# Patient Record
Sex: FEMALE | Race: WHITE | NOT HISPANIC OR LATINO | URBAN - METROPOLITAN AREA
[De-identification: names, ages, dates, MRNs, and addresses within clinical notes are randomized per-mention and may not be internally consistent; named-entity substitution may affect disease eponyms.]

---

## 2017-02-15 ENCOUNTER — TELEPHONE (OUTPATIENT)
Dept: PULMONOLOGY | Facility: HOSPICE | Age: 52
End: 2017-02-15

## 2017-02-15 DIAGNOSIS — G47.33 OSA (OBSTRUCTIVE SLEEP APNEA): ICD-10-CM

## 2017-03-03 ENCOUNTER — OFFICE VISIT (OUTPATIENT)
Dept: CARDIOLOGY | Facility: MEDICAL CENTER | Age: 52
End: 2017-03-03
Payer: COMMERCIAL

## 2017-03-03 VITALS
SYSTOLIC BLOOD PRESSURE: 136 MMHG | HEART RATE: 82 BPM | BODY MASS INDEX: 41.95 KG/M2 | DIASTOLIC BLOOD PRESSURE: 90 MMHG | HEIGHT: 70 IN | WEIGHT: 293 LBS | OXYGEN SATURATION: 96 %

## 2017-03-03 DIAGNOSIS — F33.41 RECURRENT MAJOR DEPRESSIVE DISORDER, IN PARTIAL REMISSION (HCC): ICD-10-CM

## 2017-03-03 DIAGNOSIS — R94.31 NONSPECIFIC ABNORMAL ELECTROCARDIOGRAM (ECG) (EKG): ICD-10-CM

## 2017-03-03 PROCEDURE — 99204 OFFICE O/P NEW MOD 45 MIN: CPT | Performed by: INTERNAL MEDICINE

## 2017-03-03 RX ORDER — ESCITALOPRAM OXALATE 20 MG/1
20 TABLET ORAL DAILY
Qty: 30 TAB | Refills: 11 | Status: SHIPPED | OUTPATIENT
Start: 2017-03-03 | End: 2018-03-29 | Stop reason: SDUPTHER

## 2017-03-03 ASSESSMENT — ENCOUNTER SYMPTOMS
STRIDOR: 0
DIZZINESS: 0
WHEEZING: 0
SPUTUM PRODUCTION: 0
GASTROINTESTINAL NEGATIVE: 1
MUSCULOSKELETAL NEGATIVE: 1
SHORTNESS OF BREATH: 0
BRUISES/BLEEDS EASILY: 0
WEAKNESS: 0
CONSTITUTIONAL NEGATIVE: 1
CHILLS: 0
LOSS OF CONSCIOUSNESS: 0
PALPITATIONS: 0
HEMOPTYSIS: 0
CLAUDICATION: 0
PND: 0
RESPIRATORY NEGATIVE: 1
FEVER: 0
COUGH: 0
ORTHOPNEA: 0
CARDIOVASCULAR NEGATIVE: 1
SORE THROAT: 0
EYES NEGATIVE: 1
NEUROLOGICAL NEGATIVE: 1

## 2017-03-03 NOTE — Clinical Note
[facility]   [address]  [place] [address]  Phone: [phone]  Fax: [phone]              Latia Almanza  1965    Encounter Date: 3/3/2017    Brandon Palencia M.D.          PROGRESS NOTE:  Subjective:   Latia Almanza is a 51 y.o. female who presents today as a new consultation for an abnormal ECG. Recently she underwent a back surgery and had an ECG afterwards that showed an incomplete right bundle branch block which was read as possible right ventricular hypertrophy. She was concerned about this and schedule appointment. She's had no changes to her functional activity last couple of years. Her EKG from one year ago shows the exact same pattern. She does have sleep apnea which is treated. She also admits that she is overweight with a BMI of 43. An echocardiogram one year ago was normal.    Past Medical History   Diagnosis Date   • Anxiety    • Sciatica    • Allergic rhinitis    • Obesity    • Restless leg syndrome    • Sarcoidosis (CMS-HCC)    • Snoring    • Pneumonia 5/2016   • Sleep apnea      CPAP   • Dental disorder      upper implants and bridge   • Bronchitis 5/2016   • Back pain      back/ right hip     Past Surgical History   Procedure Laterality Date   • Abdominal hysterectomy total     • Athroplasty     • Hip arthroplasty total     • Hip replacement, total     • Hysterectomy laparoscopy     • Laparotomy     • Sinuscope     • Other       dental implants   • Other orthopedic surgery       R hip replacement March 2008   • Lumbar fusion anterior  10/27/2016     Procedure: LUMBAR FUSION ANTERIOR L5-S1 ALIF;  Surgeon: Ronal Washington III, M.D.;  Location: SURGERY [facility];  Service:      Family History   Problem Relation Age of Onset   • Cancer Mother    • Lung Disease Mother    • Diabetes Mother    • Diabetes Father    • Heart Disease Father      History   Smoking status   • Former Smoker -- 0.25 packs/day for 5 years   •  "Types: Cigarettes   • Quit date: 01/20/2001   Smokeless tobacco   • Never Used     No Known Allergies  Outpatient Encounter Prescriptions as of 3/3/2017   Medication Sig Dispense Refill   • escitalopram (LEXAPRO) 20 MG tablet Take 1 Tab by mouth every day. 30 Tab 11   • estradiol (VIVELLE DOT) 0.05 MG/24HR PATCH BIWEEKLY APPLY ONE PATCH TO CLEAN, DRY SKIN AS DIRECTED TWO TIMES PER WEEK. REMOVE OLD PATCH BEFORE APPLYING NEW. 8 Patch 0   • methocarbamol (ROBAXIN) 750 MG Tab Take 1 Tab by mouth every 8 hours as needed. 120 Tab 3   • albuterol (VENTOLIN OR PROVENTIL) 108 (90 BASE) MCG/ACT Aero Soln inhalation aerosol Inhale 2 Puffs by mouth every 6 hours as needed for Shortness of Breath. 8.5 g 0   • Oxycodone-Acetaminophen (PERCOCET-10)  MG Tab Take 1-2 Tabs by mouth every four hours as needed for Moderate Pain or Severe Pain. 100 Tab 0   • promethazine (PHENERGAN) 12.5 MG tablet Take 1-2 Tabs by mouth every 6 hours as needed for Nausea/Vomiting (if no relief from ondansetron or if ondansetron is not ordered). 30 Tab 0     No facility-administered encounter medications on file as of 3/3/2017.     Review of Systems   Constitutional: Negative.  Negative for fever, chills and malaise/fatigue.   HENT: Negative.  Negative for sore throat.    Eyes: Negative.    Respiratory: Negative.  Negative for cough, hemoptysis, sputum production, shortness of breath, wheezing and stridor.    Cardiovascular: Negative.  Negative for chest pain, palpitations, orthopnea, claudication, leg swelling and PND.   Gastrointestinal: Negative.    Genitourinary: Negative.    Musculoskeletal: Negative.    Skin: Negative.    Neurological: Negative.  Negative for dizziness, loss of consciousness and weakness.   Endo/Heme/Allergies: Negative.  Does not bruise/bleed easily.   All other systems reviewed and are negative.       Objective:   /90 mmHg  Pulse 82  Ht 1.778 m (5' 10\")  Wt 136.533 kg (301 lb)  BMI 43.19 kg/m2  SpO2 " 96%    Physical Exam   Constitutional: She is oriented to person, place, and time. She appears well-developed and well-nourished. No distress.   HENT:   Head: Normocephalic.   Mouth/Throat: Oropharynx is clear and moist.   Eyes: EOM are normal. Pupils are equal, round, and reactive to light. Right eye exhibits no discharge. Left eye exhibits no discharge. No scleral icterus.   Neck: Normal range of motion. Neck supple. No JVD present. No tracheal deviation present.   Cardiovascular: Normal rate, regular rhythm, S1 normal, S2 normal, normal heart sounds, intact distal pulses and normal pulses.  Exam reveals no gallop, no S3, no S4 and no friction rub.    No murmur heard.   No systolic murmur is present    No diastolic murmur is present   Pulses:       Carotid pulses are 2+ on the right side, and 2+ on the left side.       Radial pulses are 2+ on the right side, and 2+ on the left side.        Dorsalis pedis pulses are 2+ on the right side, and 2+ on the left side.        Posterior tibial pulses are 2+ on the right side, and 2+ on the left side.   Pulmonary/Chest: Effort normal and breath sounds normal. No respiratory distress. She has no wheezes. She has no rales.   Abdominal: Soft. Bowel sounds are normal. She exhibits no distension and no mass. There is no tenderness. There is no rebound and no guarding.   Musculoskeletal: She exhibits no edema.   Neurological: She is alert and oriented to person, place, and time. No cranial nerve deficit.   Skin: Skin is warm and dry. She is not diaphoretic. No pallor.   Psychiatric: She has a normal mood and affect. Her behavior is normal. Judgment and thought content normal.   Nursing note and vitals reviewed.      Assessment:     1. Nonspecific abnormal electrocardiogram (ECG) (EKG)  EKG   2. Recurrent major depressive disorder, in partial remission (CMS-HCC)  escitalopram (LEXAPRO) 20 MG tablet       Medical Decision Making:  Today's Assessment / Status / Plan:     51-year-old  female with obesity and a rSR pattern in V1 consistent with mild delay of the right ventricle likely related to both sleep apnea and her weight. She is asymptomatic. I would not recommend any further cardiovascular testing. He can return to clinic as needed.      Armani Chambers M.D.  [address]  [address]  [place] [address]  VIA In Basket

## 2017-03-03 NOTE — MR AVS SNAPSHOT
"Latia Almanza   3/3/2017 1:45 PM   Office Visit   MRN: [id number]    Department:  [facility]   Dept Phone:  [phone]    Description:  Female : 1965   Provider:  Brandon Palencia M.D.           Reason for Visit     New Patient           Allergies as of 3/3/2017     No Known Allergies      You were diagnosed with     Nonspecific abnormal electrocardiogram (ECG) (EKG)   [794.31.ICD-9-CM]       Recurrent major depressive disorder, in partial remission (CMS-HCC)   [2310790]         Vital Signs     Blood Pressure Pulse Height Weight Body Mass Index Oxygen Saturation    136/90 mmHg 82 1.778 m (5' 10\") 136.533 kg (301 lb) 43.19 kg/m2 96%    Smoking Status                   Former Smoker           Basic Information     Date Of Birth Sex Race Ethnicity Preferred Language    1965 Female White Non- English      Your appointments     Mar 22, 2017  1:00 PM   Pulmonary Function Test with PFT-RM2   [facility] (--)    [address]  [place] [id number] [phone]            Mar 22, 2017  2:00 PM   Established Patient Pul with Lance Latif M.D.   [facility] (--)    [address]  [address]  [place] [id number] [phone]              Problem List              ICD-10-CM Priority Class Noted - Resolved    Sleep apnea G47.30   2015 - Present    H/O hysterectomy with oophorectomy Z90.710, Z90.721   2015 - Present    S/P hip replacement Z96.649   2015 - Present    Wheezing R06.2   2016 - Present    Postmenopausal HRT (hormone replacement therapy) Z79.890   2016 - Present    Degeneration of lumbar intervertebral disc M51.36   10/27/2016 - Present    Recurrent major depressive disorder, in partial remission (CMS-HCC) F33.41   3/3/2017 - Present      Health Maintenance        Date Due Completion Dates    IMM DTaP/Tdap/Td Vaccine (1 - Tdap) 1983 ---    MAMMOGRAM 5/15/2016 5/15/2015, 5/15/2015, " 5/15/2015, 5/15/2015, 5/15/2015, 5/15/2015, 5/15/2015, 5/6/2015    IMM INFLUENZA (1) 9/1/2016 ---    COLONOSCOPY 5/4/2025 5/4/2015            Results       Current Immunizations     No immunizations on file.      Below and/or attached are the medications your provider expects you to take. Review all of your home medications and newly ordered medications with your provider and/or pharmacist. Follow medication instructions as directed by your provider and/or pharmacist. Please keep your medication list with you and share with your provider. Update the information when medications are discontinued, doses are changed, or new medications (including over-the-counter products) are added; and carry medication information at all times in the event of emergency situations     Allergies:  No Known Allergies          Medications  Valid as of: March 03, 2017 -  4:09 PM    Generic Name Brand Name Tablet Size Instructions for use    Albuterol Sulfate (Aero Soln) albuterol 108 (90 BASE) MCG/ACT Inhale 2 Puffs by mouth every 6 hours as needed for Shortness of Breath.        Escitalopram Oxalate (Tab) LEXAPRO 20 MG Take 1 Tab by mouth every day.        Estradiol (PATCH BIWEEKLY) VIVELLE DOT 0.05 MG/24HR APPLY ONE PATCH TO CLEAN, DRY SKIN AS DIRECTED TWO TIMES PER WEEK. REMOVE OLD PATCH BEFORE APPLYING NEW.        Methocarbamol (Tab) ROBAXIN 750 MG Take 1 Tab by mouth every 8 hours as needed.        Oxycodone-Acetaminophen (Tab) PERCOCET-10  MG Take 1-2 Tabs by mouth every four hours as needed for Moderate Pain or Severe Pain.        Promethazine HCl (Tab) PHENERGAN 12.5 MG Take 1-2 Tabs by mouth every 6 hours as needed for Nausea/Vomiting (if no relief from ondansetron or if ondansetron is not ordered).        .                 Medicines prescribed today were sent to:     [id number] - [place] - [address]    [address] [place] [address]    Phone: [phone] Fax: [phone]    Open   Hours?: No      Medication refill instructions:       If your prescription bottle indicates you have medication refills left, it is not necessary to call your provider’s office. Please contact your pharmacy and they will refill your medication.    If your prescription bottle indicates you do not have any refills left, you may request refills at any time through one of the following ways: The online "[employer] system (except Urgent Care), by calling your provider’s office, or by asking your pharmacy to contact your provider’s office with a refill request. Medication refills are processed only during regular business hours and may not be available until the next business day. Your provider may request additional information or to have a follow-up visit with you prior to refilling your medication.   *Please Note: Medication refills are assigned a new Rx number when refilled electronically. Your pharmacy may indicate that no refills were authorized even though a new prescription for the same medication is available at the pharmacy. Please request the medicine by name with the pharmacy before contacting your provider for a refill.           "[employer] Access Code: Activation code not generated  Current [employer] Status: Active

## 2017-03-03 NOTE — PROGRESS NOTES
Subjective:   Latia Almanza is a 51 y.o. female who presents today as a new consultation for an abnormal ECG. Recently she underwent a back surgery and had an ECG afterwards that showed an incomplete right bundle branch block which was read as possible right ventricular hypertrophy. She was concerned about this and schedule appointment. She's had no changes to her functional activity last couple of years. Her EKG from one year ago shows the exact same pattern. She does have sleep apnea which is treated. She also admits that she is overweight with a BMI of 43. An echocardiogram one year ago was normal.    Past Medical History   Diagnosis Date   • Anxiety    • Sciatica    • Allergic rhinitis    • Obesity    • Restless leg syndrome    • Sarcoidosis (CMS-HCC)    • Snoring    • Pneumonia 5/2016   • Sleep apnea      CPAP   • Dental disorder      upper implants and bridge   • Bronchitis 5/2016   • Back pain      back/ right hip     Past Surgical History   Procedure Laterality Date   • Abdominal hysterectomy total     • Athroplasty     • Hip arthroplasty total     • Hip replacement, total     • Hysterectomy laparoscopy     • Laparotomy     • Sinuscope     • Other       dental implants   • Other orthopedic surgery       R hip replacement March 2008   • Lumbar fusion anterior  10/27/2016     Procedure: LUMBAR FUSION ANTERIOR L5-S1 ALIF;  Surgeon: Ronal Washington III, M.D.;  Location: SURGERY [facility];  Service:      Family History   Problem Relation Age of Onset   • Cancer Mother    • Lung Disease Mother    • Diabetes Mother    • Diabetes Father    • Heart Disease Father      History   Smoking status   • Former Smoker -- 0.25 packs/day for 5 years   • Types: Cigarettes   • Quit date: 01/20/2001   Smokeless tobacco   • Never Used     No Known Allergies  Outpatient Encounter Prescriptions as of 3/3/2017   Medication Sig Dispense Refill   • escitalopram (LEXAPRO) 20 MG tablet Take 1 Tab by mouth every day. 30 Tab 11   •  "estradiol (VIVELLE DOT) 0.05 MG/24HR PATCH BIWEEKLY APPLY ONE PATCH TO CLEAN, DRY SKIN AS DIRECTED TWO TIMES PER WEEK. REMOVE OLD PATCH BEFORE APPLYING NEW. 8 Patch 0   • methocarbamol (ROBAXIN) 750 MG Tab Take 1 Tab by mouth every 8 hours as needed. 120 Tab 3   • albuterol (VENTOLIN OR PROVENTIL) 108 (90 BASE) MCG/ACT Aero Soln inhalation aerosol Inhale 2 Puffs by mouth every 6 hours as needed for Shortness of Breath. 8.5 g 0   • Oxycodone-Acetaminophen (PERCOCET-10)  MG Tab Take 1-2 Tabs by mouth every four hours as needed for Moderate Pain or Severe Pain. 100 Tab 0   • promethazine (PHENERGAN) 12.5 MG tablet Take 1-2 Tabs by mouth every 6 hours as needed for Nausea/Vomiting (if no relief from ondansetron or if ondansetron is not ordered). 30 Tab 0     No facility-administered encounter medications on file as of 3/3/2017.     Review of Systems   Constitutional: Negative.  Negative for fever, chills and malaise/fatigue.   HENT: Negative.  Negative for sore throat.    Eyes: Negative.    Respiratory: Negative.  Negative for cough, hemoptysis, sputum production, shortness of breath, wheezing and stridor.    Cardiovascular: Negative.  Negative for chest pain, palpitations, orthopnea, claudication, leg swelling and PND.   Gastrointestinal: Negative.    Genitourinary: Negative.    Musculoskeletal: Negative.    Skin: Negative.    Neurological: Negative.  Negative for dizziness, loss of consciousness and weakness.   Endo/Heme/Allergies: Negative.  Does not bruise/bleed easily.   All other systems reviewed and are negative.       Objective:   /90 mmHg  Pulse 82  Ht 1.778 m (5' 10\")  Wt 136.533 kg (301 lb)  BMI 43.19 kg/m2  SpO2 96%    Physical Exam   Constitutional: She is oriented to person, place, and time. She appears well-developed and well-nourished. No distress.   HENT:   Head: Normocephalic.   Mouth/Throat: Oropharynx is clear and moist.   Eyes: EOM are normal. Pupils are equal, round, and reactive " to light. Right eye exhibits no discharge. Left eye exhibits no discharge. No scleral icterus.   Neck: Normal range of motion. Neck supple. No JVD present. No tracheal deviation present.   Cardiovascular: Normal rate, regular rhythm, S1 normal, S2 normal, normal heart sounds, intact distal pulses and normal pulses.  Exam reveals no gallop, no S3, no S4 and no friction rub.    No murmur heard.   No systolic murmur is present    No diastolic murmur is present   Pulses:       Carotid pulses are 2+ on the right side, and 2+ on the left side.       Radial pulses are 2+ on the right side, and 2+ on the left side.        Dorsalis pedis pulses are 2+ on the right side, and 2+ on the left side.        Posterior tibial pulses are 2+ on the right side, and 2+ on the left side.   Pulmonary/Chest: Effort normal and breath sounds normal. No respiratory distress. She has no wheezes. She has no rales.   Abdominal: Soft. Bowel sounds are normal. She exhibits no distension and no mass. There is no tenderness. There is no rebound and no guarding.   Musculoskeletal: She exhibits no edema.   Neurological: She is alert and oriented to person, place, and time. No cranial nerve deficit.   Skin: Skin is warm and dry. She is not diaphoretic. No pallor.   Psychiatric: She has a normal mood and affect. Her behavior is normal. Judgment and thought content normal.   Nursing note and vitals reviewed.      Assessment:     1. Nonspecific abnormal electrocardiogram (ECG) (EKG)  EKG   2. Recurrent major depressive disorder, in partial remission (CMS-HCC)  escitalopram (LEXAPRO) 20 MG tablet       Medical Decision Making:  Today's Assessment / Status / Plan:     51-year-old female with obesity and a rSR pattern in V1 consistent with mild delay of the right ventricle likely related to both sleep apnea and her weight. She is asymptomatic. I would not recommend any further cardiovascular testing. He can return to clinic as needed.

## 2017-03-06 LAB — EKG IMPRESSION: NORMAL

## 2017-03-14 ENCOUNTER — TELEPHONE (OUTPATIENT)
Dept: PULMONOLOGY | Facility: HOSPICE | Age: 52
End: 2017-03-14

## 2017-03-14 DIAGNOSIS — R06.02 SOB (SHORTNESS OF BREATH): ICD-10-CM

## 2017-05-26 ENCOUNTER — TELEPHONE (OUTPATIENT)
Dept: PULMONOLOGY | Facility: HOSPICE | Age: 52
End: 2017-05-26

## 2017-05-26 DIAGNOSIS — G47.33 OBSTRUCTIVE SLEEP APNEA: ICD-10-CM

## 2017-05-26 NOTE — TELEPHONE ENCOUNTER
Pt is wanting to switch DME companies   Please sign updated order for replacement CPAP machine    Pt will be making her 1 yr appt

## 2017-09-22 ENCOUNTER — HOSPITAL ENCOUNTER (OUTPATIENT)
Dept: RADIOLOGY | Facility: MEDICAL CENTER | Age: 52
End: 2017-09-22
Attending: SURGERY
Payer: COMMERCIAL

## 2017-09-22 DIAGNOSIS — R19.00 ABDOMINAL OR PELVIC SWELLING, MASS OR LUMP, UNSPECIFIED SITE: ICD-10-CM

## 2017-09-22 PROCEDURE — 74177 CT ABD & PELVIS W/CONTRAST: CPT

## 2017-09-22 PROCEDURE — 700117 HCHG RX CONTRAST REV CODE 255: Performed by: SURGERY

## 2017-09-22 RX ADMIN — IOHEXOL 100 ML: 350 INJECTION, SOLUTION INTRAVENOUS at 09:10

## 2017-09-22 RX ADMIN — IOHEXOL 50 ML: 240 INJECTION, SOLUTION INTRATHECAL; INTRAVASCULAR; INTRAVENOUS; ORAL at 09:09

## 2017-10-17 ENCOUNTER — TELEPHONE (OUTPATIENT)
Dept: PULMONOLOGY | Facility: HOSPICE | Age: 52
End: 2017-10-17

## 2017-10-18 NOTE — TELEPHONE ENCOUNTER
Patient calling wants order for chest x-ray for cough. Last seen with Dr Dye 5/27/16. She has only been seen once and will need to be seen with physician. There are now apponitments available this week or next. Patient advised to seek urgent care for chest x-ray. Patient states she will check with her other  Physicians to see what else may be available to her. I did consult with Lala GARCIA and she states urgent care for evaluation. Patient understands and will call back for appt if wished to pursue appt with our office.

## 2017-11-17 ENCOUNTER — OFFICE VISIT (OUTPATIENT)
Dept: CARDIOLOGY | Facility: MEDICAL CENTER | Age: 52
End: 2017-11-17
Payer: COMMERCIAL

## 2017-11-17 VITALS
DIASTOLIC BLOOD PRESSURE: 70 MMHG | SYSTOLIC BLOOD PRESSURE: 130 MMHG | HEART RATE: 80 BPM | WEIGHT: 293 LBS | BODY MASS INDEX: 41.95 KG/M2 | HEIGHT: 70 IN

## 2017-11-17 DIAGNOSIS — Z01.810 PRE-OPERATIVE CARDIOVASCULAR EXAMINATION: ICD-10-CM

## 2017-11-17 PROCEDURE — 99214 OFFICE O/P EST MOD 30 MIN: CPT | Performed by: INTERNAL MEDICINE

## 2017-11-17 ASSESSMENT — ENCOUNTER SYMPTOMS
PND: 0
LOSS OF CONSCIOUSNESS: 0
STRIDOR: 0
EYES NEGATIVE: 1
MUSCULOSKELETAL NEGATIVE: 1
CARDIOVASCULAR NEGATIVE: 1
RESPIRATORY NEGATIVE: 1
ORTHOPNEA: 0
NEUROLOGICAL NEGATIVE: 1
PALPITATIONS: 0
SORE THROAT: 0
CHILLS: 0
BRUISES/BLEEDS EASILY: 0
HEMOPTYSIS: 0
GASTROINTESTINAL NEGATIVE: 1
CONSTITUTIONAL NEGATIVE: 1
FEVER: 0
DIZZINESS: 0
SHORTNESS OF BREATH: 0
WHEEZING: 0
COUGH: 0
CLAUDICATION: 0
SPUTUM PRODUCTION: 0
WEAKNESS: 0

## 2017-11-18 NOTE — PROGRESS NOTES
Subjective:   Latia Almanza is a 52 y.o. female who presents today has a preoperative stratification prior to be a potential bariatric surgery. She has a past medical history of palpitations as well as a right bundle branch block. She gets short of breath with exertion but no chest pain. She is also concerned that when she walks she gets  Red in the face and winded but no chest pain. She thinks she has a premature family history but in reviewing this nobody prior to the age of 55 for males had heart disease and a phenol spread ages 65 had heart disease. Her other risk factors are controlled.    Past Medical History:   Diagnosis Date   • Allergic rhinitis    • Anxiety    • Back pain     back/ right hip   • Bronchitis 5/2016   • Dental disorder     upper implants and bridge   • Obesity    • Pneumonia 5/2016   • Restless leg syndrome    • Sarcoidosis (CMS-HCC)    • Sciatica    • Sleep apnea     CPAP   • Snoring      Past Surgical History:   Procedure Laterality Date   • LUMBAR FUSION ANTERIOR  10/27/2016    Procedure: LUMBAR FUSION ANTERIOR L5-S1 ALIF;  Surgeon: Ronal Washington III, M.D.;  Location: SURGERY [facility];  Service:    • ABDOMINAL HYSTERECTOMY TOTAL     • ATHROPLASTY     • HIP ARTHROPLASTY TOTAL     • HIP REPLACEMENT, TOTAL     • HYSTERECTOMY LAPAROSCOPY     • LAPAROTOMY     • OTHER      dental implants   • OTHER ORTHOPEDIC SURGERY      R hip replacement March 2008   • SINUSCOPE       Family History   Problem Relation Age of Onset   • Cancer Mother    • Lung Disease Mother    • Diabetes Mother    • Diabetes Father    • Heart Disease Father      History   Smoking Status   • Former Smoker   • Packs/day: 0.25   • Years: 5.00   • Types: Cigarettes   • Quit date: 1/20/2001   Smokeless Tobacco   • Never Used     No Known Allergies  Outpatient Encounter Prescriptions as of 11/17/2017   Medication Sig Dispense Refill   • escitalopram (LEXAPRO) 20 MG tablet Take 1 Tab by mouth every day. 30 Tab 11   •  "estradiol (VIVELLE DOT) 0.05 MG/24HR PATCH BIWEEKLY APPLY ONE PATCH TO CLEAN, DRY SKIN AS DIRECTED TWO TIMES PER WEEK. REMOVE OLD PATCH BEFORE APPLYING NEW. 8 Patch 0   • albuterol (VENTOLIN OR PROVENTIL) 108 (90 BASE) MCG/ACT Aero Soln inhalation aerosol Inhale 2 Puffs by mouth every 6 hours as needed for Shortness of Breath. 8.5 g 0   • [DISCONTINUED] Oxycodone-Acetaminophen (PERCOCET-10)  MG Tab Take 1-2 Tabs by mouth every four hours as needed for Moderate Pain or Severe Pain. 100 Tab 0   • [DISCONTINUED] promethazine (PHENERGAN) 12.5 MG tablet Take 1-2 Tabs by mouth every 6 hours as needed for Nausea/Vomiting (if no relief from ondansetron or if ondansetron is not ordered). 30 Tab 0   • [DISCONTINUED] methocarbamol (ROBAXIN) 750 MG Tab Take 1 Tab by mouth every 8 hours as needed. 120 Tab 3     No facility-administered encounter medications on file as of 11/17/2017.      Review of Systems   Constitutional: Negative.  Negative for chills, fever and malaise/fatigue.   HENT: Negative.  Negative for sore throat.    Eyes: Negative.    Respiratory: Negative.  Negative for cough, hemoptysis, sputum production, shortness of breath, wheezing and stridor.    Cardiovascular: Negative.  Negative for chest pain, palpitations, orthopnea, claudication, leg swelling and PND.   Gastrointestinal: Negative.    Genitourinary: Negative.    Musculoskeletal: Negative.    Skin: Negative.    Neurological: Negative.  Negative for dizziness, loss of consciousness and weakness.   Endo/Heme/Allergies: Negative.  Does not bruise/bleed easily.   All other systems reviewed and are negative.       Objective:   /70   Pulse 80   Ht 1.778 m (5' 10\")   Wt (!) 147.9 kg (326 lb)   BMI 46.78 kg/m²     Physical Exam   Constitutional: She is oriented to person, place, and time. She appears well-developed and well-nourished. No distress.   HENT:   Head: Normocephalic.   Mouth/Throat: Oropharynx is clear and moist.   Eyes: EOM are normal. " Pupils are equal, round, and reactive to light. Right eye exhibits no discharge. Left eye exhibits no discharge. No scleral icterus.   Neck: Normal range of motion. Neck supple. No JVD present. No tracheal deviation present.   Cardiovascular: Normal rate, regular rhythm, S1 normal, S2 normal, normal heart sounds, intact distal pulses and normal pulses.  Exam reveals no gallop, no S3, no S4 and no friction rub.    No murmur heard.   No systolic murmur is present    No diastolic murmur is present   Pulses:       Carotid pulses are 2+ on the right side, and 2+ on the left side.       Radial pulses are 2+ on the right side, and 2+ on the left side.        Dorsalis pedis pulses are 2+ on the right side, and 2+ on the left side.        Posterior tibial pulses are 2+ on the right side, and 2+ on the left side.   Pulmonary/Chest: Effort normal and breath sounds normal. No respiratory distress. She has no wheezes. She has no rales.   Abdominal: Soft. Bowel sounds are normal. She exhibits no distension and no mass. There is no tenderness. There is no rebound and no guarding.   Musculoskeletal: She exhibits no edema.   Neurological: She is alert and oriented to person, place, and time. No cranial nerve deficit.   Skin: Skin is warm and dry. She is not diaphoretic. No pallor.   Psychiatric: She has a normal mood and affect. Her behavior is normal. Judgment and thought content normal.   Nursing note and vitals reviewed.      Assessment:     1. Pre-operative cardiovascular examination  ECHOCARDIOGRAM COMP W/O CONT    NM-CARDIAC STRESS TEST       Medical Decision Making:  Today's Assessment / Status / Plan:     52-year-old female with morbid obesity a right bundle branch block here for preoperative stratification. We will go ahead and get a stress test as well as an echocardiogram to look at both whether or not she has any ischemia on her nuclear study as well as to look at the morphology of her right ventricle given the right  bundle branch block her likely sleep apnea and morbid obesity. We will call her with results of this.  Otherwise she does not require any follow-up and cardiogenic clinic.    Thank for you allowing me to take part in your patient's care, please call should you have any questions or would like to discuss this patient.

## 2017-11-21 ENCOUNTER — APPOINTMENT (OUTPATIENT)
Dept: HEALTH INFORMATION MANAGEMENT | Facility: MEDICAL CENTER | Age: 52
End: 2017-11-21
Payer: COMMERCIAL

## 2017-11-22 ENCOUNTER — OFFICE VISIT (OUTPATIENT)
Dept: URGENT CARE | Facility: CLINIC | Age: 52
End: 2017-11-22
Payer: COMMERCIAL

## 2017-11-22 ENCOUNTER — APPOINTMENT (OUTPATIENT)
Dept: RADIOLOGY | Facility: IMAGING CENTER | Age: 52
End: 2017-11-22
Attending: PHYSICIAN ASSISTANT
Payer: COMMERCIAL

## 2017-11-22 VITALS
WEIGHT: 293 LBS | DIASTOLIC BLOOD PRESSURE: 82 MMHG | HEIGHT: 70 IN | TEMPERATURE: 96.9 F | BODY MASS INDEX: 41.95 KG/M2 | SYSTOLIC BLOOD PRESSURE: 148 MMHG | HEART RATE: 80 BPM | OXYGEN SATURATION: 94 % | RESPIRATION RATE: 24 BRPM

## 2017-11-22 DIAGNOSIS — R06.2 WHEEZING: ICD-10-CM

## 2017-11-22 DIAGNOSIS — R05.9 COUGH: ICD-10-CM

## 2017-11-22 DIAGNOSIS — J06.9 VIRAL URI: Primary | ICD-10-CM

## 2017-11-22 PROCEDURE — 99214 OFFICE O/P EST MOD 30 MIN: CPT | Mod: 25 | Performed by: PHYSICIAN ASSISTANT

## 2017-11-22 PROCEDURE — 94640 AIRWAY INHALATION TREATMENT: CPT | Performed by: PHYSICIAN ASSISTANT

## 2017-11-22 PROCEDURE — 71020 DX-CHEST-2 VIEWS: CPT | Mod: TC | Performed by: PHYSICIAN ASSISTANT

## 2017-11-22 RX ORDER — IPRATROPIUM BROMIDE AND ALBUTEROL SULFATE 2.5; .5 MG/3ML; MG/3ML
3 SOLUTION RESPIRATORY (INHALATION) ONCE
Status: COMPLETED | OUTPATIENT
Start: 2017-11-22 | End: 2017-11-22

## 2017-11-22 RX ORDER — PREDNISONE 20 MG/1
TABLET ORAL
Qty: 10 TAB | Refills: 0 | Status: ON HOLD | OUTPATIENT
Start: 2017-11-22 | End: 2017-11-25

## 2017-11-22 RX ORDER — CODEINE PHOSPHATE AND GUAIFENESIN 10; 100 MG/5ML; MG/5ML
5 SOLUTION ORAL EVERY 6 HOURS PRN
Qty: 120 ML | Refills: 0 | Status: SHIPPED | OUTPATIENT
Start: 2017-11-22 | End: 2018-03-26

## 2017-11-22 RX ORDER — ALBUTEROL SULFATE 90 UG/1
2 AEROSOL, METERED RESPIRATORY (INHALATION) EVERY 6 HOURS PRN
Qty: 8.5 G | Refills: 0 | Status: SHIPPED | OUTPATIENT
Start: 2017-11-22 | End: 2018-12-10

## 2017-11-22 RX ADMIN — IPRATROPIUM BROMIDE AND ALBUTEROL SULFATE 3 ML: 2.5; .5 SOLUTION RESPIRATORY (INHALATION) at 13:59

## 2017-11-22 ASSESSMENT — ENCOUNTER SYMPTOMS
NAUSEA: 0
TINGLING: 0
SENSORY CHANGE: 0
FOCAL WEAKNESS: 0
MYALGIAS: 0
WHEEZING: 1
CHILLS: 0
SWEATS: 0
ABDOMINAL PAIN: 0
DIARRHEA: 0
SORE THROAT: 0
SPUTUM PRODUCTION: 0
HEADACHES: 0
FEVER: 1
SHORTNESS OF BREATH: 1
HEMOPTYSIS: 0
VOMITING: 0
DIZZINESS: 0
RHINORRHEA: 0
COUGH: 1
PALPITATIONS: 0

## 2017-11-22 ASSESSMENT — COPD QUESTIONNAIRES: COPD: 0

## 2017-11-22 NOTE — PROGRESS NOTES
Subjective:      Latia Almanza is a 52 y.o. female who presents with Cough (cough, short of breath, dizzy, uses inhaler, body aches started yesterday)            Cough   This is a new problem. Episode onset: 3-4 days. The problem has been gradually worsening. The cough is non-productive. Associated symptoms include a fever (subjective), shortness of breath and wheezing. Pertinent negatives include no chest pain, chills, ear congestion, ear pain, headaches, hemoptysis, myalgias, nasal congestion, postnasal drip, rash, rhinorrhea, sore throat or sweats. Exacerbated by: activity  She has tried a beta-agonist inhaler for the symptoms. The treatment provided mild relief. Her past medical history is significant for bronchitis and pneumonia. There is no history of asthma, COPD or environmental allergies.       Past Medical History:   Diagnosis Date   • Allergic rhinitis    • Anxiety    • Back pain     back/ right hip   • Bronchitis 5/2016   • Dental disorder     upper implants and bridge   • Obesity    • Pneumonia 5/2016   • Restless leg syndrome    • Sarcoidosis (CMS-HCC)    • Sciatica    • Sleep apnea     CPAP   • Snoring      Past Surgical History:   Procedure Laterality Date   • LUMBAR FUSION ANTERIOR  10/27/2016    Procedure: LUMBAR FUSION ANTERIOR L5-S1 ALIF;  Surgeon: Ronal Washington III, M.D.;  Location: SURGERY [facility];  Service:    • ABDOMINAL HYSTERECTOMY TOTAL     • ATHROPLASTY     • HIP ARTHROPLASTY TOTAL     • HIP REPLACEMENT, TOTAL     • HYSTERECTOMY LAPAROSCOPY     • LAPAROTOMY     • OTHER      dental implants   • OTHER ORTHOPEDIC SURGERY      R hip replacement March 2008   • SINUSCOPE         Family History   Problem Relation Age of Onset   • Cancer Mother    • Lung Disease Mother    • Diabetes Mother    • Diabetes Father    • Heart Disease Father        No Known Allergies    Medications, Allergies, and current problem list reviewed today in Epic      Review of Systems   Constitutional: Positive  "for fever (subjective). Negative for chills and malaise/fatigue.   HENT: Positive for congestion. Negative for ear discharge, ear pain, postnasal drip, rhinorrhea and sore throat.    Respiratory: Positive for cough, shortness of breath and wheezing. Negative for hemoptysis and sputum production.    Cardiovascular: Negative for chest pain, palpitations and leg swelling.   Gastrointestinal: Negative for abdominal pain, diarrhea, nausea and vomiting.   Musculoskeletal: Negative for myalgias.   Skin: Negative for rash.   Neurological: Negative for dizziness, tingling, sensory change, focal weakness and headaches.   Endo/Heme/Allergies: Negative for environmental allergies.     All other systems reviewed and are negative.        Objective:     /82   Pulse 80   Temp 36.1 °C (96.9 °F)   Resp (!) 24   Ht 1.778 m (5' 10\")   Wt (!) 144.2 kg (318 lb)   SpO2 94%   BMI 45.63 kg/m²      Physical Exam   Constitutional: She is oriented to person, place, and time. She appears well-developed and well-nourished. No distress.   HENT:   Head: Normocephalic and atraumatic.   Right Ear: Tympanic membrane, external ear and ear canal normal.   Left Ear: Tympanic membrane, external ear and ear canal normal.   Nose: Nose normal.   Mouth/Throat: Uvula is midline, oropharynx is clear and moist and mucous membranes are normal.   Eyes: Conjunctivae are normal.   Neck: Neck supple.   Cardiovascular: Normal rate, regular rhythm and normal heart sounds.  Exam reveals no gallop and no friction rub.    No murmur heard.  Pulmonary/Chest: Effort normal. No respiratory distress. She has no decreased breath sounds. She has wheezes (diffuse wheezes throughout all lung fields ). She has no rhonchi. She has no rales. She exhibits no tenderness.   Lymphadenopathy:     She has no cervical adenopathy.   Neurological: She is alert and oriented to person, place, and time. No cranial nerve deficit.   Psychiatric: She has a normal mood and affect. Her " behavior is normal. Judgment and thought content normal.       11/22/2017 1:57 PM    HISTORY/REASON FOR EXAM:  Cough.  Shortness of breath.    TECHNIQUE/EXAM DESCRIPTION AND NUMBER OF VIEWS:  Two views of the chest.    COMPARISON:  10/25/2016    FINDINGS:  The heart is normal in size.  No pulmonary infiltrates or consolidations are noted.  No pleural effusions are appreciated.     Impression       No pulmonary consolidation.               Assessment/Plan:     1. Viral URI    2. Wheezing   chest x -ray negative. No signs of pneumonia  - DX-CHEST-2 VIEWS; Future  - ipratropium-albuterol (DUONEB) nebulizer solution 3 mL; 3 mL by Nebulization route Once.  - predniSONE (DELTASONE) 20 MG Tab; 2 tabs by mouth daily x 5 days  Dispense: 10 Tab; Refill: 0  - guaifenesin-codeine (CHERATUSSIN AC) Solution oral solution; Take 5 mL by mouth every 6 hours as needed.  Dispense: 120 mL; Refill: 0  Sedation side effects discussed. No Driving or alcohol with medication given.    - albuterol 108 (90 Base) MCG/ACT Aero Soln inhalation aerosol; Inhale 2 Puffs by mouth every 6 hours as needed for Shortness of Breath.  Dispense: 8.5 g; Refill: 0    - encouraged fluids, rest, humidification.     Differential diagnoses, Supportive care, and indications for immediate follow-up discussed with patient.   Instructed to return to clinic or nearest emergency department for any change in condition, further concerns, or worsening of symptoms.    The patient demonstrated a good understanding and agreed with the treatment plan.    Lori Patel P.A.-C.

## 2017-11-23 ENCOUNTER — HOSPITAL ENCOUNTER (INPATIENT)
Facility: MEDICAL CENTER | Age: 52
LOS: 1 days | DRG: 189 | End: 2017-11-25
Attending: EMERGENCY MEDICINE | Admitting: FAMILY MEDICINE
Payer: COMMERCIAL

## 2017-11-23 ENCOUNTER — APPOINTMENT (OUTPATIENT)
Dept: RADIOLOGY | Facility: MEDICAL CENTER | Age: 52
DRG: 189 | End: 2017-11-23
Attending: EMERGENCY MEDICINE
Payer: COMMERCIAL

## 2017-11-23 DIAGNOSIS — R09.02 HYPOXIA: ICD-10-CM

## 2017-11-23 DIAGNOSIS — J18.0 BRONCHOPNEUMONIA: ICD-10-CM

## 2017-11-23 LAB
ALBUMIN SERPL BCP-MCNC: 4.4 G/DL (ref 3.2–4.9)
ALBUMIN/GLOB SERPL: 1.2 G/DL
ALP SERPL-CCNC: 90 U/L (ref 30–99)
ALT SERPL-CCNC: 34 U/L (ref 2–50)
ANION GAP SERPL CALC-SCNC: 17 MMOL/L (ref 0–11.9)
AST SERPL-CCNC: 31 U/L (ref 12–45)
BASOPHILS # BLD AUTO: 0.1 % (ref 0–1.8)
BASOPHILS # BLD: 0.01 K/UL (ref 0–0.12)
BILIRUB SERPL-MCNC: 0.4 MG/DL (ref 0.1–1.5)
BUN SERPL-MCNC: 17 MG/DL (ref 8–22)
CALCIUM SERPL-MCNC: 9 MG/DL (ref 8.4–10.2)
CHLORIDE SERPL-SCNC: 103 MMOL/L (ref 96–112)
CO2 SERPL-SCNC: 18 MMOL/L (ref 20–33)
CREAT SERPL-MCNC: 1.2 MG/DL (ref 0.5–1.4)
EKG IMPRESSION: NORMAL
EOSINOPHIL # BLD AUTO: 0 K/UL (ref 0–0.51)
EOSINOPHIL NFR BLD: 0 % (ref 0–6.9)
ERYTHROCYTE [DISTWIDTH] IN BLOOD BY AUTOMATED COUNT: 44.8 FL (ref 35.9–50)
FLUAV+FLUBV AG SPEC QL IA: NORMAL
GFR SERPL CREATININE-BSD FRML MDRD: 47 ML/MIN/1.73 M 2
GLOBULIN SER CALC-MCNC: 3.7 G/DL (ref 1.9–3.5)
GLUCOSE SERPL-MCNC: 180 MG/DL (ref 65–99)
HCT VFR BLD AUTO: 43.7 % (ref 37–47)
HGB BLD-MCNC: 14.4 G/DL (ref 12–16)
IMM GRANULOCYTES # BLD AUTO: 0.01 K/UL (ref 0–0.11)
IMM GRANULOCYTES NFR BLD AUTO: 0.1 % (ref 0–0.9)
LYMPHOCYTES # BLD AUTO: 0.8 K/UL (ref 1–4.8)
LYMPHOCYTES NFR BLD: 11.2 % (ref 22–41)
MCH RBC QN AUTO: 29.5 PG (ref 27–33)
MCHC RBC AUTO-ENTMCNC: 33 G/DL (ref 33.6–35)
MCV RBC AUTO: 89.5 FL (ref 81.4–97.8)
MONOCYTES # BLD AUTO: 0.28 K/UL (ref 0–0.85)
MONOCYTES NFR BLD AUTO: 3.9 % (ref 0–13.4)
NEUTROPHILS # BLD AUTO: 6.06 K/UL (ref 2–7.15)
NEUTROPHILS NFR BLD: 84.7 % (ref 44–72)
NRBC # BLD AUTO: 0 K/UL
NRBC BLD AUTO-RTO: 0 /100 WBC
PLATELET # BLD AUTO: 374 K/UL (ref 164–446)
PMV BLD AUTO: 9.5 FL (ref 9–12.9)
POTASSIUM SERPL-SCNC: 4.2 MMOL/L (ref 3.6–5.5)
PROT SERPL-MCNC: 8.1 G/DL (ref 6–8.2)
RBC # BLD AUTO: 4.88 M/UL (ref 4.2–5.4)
SIGNIFICANT IND 70042: NORMAL
SITE SITE: NORMAL
SODIUM SERPL-SCNC: 138 MMOL/L (ref 135–145)
SOURCE SOURCE: NORMAL
TROPONIN I SERPL-MCNC: <0.02 NG/ML (ref 0–0.04)
WBC # BLD AUTO: 7.2 K/UL (ref 4.8–10.8)

## 2017-11-23 PROCEDURE — 87400 INFLUENZA A/B EACH AG IA: CPT

## 2017-11-23 PROCEDURE — 36415 COLL VENOUS BLD VENIPUNCTURE: CPT

## 2017-11-23 PROCEDURE — 700101 HCHG RX REV CODE 250: Performed by: EMERGENCY MEDICINE

## 2017-11-23 PROCEDURE — 93005 ELECTROCARDIOGRAM TRACING: CPT | Performed by: EMERGENCY MEDICINE

## 2017-11-23 PROCEDURE — 700105 HCHG RX REV CODE 258: Performed by: EMERGENCY MEDICINE

## 2017-11-23 PROCEDURE — 71275 CT ANGIOGRAPHY CHEST: CPT

## 2017-11-23 PROCEDURE — 80053 COMPREHEN METABOLIC PANEL: CPT

## 2017-11-23 PROCEDURE — 94640 AIRWAY INHALATION TREATMENT: CPT

## 2017-11-23 PROCEDURE — 87502 INFLUENZA DNA AMP PROBE: CPT

## 2017-11-23 PROCEDURE — 71020 DX-CHEST-2 VIEWS: CPT

## 2017-11-23 PROCEDURE — 85025 COMPLETE CBC W/AUTO DIFF WBC: CPT

## 2017-11-23 PROCEDURE — 84484 ASSAY OF TROPONIN QUANT: CPT

## 2017-11-23 PROCEDURE — 700117 HCHG RX CONTRAST REV CODE 255: Performed by: EMERGENCY MEDICINE

## 2017-11-23 PROCEDURE — 99285 EMERGENCY DEPT VISIT HI MDM: CPT

## 2017-11-23 RX ORDER — CEFTRIAXONE 2 G/1
2 INJECTION, POWDER, FOR SOLUTION INTRAMUSCULAR; INTRAVENOUS ONCE
Status: COMPLETED | OUTPATIENT
Start: 2017-11-24 | End: 2017-11-24

## 2017-11-23 RX ORDER — SODIUM CHLORIDE 9 MG/ML
1000 INJECTION, SOLUTION INTRAVENOUS ONCE
Status: COMPLETED | OUTPATIENT
Start: 2017-11-23 | End: 2017-11-24

## 2017-11-23 RX ADMIN — IPRATROPIUM BROMIDE 0.5 MG: 0.5 SOLUTION RESPIRATORY (INHALATION) at 21:09

## 2017-11-23 RX ADMIN — ALBUTEROL SULFATE 2.5 MG: 2.5 SOLUTION RESPIRATORY (INHALATION) at 21:10

## 2017-11-23 RX ADMIN — IOHEXOL 100 ML: 350 INJECTION, SOLUTION INTRAVENOUS at 23:23

## 2017-11-23 RX ADMIN — SODIUM CHLORIDE 1000 ML: 9 INJECTION, SOLUTION INTRAVENOUS at 23:38

## 2017-11-23 ASSESSMENT — PAIN SCALES - GENERAL: PAINLEVEL_OUTOF10: 0

## 2017-11-24 ENCOUNTER — RESOLUTE PROFESSIONAL BILLING HOSPITAL PROF FEE (OUTPATIENT)
Dept: HOSPITALIST | Facility: MEDICAL CENTER | Age: 52
End: 2017-11-24
Payer: COMMERCIAL

## 2017-11-24 PROBLEM — J18.9 PNEUMONIA: Status: ACTIVE | Noted: 2017-11-24

## 2017-11-24 PROBLEM — J45.909 ASTHMA: Status: ACTIVE | Noted: 2017-11-24

## 2017-11-24 PROBLEM — J96.01 ACUTE RESPIRATORY FAILURE WITH HYPOXIA (HCC): Status: ACTIVE | Noted: 2017-11-24

## 2017-11-24 PROBLEM — J18.0 BRONCHOPNEUMONIA, UNSPECIFIED ORGANISM: Status: ACTIVE | Noted: 2017-11-24

## 2017-11-24 PROBLEM — E11.9 DM (DIABETES MELLITUS) (HCC): Status: ACTIVE | Noted: 2017-11-24

## 2017-11-24 LAB
EST. AVERAGE GLUCOSE BLD GHB EST-MCNC: 134 MG/DL
FLUAV RNA SPEC QL NAA+PROBE: NEGATIVE
FLUBV RNA SPEC QL NAA+PROBE: NEGATIVE
GLUCOSE BLD-MCNC: 136 MG/DL (ref 65–99)
GLUCOSE BLD-MCNC: 170 MG/DL (ref 65–99)
GLUCOSE BLD-MCNC: 211 MG/DL (ref 65–99)
HBA1C MFR BLD: 6.3 % (ref 0–5.6)

## 2017-11-24 PROCEDURE — 700105 HCHG RX REV CODE 258: Performed by: FAMILY MEDICINE

## 2017-11-24 PROCEDURE — 700101 HCHG RX REV CODE 250: Performed by: INTERNAL MEDICINE

## 2017-11-24 PROCEDURE — 96374 THER/PROPH/DIAG INJ IV PUSH: CPT

## 2017-11-24 PROCEDURE — 700111 HCHG RX REV CODE 636 W/ 250 OVERRIDE (IP): Performed by: FAMILY MEDICINE

## 2017-11-24 PROCEDURE — 36415 COLL VENOUS BLD VENIPUNCTURE: CPT

## 2017-11-24 PROCEDURE — 94760 N-INVAS EAR/PLS OXIMETRY 1: CPT

## 2017-11-24 PROCEDURE — 94660 CPAP INITIATION&MGMT: CPT

## 2017-11-24 PROCEDURE — 96365 THER/PROPH/DIAG IV INF INIT: CPT

## 2017-11-24 PROCEDURE — A9270 NON-COVERED ITEM OR SERVICE: HCPCS | Performed by: FAMILY MEDICINE

## 2017-11-24 PROCEDURE — 87070 CULTURE OTHR SPECIMN AEROBIC: CPT

## 2017-11-24 PROCEDURE — 94640 AIRWAY INHALATION TREATMENT: CPT

## 2017-11-24 PROCEDURE — 96368 THER/DIAG CONCURRENT INF: CPT

## 2017-11-24 PROCEDURE — 700102 HCHG RX REV CODE 250 W/ 637 OVERRIDE(OP): Performed by: INTERNAL MEDICINE

## 2017-11-24 PROCEDURE — 700111 HCHG RX REV CODE 636 W/ 250 OVERRIDE (IP): Performed by: EMERGENCY MEDICINE

## 2017-11-24 PROCEDURE — 700111 HCHG RX REV CODE 636 W/ 250 OVERRIDE (IP): Performed by: INTERNAL MEDICINE

## 2017-11-24 PROCEDURE — 700105 HCHG RX REV CODE 258: Performed by: EMERGENCY MEDICINE

## 2017-11-24 PROCEDURE — 83036 HEMOGLOBIN GLYCOSYLATED A1C: CPT

## 2017-11-24 PROCEDURE — A9270 NON-COVERED ITEM OR SERVICE: HCPCS | Performed by: INTERNAL MEDICINE

## 2017-11-24 PROCEDURE — 700102 HCHG RX REV CODE 250 W/ 637 OVERRIDE(OP): Performed by: FAMILY MEDICINE

## 2017-11-24 PROCEDURE — 99222 1ST HOSP IP/OBS MODERATE 55: CPT | Performed by: FAMILY MEDICINE

## 2017-11-24 PROCEDURE — 87205 SMEAR GRAM STAIN: CPT

## 2017-11-24 PROCEDURE — 770006 HCHG ROOM/CARE - MED/SURG/GYN SEMI*

## 2017-11-24 PROCEDURE — 82962 GLUCOSE BLOOD TEST: CPT

## 2017-11-24 RX ORDER — METHYLPREDNISOLONE SODIUM SUCCINATE 40 MG/ML
40 INJECTION, POWDER, LYOPHILIZED, FOR SOLUTION INTRAMUSCULAR; INTRAVENOUS EVERY 8 HOURS
Status: DISCONTINUED | OUTPATIENT
Start: 2017-11-24 | End: 2017-11-24

## 2017-11-24 RX ORDER — ACETAMINOPHEN 325 MG/1
650 TABLET ORAL EVERY 6 HOURS PRN
Status: DISCONTINUED | OUTPATIENT
Start: 2017-11-24 | End: 2017-11-25 | Stop reason: HOSPADM

## 2017-11-24 RX ORDER — IPRATROPIUM BROMIDE AND ALBUTEROL SULFATE 2.5; .5 MG/3ML; MG/3ML
3 SOLUTION RESPIRATORY (INHALATION)
Status: DISCONTINUED | OUTPATIENT
Start: 2017-11-24 | End: 2017-11-25 | Stop reason: HOSPADM

## 2017-11-24 RX ORDER — BISACODYL 10 MG
10 SUPPOSITORY, RECTAL RECTAL
Status: DISCONTINUED | OUTPATIENT
Start: 2017-11-24 | End: 2017-11-25 | Stop reason: HOSPADM

## 2017-11-24 RX ORDER — ESCITALOPRAM OXALATE 10 MG/1
20 TABLET ORAL DAILY
Status: DISCONTINUED | OUTPATIENT
Start: 2017-11-24 | End: 2017-11-25 | Stop reason: HOSPADM

## 2017-11-24 RX ORDER — METHYLPREDNISOLONE SODIUM SUCCINATE 40 MG/ML
80 INJECTION, POWDER, LYOPHILIZED, FOR SOLUTION INTRAMUSCULAR; INTRAVENOUS EVERY 8 HOURS
Status: DISCONTINUED | OUTPATIENT
Start: 2017-11-24 | End: 2017-11-25 | Stop reason: HOSPADM

## 2017-11-24 RX ORDER — POLYETHYLENE GLYCOL 3350 17 G/17G
1 POWDER, FOR SOLUTION ORAL
Status: DISCONTINUED | OUTPATIENT
Start: 2017-11-24 | End: 2017-11-25 | Stop reason: HOSPADM

## 2017-11-24 RX ORDER — GUAIFENESIN 600 MG/1
1200 TABLET, EXTENDED RELEASE ORAL EVERY 12 HOURS
Status: DISCONTINUED | OUTPATIENT
Start: 2017-11-24 | End: 2017-11-25 | Stop reason: HOSPADM

## 2017-11-24 RX ORDER — DEXTROSE MONOHYDRATE 25 G/50ML
25 INJECTION, SOLUTION INTRAVENOUS
Status: DISCONTINUED | OUTPATIENT
Start: 2017-11-24 | End: 2017-11-25 | Stop reason: HOSPADM

## 2017-11-24 RX ORDER — AMOXICILLIN 250 MG
2 CAPSULE ORAL 2 TIMES DAILY
Status: DISCONTINUED | OUTPATIENT
Start: 2017-11-24 | End: 2017-11-25 | Stop reason: HOSPADM

## 2017-11-24 RX ADMIN — ESCITALOPRAM OXALATE 20 MG: 10 TABLET ORAL at 08:10

## 2017-11-24 RX ADMIN — GUAIFENESIN 1200 MG: 600 TABLET, EXTENDED RELEASE ORAL at 08:12

## 2017-11-24 RX ADMIN — IPRATROPIUM BROMIDE AND ALBUTEROL SULFATE 3 ML: .5; 3 SOLUTION RESPIRATORY (INHALATION) at 04:48

## 2017-11-24 RX ADMIN — METHYLPREDNISOLONE SODIUM SUCCINATE 80 MG: 40 INJECTION, POWDER, FOR SOLUTION INTRAMUSCULAR; INTRAVENOUS at 22:08

## 2017-11-24 RX ADMIN — INSULIN LISPRO 2 UNITS: 100 INJECTION, SOLUTION INTRAVENOUS; SUBCUTANEOUS at 11:37

## 2017-11-24 RX ADMIN — METHYLPREDNISOLONE SODIUM SUCCINATE 40 MG: 40 INJECTION, POWDER, FOR SOLUTION INTRAMUSCULAR; INTRAVENOUS at 01:10

## 2017-11-24 RX ADMIN — IPRATROPIUM BROMIDE AND ALBUTEROL SULFATE 3 ML: .5; 3 SOLUTION RESPIRATORY (INHALATION) at 14:22

## 2017-11-24 RX ADMIN — ENOXAPARIN SODIUM 40 MG: 100 INJECTION SUBCUTANEOUS at 08:09

## 2017-11-24 RX ADMIN — AZITHROMYCIN MONOHYDRATE 500 MG: 500 INJECTION, POWDER, LYOPHILIZED, FOR SOLUTION INTRAVENOUS at 00:24

## 2017-11-24 RX ADMIN — GUAIFENESIN 1200 MG: 600 TABLET, EXTENDED RELEASE ORAL at 20:38

## 2017-11-24 RX ADMIN — CEFTRIAXONE SODIUM 2 G: 2 INJECTION, POWDER, FOR SOLUTION INTRAMUSCULAR; INTRAVENOUS at 00:23

## 2017-11-24 RX ADMIN — CEFTRIAXONE 2 G: 2 INJECTION, POWDER, FOR SOLUTION INTRAMUSCULAR; INTRAVENOUS at 22:09

## 2017-11-24 RX ADMIN — IPRATROPIUM BROMIDE AND ALBUTEROL SULFATE 3 ML: .5; 3 SOLUTION RESPIRATORY (INHALATION) at 19:08

## 2017-11-24 RX ADMIN — INSULIN LISPRO 3 UNITS: 100 INJECTION, SOLUTION INTRAVENOUS; SUBCUTANEOUS at 20:40

## 2017-11-24 RX ADMIN — IPRATROPIUM BROMIDE AND ALBUTEROL SULFATE 3 ML: .5; 3 SOLUTION RESPIRATORY (INHALATION) at 07:38

## 2017-11-24 RX ADMIN — IPRATROPIUM BROMIDE AND ALBUTEROL SULFATE 3 ML: .5; 3 SOLUTION RESPIRATORY (INHALATION) at 10:25

## 2017-11-24 RX ADMIN — IPRATROPIUM BROMIDE AND ALBUTEROL SULFATE 3 ML: .5; 3 SOLUTION RESPIRATORY (INHALATION) at 22:39

## 2017-11-24 RX ADMIN — METHYLPREDNISOLONE SODIUM SUCCINATE 40 MG: 40 INJECTION, POWDER, FOR SOLUTION INTRAMUSCULAR; INTRAVENOUS at 08:10

## 2017-11-24 RX ADMIN — DEXTROSE MONOHYDRATE 500 MG: 50 INJECTION, SOLUTION INTRAVENOUS at 20:39

## 2017-11-24 RX ADMIN — METHYLPREDNISOLONE SODIUM SUCCINATE 80 MG: 40 INJECTION, POWDER, FOR SOLUTION INTRAMUSCULAR; INTRAVENOUS at 14:39

## 2017-11-24 ASSESSMENT — PATIENT HEALTH QUESTIONNAIRE - PHQ9
1. LITTLE INTEREST OR PLEASURE IN DOING THINGS: NOT AT ALL
SUM OF ALL RESPONSES TO PHQ9 QUESTIONS 1 AND 2: 0
SUM OF ALL RESPONSES TO PHQ QUESTIONS 1-9: 0
2. FEELING DOWN, DEPRESSED, IRRITABLE, OR HOPELESS: NOT AT ALL
2. FEELING DOWN, DEPRESSED, IRRITABLE, OR HOPELESS: NOT AT ALL
1. LITTLE INTEREST OR PLEASURE IN DOING THINGS: NOT AT ALL
SUM OF ALL RESPONSES TO PHQ QUESTIONS 1-9: 0
SUM OF ALL RESPONSES TO PHQ9 QUESTIONS 1 AND 2: 0

## 2017-11-24 ASSESSMENT — PAIN SCALES - GENERAL
PAINLEVEL_OUTOF10: 0

## 2017-11-24 ASSESSMENT — COPD QUESTIONNAIRES
DURING THE PAST 4 WEEKS HOW MUCH DID YOU FEEL SHORT OF BREATH: SOME OF THE TIME
COPD SCREENING SCORE: 5
HAVE YOU SMOKED AT LEAST 100 CIGARETTES IN YOUR ENTIRE LIFE: YES
DO YOU EVER COUGH UP ANY MUCUS OR PHLEGM?: NO/ONLY WITH OCCASIONAL COLDS OR INFECTIONS

## 2017-11-24 ASSESSMENT — LIFESTYLE VARIABLES
EVER_SMOKED: YES
EVER_SMOKED: YES
ALCOHOL_USE: NO

## 2017-11-24 NOTE — FLOWSHEET NOTE
11/24/17 1025   [employer]N [employer]   #SVN Performed Yes   Given By: Mouthpiece   Chest Exam   Respiration 18   Pulse 72   Breath Sounds   Pre/Post Intervention (No change in breath sounds after treatment)   RUL Breath Sounds Expiratory Wheezes   RML Breath Sounds Expiratory Wheezes   RLL Breath Sounds Expiratory Wheezes   AYDEN Breath Sounds Expiratory Wheezes   LLL Breath Sounds Expiratory Wheezes   Oxygen   Home O2 Use Prior To Admission? No   Pulse Oximetry 96 %   O2 (LPM) 2   O2 Daily Delivery Respiratory  Silicone Nasal Cannula

## 2017-11-24 NOTE — ED PROVIDER NOTES
ED Provider Note    CHIEF COMPLAINT  Chief Complaint   Patient presents with   • Shortness of Breath     seen yesterday   • Cough     6 days       HPI  Latia Almanza is a 52 y.o. female who presents For evaluation of ongoing cough or shortness of breath, onset was about 6 days ago. She says that she has never been formally diagnosed with asthma but has had symptoms consistent with asthma. Over the past 6 days she's been experiencing congestion in her chest with a basically nonproductive cough and progressive shortness of breath. She has had some nasal congestion and rhinorrhea as well. No documented fever but has had some hot flashes. The patient went to urgent care yesterday where she apparently had a negative chest x-ray was prescribed prednisone, and inhaler as well as cough syrup. She states she is not improving. She does not have chest pain, no back pain, she offers no other specific complaints at this time. She denies unilateral leg pain and swelling.    REVIEW OF SYSTEMS  Negative for fever, rash, chest pain, abdominal pain, vomiting, diarrhea, headache, focal weakness, focal numbness, focal tingling, back pain. All other systems are negative.     PAST MEDICAL HISTORY  Past Medical History:   Diagnosis Date   • Allergic rhinitis    • Anxiety    • Back pain     back/ right hip   • Bronchitis 5/2016   • Dental disorder     upper implants and bridge   • Obesity    • Pneumonia 5/2016   • Restless leg syndrome    • Sarcoidosis (CMS-HCC)    • Sciatica    • Sleep apnea     CPAP   • Snoring        FAMILY HISTORY  Family History   Problem Relation Age of Onset   • Cancer Mother    • Lung Disease Mother    • Diabetes Mother    • Diabetes Father    • Heart Disease Father        SOCIAL HISTORY  Social History   Substance Use Topics   • Smoking status: Former Smoker     Packs/day: 0.25     Years: 5.00     Types: Cigarettes     Quit date: 1/20/2001   • Smokeless tobacco: Never Used   • Alcohol use 1.2 oz/week     2 Cans  "of beer per week      Comment: 4 per week       SURGICAL HISTORY  Past Surgical History:   Procedure Laterality Date   • LUMBAR FUSION ANTERIOR  10/27/2016    Procedure: LUMBAR FUSION ANTERIOR L5-S1 ALIF;  Surgeon: Ronal Washington III, M.D.;  Location: SURGERY [facility];  Service:    • ABDOMINAL HYSTERECTOMY TOTAL     • ATHROPLASTY     • HIP ARTHROPLASTY TOTAL     • HIP REPLACEMENT, TOTAL     • HYSTERECTOMY LAPAROSCOPY     • LAPAROTOMY     • OTHER      dental implants   • OTHER ORTHOPEDIC SURGERY      R hip replacement March 2008   • SINUSCOPE         CURRENT MEDICATIONS  I personally reviewed the medication list in the charting documentation.     ALLERGIES  No Known Allergies    MEDICAL RECORD  I have reviewed patient's medical record and pertinent results are listed above.      PHYSICAL EXAM  VITAL SIGNS: BP (!) 183/91   Pulse 88   Resp 20   Ht 1.778 m (5' 10\")   Wt (!) 144.2 kg (318 lb)   SpO2 91%   BMI 45.63 kg/m²    Constitutional: Well appearing patient in no acute distress.  Not toxic, nor ill in appearance.  HENT: Mucus membranes moist.    Eyes: No scleral icterus. Normal conjunctiva   Neck: Supple, comfortable, nonpainful range of motion.   Cardiovascular: Regular heart rate and rhythm.   Thorax & Lungs: Good air movement, scattered expiratory wheezes but no inspiratory wheezes, no focal rales. No respiratory distress.  Abdomen: Soft, with no tenderness, rebound nor guarding.  No mass or pulsatile mass appreciated.  Skin: Warm, dry. No rash appreciated  Extremities/Musculoskeletal: No sign of trauma. No asymmetric calf tenderness, erythema or edema. Normal range of motion   Neurologic: Alert & oriented. No focal deficits observed.   Psychiatric: Normal affect appropriate for the clinical situation.    DIAGNOSTIC STUDIES / PROCEDURES    EKG  12 Lead EKG interpreted by me to show:    Rate 90  Rhythm: Normal sinus rhythm  Axis: Normal  DC and QRS Intervals: Normal  T waves: No acute changes  ST " segments: No acute changes  Ectopy: None.    My impression of this EKG: Does not indicate ischemia or arrythmia at this time.      LABS  Results for orders placed or performed during the hospital encounter of 11/23/17   CBC w/ Differential   Result Value Ref Range    WBC 7.2 4.8 - 10.8 K/uL    RBC 4.88 4.20 - 5.40 M/uL    Hemoglobin 14.4 12.0 - 16.0 g/dL    Hematocrit 43.7 37.0 - 47.0 %    MCV 89.5 81.4 - 97.8 fL    MCH 29.5 27.0 - 33.0 pg    MCHC 33.0 (L) 33.6 - 35.0 g/dL    RDW 44.8 35.9 - 50.0 fL    Platelet Count 374 164 - 446 K/uL    MPV 9.5 9.0 - 12.9 fL    Neutrophils-Polys 84.70 (H) 44.00 - 72.00 %    Lymphocytes 11.20 (L) 22.00 - 41.00 %    Monocytes 3.90 0.00 - 13.40 %    Eosinophils 0.00 0.00 - 6.90 %    Basophils 0.10 0.00 - 1.80 %    Immature Granulocytes 0.10 0.00 - 0.90 %    Nucleated RBC 0.00 /100 WBC    Neutrophils (Absolute) 6.06 2.00 - 7.15 K/uL    Lymphs (Absolute) 0.80 (L) 1.00 - 4.80 K/uL    Monos (Absolute) 0.28 0.00 - 0.85 K/uL    Eos (Absolute) 0.00 0.00 - 0.51 K/uL    Baso (Absolute) 0.01 0.00 - 0.12 K/uL    Immature Granulocytes (abs) 0.01 0.00 - 0.11 K/uL    NRBC (Absolute) 0.00 K/uL   Complete Metabolic Panel (CMP)   Result Value Ref Range    Sodium 138 135 - 145 mmol/L    Potassium 4.2 3.6 - 5.5 mmol/L    Chloride 103 96 - 112 mmol/L    Co2 18 (L) 20 - 33 mmol/L    Anion Gap 17.0 (H) 0.0 - 11.9    Glucose 180 (H) 65 - 99 mg/dL    Bun 17 8 - 22 mg/dL    Creatinine 1.20 0.50 - 1.40 mg/dL    Calcium 9.0 8.4 - 10.2 mg/dL    AST(SGOT) 31 12 - 45 U/L    ALT(SGPT) 34 2 - 50 U/L    Alkaline Phosphatase 90 30 - 99 U/L    Total Bilirubin 0.4 0.1 - 1.5 mg/dL    Albumin 4.4 3.2 - 4.9 g/dL    Total Protein 8.1 6.0 - 8.2 g/dL    Globulin 3.7 (H) 1.9 - 3.5 g/dL    A-G Ratio 1.2 g/dL   Troponin STAT   Result Value Ref Range    Troponin I <0.02 0.00 - 0.04 ng/mL   ESTIMATED GFR   Result Value Ref Range    GFR If  57 (A) >60 mL/min/1.73 m 2    GFR If Non  47 (A) >60  mL/min/1.73 m 2   INFLUENZA RAPID   Result Value Ref Range    Significant Indicator NEG     Source RESP     Site RESPIRATORY     Rapid Influenza A-B       Negative for Influenza A and Influenza B antigens.  Infection due to influenza A or B cannot be ruled out  since the antigen present in the specimen may be below the  detection limit of the test. Culture confirmation of  negative samples is recommended.     EKG (NOW)   Result Value Ref Range    Report       [facility] Emergency Dept.    Test Date:  2017  Pt Name:    AARON GARCIA              Department: [facility]  MRN:        [id number]                      [facility]  Gender:     F                            Technician: BETH  :        [id number]                   Requested By:ARASH ARRINGTON  Order #:    [id number]                    Reading MD:    Measurements  Intervals                                Axis  Rate:       90                           P:          27  WV:         148                          QRS:        19  QRSD:       88                           T:          46  QT:         356  QTc:        436    Interpretive Statements  SINUS RHYTHM  RSR' IN V1 OR V2, PROBABLY NORMAL VARIANT  BASELINE WANDER IN LEAD(S) II,III,aVF,V2,V4,V5  Compared to ECG 2017 13:55:31  Right ventricular hypertrophy no longer present           RADIOLOGY  CT-CTA CHEST PULMONARY ARTERY W/ RECONS   Final Result      1.  Innumerable ill-defined bronchocentric pulmonary nodule      2.  Differential diagnosis includes bronchopneumonia or inflammatory process      3.  Negative for pulmonary embolism      4.  Fatty infiltration of the liver and hepatomegaly            DX-CHEST-2 VIEWS   Final Result      No acute cardiopulmonary abnormality identified.            COURSE & MEDICAL DECISION MAKING  I have reviewed any medical record information, laboratory studies and radiographic results as noted above.  Differential diagnoses includes:  Pneumonia, pneumothorax, bronchitis, anemia    Encounter Summary: This is a 52 y.o. female with shortness of breath and wheezing, 6 days, went to urgent care yesterday where she apparently had a negative x-ray, she was prescribed a cough syrup which she has finished as well as prednisone. On exam she has some wheezing but otherwise appears well. The patient is hypoxic requiring supplemental oxygen, she's not tachycardic. I have no evidence of DVT on exam. Will administer breathing treatment, repeat an x-ray, check blood work and ultimately reevaluate ------ blood work is largely unremarkable and the chest x-ray is within normal limits. I reevaluated the patient and she is hypoxic with oxygen saturations in the mid 80s even on 3 L of nasal cannula oxygen. She is not feeling better after the breathing treatment. A CT scan will be obtained to evaluate for pulmonary embolism as well as other pathology ------ the CT scan reveals innumerable ill-defined bronchocentric pulmonary nodules with a differential provided by the radiologist including bronchopneumonia versus other inflammatory process. At this point I think the best course of action will be treatment broad-spectrum antibiotics including ceftriaxone and azithromycin admission to the hospital given her persistent hypoxia      DISPOSITION: Admitted in guarded condition      FINAL IMPRESSION  1. Bronchopneumonia    2. Hypoxia           This dictation was created using voice recognition software. The accuracy of the dictation is limited to the abilities of the software. I expect there may be some errors of grammar and possibly content. The nursing notes were reviewed and certain aspects of this information were incorporated into this note.    Electronically signed by: Clint Brower, 11/23/2017 8:52 PM

## 2017-11-24 NOTE — H&P
ADMISSION DATE:  11/23/2017.    HISTORY OF PRESENT ILLNESS:  This is a 52-year-old female who had come to the   emergency room with a complaint of shortness of breath, cough, wheezing.  The   patient states that it has been going on for the past few days.  The patient   also states that she went to the urgent care yesterday and was given   prednisone and some breathing treatment.  The patient states, however, she was   sent home.  The patient says that the cough has been getting worse, the   shortness of breath also has been getting worse, and has had fever and chills.    Fever is subjective.  The patient states that she decided to come to the   emergency room.  In the ER, the patient was diagnosed with pneumonia as per   CAT scan of the chest and is being admitted to the hospital for further   management.  The patient also says that she has been wheezing.  She believes   that she has underlying asthma.    PAST MEDICAL HISTORY:  Depression and probable asthma.    SOCIAL HISTORY:  Denies any tobacco use, admits to occasional alcohol, and   denies any drug use.    FAMILY HISTORY:  Reviewed, nonsignificant.    MEDICATIONS:  Prednisone 40 mg p.o. daily, taper was given to the patient for   5 days.  Also, guaifenesin p.r.n., albuterol p.r.n., estradiol 2 times a week.    REVIEW OF SYSTEMS:  All 14 systems reviewed, all were negative except what I   mentioned in the history of present illness.    PHYSICAL EXAMINATION:  VITAL SIGNS:  O2 saturation 98% on oxygen, pulse of 64, respiratory rate 19,   blood pressure of 133/79.  GENERAL:  The patient is obese, awake, alert, oriented x3.  NEUROLOGIC:  Cranial nerves II-XII intact.  HEAD AND NECK:  Supple.  Lips are dry.  No jugular venous distention noted.  CARDIOVASCULAR:  S1, S2, regular.  LUNGS:  Rhonchi noted diffusely.  Expiratory wheezing noted diffusely.  ABDOMEN:  Obese, soft, and nontender.  EXTREMITIES:  Trace edema noted in mid tibia bilaterally.    LABORATORY DATA:   WBC of 7.2, H and H 14.4 and 43.7, platelets of 374,000.    Sodium is 138, potassium 4.2, chloride 103, bicarbonate of 18, BUN of 17,   creatinine of 1.2, troponin less than 0.02.    IMAGING:  A CAT scan of the chest was done that showed innumerable ill-defined   bronchocentric pulmonary nodules.  Differential diagnosis includes   bronchopneumonia or infiltrative process.  Negative for pulmonary embolism,   fatty infiltration of the liver and hepatosplenomegaly.    ASSESSMENT AND PLAN:  This is a 52-year-old female with pneumonia,  1.  Admit.  2.  Rocephin.  3.  Zithromax.  4.  O2.    For cough, we will start the patient on Hycodan p.r.n.    For asthma,  1.  Acute exacerbation.  2.  Respiratory protocol.  3.  Solu-Medrol was started on the patient.    For deep venous thrombosis prophylaxis, the patient is on Lovenox 40 mg   subcutaneously daily.       ____________________________________     Bessy Torrez MD    [facility]    DD:  11/24/2017 00:26:09  DT:  11/24/2017 00:52:33    D#:  [id number]  Job#:  [id number]

## 2017-11-24 NOTE — PROGRESS NOTES
Admitted after Midnight:    51 YO morbidly obese non smoker w/ h/o Sleep apnea and depression presented w/ wheezing and subjective fever. She was found to have bronchopneumonia on CT. She is mildly hypoxic on RA (91%). She is noted to have Glucose of 180 on Chemistry panel but carries no h/o DM. She did NOT meet sepsis criteria on presentation.    CT images, Labs, Meds and VS reviewed  Problem list updated  POC modified to include CC diet, SSI, Hgb A1c and Mucinex    Plan of care discussed with the patient, she was apparently feeling better but still has bilateral wheezes.    Steroids were increased. Sputum culture ordered

## 2017-11-24 NOTE — PROGRESS NOTES
Received Bedside report. Assumed care at 0715. This pt is AOx4, ambulatory without assistance, no N/V this morning, voiding, declines pain. Patient and RN discussed plan of care including IV abx/steroids: questions answered. Labs noted, assessment complete, patient tolerating consistent carb diet. Call light in place, fall precautions in place, patient educated on importance of calling for assistance. No additional needs at this time. VSS

## 2017-11-24 NOTE — ED NOTES
Report to Kaitlyn OWENS on medical floor. Patient transported on [facility] with oxygen and all her belongings to room 201 bed 1.

## 2017-11-24 NOTE — ED NOTES
Patient in bed, complains of cough, nausea and congestion for past six day. Seen yesterday and provided codeine-guaifenesin liquid, prednisone, and inhaler tried all to, but still felt short of breath. ERP in to see her.

## 2017-11-24 NOTE — CARE PLAN
Problem: Safety  Goal: Will remain free from injury  Outcome: PROGRESSING AS EXPECTED  Mobility assessed. Pt out of bed without assistance. Educated on the importance of calling for assistance, verbalizes understanding. Upper bed rails up x2, bed in low/locked position, hourly rounding in place, treaded socks on, call light/belongings within reach.     Problem: Respiratory:  Goal: Respiratory status will improve  Outcome: PROGRESSING AS EXPECTED  O2 in place as needed. RT protocol. Encouraging cough/turn/deep breathe.

## 2017-11-24 NOTE — PROGRESS NOTES
Pt arrived on unit. Pt ambulated to bed from ER [facility]. 2 L oxygen via NC in place. IV antibiotics infusing. Questions answered. Safety measures in place. Will continue to monitor.

## 2017-11-24 NOTE — ED NOTES
Patient has no changes in condition, and no complaints at this time. IV antibiotics given per MAR.

## 2017-11-24 NOTE — FLOWSHEET NOTE
11/23/17 2110   Events/Summary/Plan   Events/Summary/Plan SVN ER   Interdisciplinary Plan of Care-Goals (Indications)   Obstructive Ventilatory Defect or Pulmonary Disease without Obvious Obstruction Strong Subjective / Objective Improvement   Interdisciplinary Plan of Care-Outcomes    Bronchodilator Outcome Patient at Stable Baseline   Education   Education Yes - Pt. / Family has been Instructed in use of Respiratory Equipment;Yes - Pt. / Family has been Instructed in use of Respiratory Medications and Adverse Reactions   RT Assessment of Delivered Medications   Evaluation of Medication Delivery Daily Yes-- Pt /Family has been Instructed in use of Respiratory Medications and Adverse Reactions   SVN Group   #SVN Performed Yes   Given By: Mouthpiece   Date SVN Last Changed 11/23/17   Date SVN Next Change Due (Q 7 Days) 11/30/17   Respiratory WDL   Respiratory (WDL) X   Chest Exam   Respiration 18   Pulse 72   Heart Rate (Monitored) 74   Breath Sounds   Pre/Post Intervention Post Intervention Assessment   RUL Breath Sounds Diminished   RML Breath Sounds Diminished   RLL Breath Sounds Diminished   AYDEN Breath Sounds Diminished   LLL Breath Sounds Diminished   Oximetry   Continuous Oximetry Yes   Oxygen   Pulse Oximetry 90 %   O2 (LPM) 2   O2 Daily Delivery Respiratory  Silicone Nasal Cannula

## 2017-11-24 NOTE — FLOWSHEET NOTE
11/24/17 0739   Interdisciplinary Plan of Care-Goals (Indications)   Obstructive Ventilatory Defect or Pulmonary Disease without Obvious Obstruction Physical Exam / Hyperinflation / Wheezing (bronchospasm)   RT Assessment of Delivered Medications   Evaluation of Medication Delivery Daily Yes-- Pt /Family has been Instructed in use of Respiratory Medications and Adverse Reactions   SVN Group   #SVN Performed Yes   Given By: Mask   Chest Exam   Work Of Breathing / Effort Mild  (increased SOB when walking to bathroom)   Respiration 18   Pulse 70   Breath Sounds   Pre/Post Intervention (wheeze persists after resp treatment)   RUL Breath Sounds Expiratory Wheezes   RML Breath Sounds Expiratory Wheezes   RLL Breath Sounds Expiratory Wheezes;Diminished   AYDEN Breath Sounds Expiratory Wheezes   LLL Breath Sounds Expiratory Wheezes;Diminished   Secretions   Cough Congested;Moist;Non Productive  (frequent cough)   Oximetry   #Pulse Oximetry (Single Determination) Yes   Oxygen   Home O2 Use Prior To Admission? No   Pulse Oximetry 98 %   O2 (LPM) 3   O2 Daily Delivery Respiratory  Silicone Nasal Cannula

## 2017-11-24 NOTE — CARE PLAN
Problem: Knowledge Deficit  Goal: Knowledge of disease process/condition, treatment plan, diagnostic tests, and medications will improve  Outcome: PROGRESSING AS EXPECTED  POC reviewed with pt. Pt verbalized understanding.    Problem: Respiratory:  Goal: Respiratory status will improve  Outcome: PROGRESSING AS EXPECTED  Respiratory status assessed. Pt encouraged to call if feeling SOB. Pt verbalized understanding.

## 2017-11-24 NOTE — DIETARY
NUTRITION SERVICES: BMI - Pt with BMI >40 (45.63). Weight loss counseling not appropriate in acute care setting. RECOMMEND - Referral to outpatient nutrition services for weight management after D/C.

## 2017-11-25 ENCOUNTER — PATIENT OUTREACH (OUTPATIENT)
Dept: HEALTH INFORMATION MANAGEMENT | Facility: OTHER | Age: 52
End: 2017-11-25

## 2017-11-25 VITALS
WEIGHT: 293 LBS | OXYGEN SATURATION: 92 % | DIASTOLIC BLOOD PRESSURE: 83 MMHG | HEIGHT: 70 IN | TEMPERATURE: 98 F | SYSTOLIC BLOOD PRESSURE: 153 MMHG | RESPIRATION RATE: 16 BRPM | HEART RATE: 73 BPM | BODY MASS INDEX: 41.95 KG/M2

## 2017-11-25 LAB
ANION GAP SERPL CALC-SCNC: 9 MMOL/L (ref 0–11.9)
BASOPHILS # BLD AUTO: 0.1 % (ref 0–1.8)
BASOPHILS # BLD: 0.01 K/UL (ref 0–0.12)
BUN SERPL-MCNC: 16 MG/DL (ref 8–22)
CALCIUM SERPL-MCNC: 8.6 MG/DL (ref 8.4–10.2)
CHLORIDE SERPL-SCNC: 103 MMOL/L (ref 96–112)
CO2 SERPL-SCNC: 24 MMOL/L (ref 20–33)
CREAT SERPL-MCNC: 0.97 MG/DL (ref 0.5–1.4)
EOSINOPHIL # BLD AUTO: 0 K/UL (ref 0–0.51)
EOSINOPHIL NFR BLD: 0 % (ref 0–6.9)
ERYTHROCYTE [DISTWIDTH] IN BLOOD BY AUTOMATED COUNT: 45.3 FL (ref 35.9–50)
GFR SERPL CREATININE-BSD FRML MDRD: 60 ML/MIN/1.73 M 2
GLUCOSE BLD-MCNC: 169 MG/DL (ref 65–99)
GLUCOSE SERPL-MCNC: 173 MG/DL (ref 65–99)
GRAM STN SPEC: NORMAL
HCT VFR BLD AUTO: 37.5 % (ref 37–47)
HGB BLD-MCNC: 12.4 G/DL (ref 12–16)
IMM GRANULOCYTES # BLD AUTO: 0.03 K/UL (ref 0–0.11)
IMM GRANULOCYTES NFR BLD AUTO: 0.3 % (ref 0–0.9)
LYMPHOCYTES # BLD AUTO: 0.79 K/UL (ref 1–4.8)
LYMPHOCYTES NFR BLD: 9.2 % (ref 22–41)
MCH RBC QN AUTO: 29.5 PG (ref 27–33)
MCHC RBC AUTO-ENTMCNC: 33.1 G/DL (ref 33.6–35)
MCV RBC AUTO: 89.3 FL (ref 81.4–97.8)
MONOCYTES # BLD AUTO: 0.28 K/UL (ref 0–0.85)
MONOCYTES NFR BLD AUTO: 3.3 % (ref 0–13.4)
NEUTROPHILS # BLD AUTO: 7.48 K/UL (ref 2–7.15)
NEUTROPHILS NFR BLD: 87.1 % (ref 44–72)
NRBC # BLD AUTO: 0 K/UL
NRBC BLD AUTO-RTO: 0 /100 WBC
PLATELET # BLD AUTO: 311 K/UL (ref 164–446)
PMV BLD AUTO: 9.8 FL (ref 9–12.9)
POTASSIUM SERPL-SCNC: 4.3 MMOL/L (ref 3.6–5.5)
RBC # BLD AUTO: 4.2 M/UL (ref 4.2–5.4)
SIGNIFICANT IND 70042: NORMAL
SITE SITE: NORMAL
SODIUM SERPL-SCNC: 136 MMOL/L (ref 135–145)
SOURCE SOURCE: NORMAL
WBC # BLD AUTO: 8.6 K/UL (ref 4.8–10.8)

## 2017-11-25 PROCEDURE — 94640 AIRWAY INHALATION TREATMENT: CPT

## 2017-11-25 PROCEDURE — 700102 HCHG RX REV CODE 250 W/ 637 OVERRIDE(OP): Performed by: FAMILY MEDICINE

## 2017-11-25 PROCEDURE — 700111 HCHG RX REV CODE 636 W/ 250 OVERRIDE (IP): Performed by: INTERNAL MEDICINE

## 2017-11-25 PROCEDURE — 36415 COLL VENOUS BLD VENIPUNCTURE: CPT

## 2017-11-25 PROCEDURE — 82962 GLUCOSE BLOOD TEST: CPT

## 2017-11-25 PROCEDURE — 94760 N-INVAS EAR/PLS OXIMETRY 1: CPT

## 2017-11-25 PROCEDURE — 85025 COMPLETE CBC W/AUTO DIFF WBC: CPT

## 2017-11-25 PROCEDURE — A9270 NON-COVERED ITEM OR SERVICE: HCPCS | Performed by: FAMILY MEDICINE

## 2017-11-25 PROCEDURE — 99239 HOSP IP/OBS DSCHRG MGMT >30: CPT | Performed by: INTERNAL MEDICINE

## 2017-11-25 PROCEDURE — 700102 HCHG RX REV CODE 250 W/ 637 OVERRIDE(OP): Performed by: INTERNAL MEDICINE

## 2017-11-25 PROCEDURE — 700111 HCHG RX REV CODE 636 W/ 250 OVERRIDE (IP): Performed by: FAMILY MEDICINE

## 2017-11-25 PROCEDURE — 700101 HCHG RX REV CODE 250: Performed by: INTERNAL MEDICINE

## 2017-11-25 PROCEDURE — A9270 NON-COVERED ITEM OR SERVICE: HCPCS | Performed by: INTERNAL MEDICINE

## 2017-11-25 PROCEDURE — 80048 BASIC METABOLIC PNL TOTAL CA: CPT

## 2017-11-25 RX ORDER — GUAIFENESIN 1200 MG/1
1200 TABLET, EXTENDED RELEASE ORAL EVERY 12 HOURS
Qty: 28 TAB | COMMUNITY
Start: 2017-11-25 | End: 2018-01-28

## 2017-11-25 RX ORDER — DOXYCYCLINE 100 MG/1
100 CAPSULE ORAL 2 TIMES DAILY
Qty: 20 CAP | Refills: 0 | Status: SHIPPED | OUTPATIENT
Start: 2017-11-25 | End: 2018-01-28

## 2017-11-25 RX ORDER — METHYLPREDNISOLONE 4 MG/1
TABLET ORAL
Qty: 30 TAB | Refills: 0 | Status: SHIPPED | OUTPATIENT
Start: 2017-11-25 | End: 2018-01-28

## 2017-11-25 RX ADMIN — IPRATROPIUM BROMIDE AND ALBUTEROL SULFATE 3 ML: .5; 3 SOLUTION RESPIRATORY (INHALATION) at 11:19

## 2017-11-25 RX ADMIN — INSULIN LISPRO 2 UNITS: 100 INJECTION, SOLUTION INTRAVENOUS; SUBCUTANEOUS at 11:48

## 2017-11-25 RX ADMIN — ENOXAPARIN SODIUM 40 MG: 100 INJECTION SUBCUTANEOUS at 08:23

## 2017-11-25 RX ADMIN — INSULIN LISPRO 2 UNITS: 100 INJECTION, SOLUTION INTRAVENOUS; SUBCUTANEOUS at 06:12

## 2017-11-25 RX ADMIN — IPRATROPIUM BROMIDE AND ALBUTEROL SULFATE 3 ML: .5; 3 SOLUTION RESPIRATORY (INHALATION) at 07:23

## 2017-11-25 RX ADMIN — ESCITALOPRAM OXALATE 20 MG: 10 TABLET ORAL at 08:23

## 2017-11-25 RX ADMIN — METHYLPREDNISOLONE SODIUM SUCCINATE 80 MG: 40 INJECTION, POWDER, FOR SOLUTION INTRAMUSCULAR; INTRAVENOUS at 06:12

## 2017-11-25 RX ADMIN — GUAIFENESIN 1200 MG: 600 TABLET, EXTENDED RELEASE ORAL at 08:23

## 2017-11-25 ASSESSMENT — LIFESTYLE VARIABLES: EVER_SMOKED: NEVER

## 2017-11-25 ASSESSMENT — PAIN SCALES - GENERAL: PAINLEVEL_OUTOF10: 0

## 2017-11-25 NOTE — FLOWSHEET NOTE
Peak flow improved at 300 today.  Wheeze persists but improved from yesterday.      11/25/17 1119   SVN Group   #SVN Performed Yes   Given By: Mouthpiece   Chest Exam   Respiration 16   Pulse 73   Breath Sounds   RUL Breath Sounds Expiratory Wheezes   RML Breath Sounds Expiratory Wheezes;Diminished   RLL Breath Sounds Expiratory Wheezes;Diminished   AYDEN Breath Sounds Expiratory Wheezes   LLL Breath Sounds Expiratory Wheezes;Diminished   Oxygen   Pulse Oximetry 92 %   O2 (LPM) 0   O2 Daily Delivery Respiratory  Room Air with O2 Available

## 2017-11-25 NOTE — FLOWSHEET NOTE
11/24/17 5060   Events/Summary/Plan   Events/Summary/Plan SVn given    Interdisciplinary Plan of Care-Goals (Indications)   Obstructive Ventilatory Defect or Pulmonary Disease without Obvious Obstruction Physical Exam / Hyperinflation / Wheezing (bronchospasm)   Interdisciplinary Plan of Care-Outcomes    Bronchodilator Outcome Improvement in Airflow (peak flow, PFT)   Education   Education Yes - Pt. / Family has been Instructed in use of Respiratory Equipment;Yes - Pt. / Family has been Instructed in use of Respiratory Medications and Adverse Reactions   RT Assessment of Delivered Medications   Evaluation of Medication Delivery Daily Yes-- Pt /Family has been Instructed in use of Respiratory Medications and Adverse Reactions   SVN Group   #SVN Performed Yes   Given By: Mouthpiece   Respiratory WDL   Respiratory (WDL) X   Chest Exam   Work Of Breathing / Effort Mild   Respiration 18   Pulse 72   Breath Sounds   Pre/Post Intervention Pre Intervention Assessment   RUL Breath Sounds Expiratory Wheezes   RML Breath Sounds Expiratory Wheezes   RLL Breath Sounds Expiratory Wheezes   AYDEN Breath Sounds Expiratory Wheezes   LLL Breath Sounds Expiratory Wheezes   Secretions   Cough Non Productive;Strong   How Sputum Obtained Spontaneous   Sputum Amount Unable to Evaluate   Sputum Color Unable to Evaluate   Sputum Consistency Unable to Evaluate   Oximetry   #Pulse Oximetry (Single Determination) Yes   Oxygen   Pulse Oximetry 94 %   O2 (LPM) 0   O2 Daily Delivery Respiratory  Room Air with O2 Available  (2 lpm stand by at bedside for distress )

## 2017-11-25 NOTE — FLOWSHEET NOTE
11/25/17 0215   Events/Summary/Plan   Events/Summary/Plan Pt asleep on hospital provided cpap device, Pt refused SVN at this time    CPAP/BiPAP [facility]   Nocturnal CPAP or BiPAP CPAP   Home Unit Used? No   Equipment Inspected for Cleanliness, Operation, and Safety? Yes   Settings (If Known) 6-15   FiO2 or LPM 2   Home Mask Used? Yes   Respiratory WDL   Respiratory (WDL) X   Chest Exam   Work Of Breathing / Effort Mild   Breath Sounds   Pre/Post Intervention Pre Intervention Assessment   RUL Breath Sounds Expiratory Wheezes   RML Breath Sounds Expiratory Wheezes   RLL Breath Sounds Expiratory Wheezes   AYDEN Breath Sounds Expiratory Wheezes   LLL Breath Sounds Expiratory Wheezes

## 2017-11-25 NOTE — FLOWSHEET NOTE
11/25/17 0215   Events/Summary/Plan   Events/Summary/Plan Pt asleep on hospital provided cpap device, Pt refused SVN at this time    Therapy Not Performed   Type of Therapy Not Performed SVN   Reason Therapy Not Performed Refused   Chest Exam   Work Of Breathing / Effort Mild   Respiration 16   Pulse 76   Breath Sounds   Pre/Post Intervention Pre Intervention Assessment   RUL Breath Sounds Expiratory Wheezes   RML Breath Sounds Expiratory Wheezes   RLL Breath Sounds Expiratory Wheezes   AYDEN Breath Sounds Expiratory Wheezes   LLL Breath Sounds Expiratory Wheezes   Oximetry   #Pulse Oximetry (Single Determination) Yes   Oxygen   Pulse Oximetry 96 %   O2 (LPM) 2   O2 Daily Delivery Respiratory  (on cpap )

## 2017-11-25 NOTE — RESPIRATORY CARE
Bipap Note:      Patient was placed on hospital provided Cpap unit. Settings currently are Cpap [id number] and Fio2 2lpm. Patient can demonstrate removal of mask at this time. RN agrees. If patient becomes unable to remove Cpap, pt will be placed on supplimental O2 and MD will be notified of Pt change in status.t

## 2017-11-25 NOTE — PROGRESS NOTES
0745Report received, plan of care reviewed and discussed, assessment complete, oriented to room, bed alarm on, nonskid socks applied, advised to call for assistance.   0945 Discussed plan of care, encouraged and answered all questions, will continue to monitor.  1145 Discussed plans for discharge with MD at bedside.  1300 Discharged to home, all needs met.

## 2017-11-25 NOTE — DISCHARGE PLANNING
Medical Social Work    Referral: CASANDRA reviewed the chart this AM.      Intervention: Per flowsheet, pt lives alone and expects to d.c home.  No SS or DC needs at this time.      Plan: CASANDAR Kelsey available to assist with any d.c planning.    Care Transition Team Assessment    Information Source  Information Given By: Patient  Informant's Name: Latia    Readmission Evaluation  Is this a readmission?: No    Elopement Risk  Legal Hold: No  Ambulatory or Self Mobile in Wheelchair: Yes  Disoriented: No  Psychiatric Symptoms: None  History of Wandering: No  Elopement this Admit: No  Vocalizing Wanting to Leave: No  Displays Behaviors, Body Language Wanting to Leave: No-Not at Risk for Elopement  Elopement Risk: Not at Risk for Elopement    Interdisciplinary Discharge Planning  Does Admitting Nurse Feel This Could be a Complex Discharge?: No  Primary Care Physician: none  Lives with - Patient's Self Care Capacity: Alone and Able to Care For Self  Patient or legal guardian wants to designate a caregiver (see row info): No  Support Systems: Family Member(s), Friends / Neighbors  Housing / Facility: 1 Story Apartment / Condo  Do You Take your Prescribed Medications Regularly: Yes  Able to Return to Previous ADL's: Yes  Mobility Issues: No  Prior Services: None  Patient Expects to be Discharged to:: home  Assistance Needed: Unknown at this Time  Durable Medical Equipment: Not Applicable    Discharge Preparedness  What is your plan after discharge?: Uncertain - pending medical team collaboration  What are your discharge supports?: Parent, Sibling         Finances  Prescription Coverage: Yes    Vision / Hearing Impairment  Vision Impairment : Yes  Right Eye Vision: Impaired, Wears Glasses  Left Eye Vision: Impaired, Wears Glasses  Hearing Impairment : No    Values / Beliefs / Concerns  Values / Beliefs Concerns : No    Advance Directive  Advance Directive?: None    Domestic Abuse  Have you ever been the victim of abuse or  violence?: No  Physical Abuse or Sexual Abuse: No  Verbal Abuse or Emotional Abuse: No  Possible Abuse Reported to:: Not Applicable    Psychological Assessment  History of Substance Abuse: None

## 2017-11-25 NOTE — CARE PLAN
Problem: Oxygenation:  Goal: Maintain adequate oxygenation dependent on patient condition  Outcome: PROGRESSING AS EXPECTED    Titrate and maintain saturation by pulse oximetry >90%. Currently Pt is on room air with sats 94%. Supplimentla remains at bedside for acute respiratory distress and SOB.      Pt uses home Cpap, Pt will be placed on hospital provided cpap with home settings of [id number] with  O2 to keep sats >90%maintain home settings for fio2 **LPm, titrate for adequate saturation for Spo2 > 90%  Intervention: Manage oxygen therapy by monitoring pulse oximetry and/or ABG values    Pt will be placed on pulse Oximeter while cpap is in use   Intervention: Levels of oxygenation will improve to baseline  Pt baseline O2 is room air , Pt currently at baseline       Problem: Bronchoconstriction:  Goal: Improve in air movement and diminished wheezing  Outcome: PROGRESSING SLOWER THAN EXPECTED  Post treatment volume of air movement increased with continued moderate wheezing    Improved vital signs and measures of gas exchange    Improved patient appearance with subjective improvement of symptom alleviation after treatment.      Intervention: Implement inhaled treatments    Duoneb Q4 ATC   Intervention: Evaluate and manage medication effects  Pt reports that treatment helps. Objective response is increase air movement

## 2017-11-25 NOTE — CARE PLAN
Problem: Oxygenation:  Goal: Maintain adequate oxygenation dependent on patient condition    Intervention: Manage oxygen therapy by monitoring pulse oximetry and/or ABG values  Oximetry 96% on 2 L nasal cannula, patient does not use home oxygen.  Patient does use home CPAP auto-mode 6 to 15 cm.  Hospital CPAP machine placed in patient room as she did not bring her home CPAP.      Problem: Bronchoconstriction:  Goal: Improve in air movement and diminished wheezing  Patient is receiving Duoneb nebulizer Q 4 for asthma admission.  Patient is wheezing throughout the shift with no change in breath sounds or wheeze after nebulizer treatments.  She states the asthma diagnosis is new.  Pre and post peak flow done with peak flow 285 before treatment and peak flow 260 15 minutes after treatment.  Will continue to monitor patient.

## 2017-11-25 NOTE — DISCHARGE INSTRUCTIONS
Discharge Instructions    Discharged to home by car with relative. Discharged via walking, hospital escort: Refused.  Special equipment needed: Not Applicable    Be sure to schedule a follow-up appointment with your primary care doctor or any specialists as instructed.     Discharge Plan:   Diet Plan: Discussed  Activity Level: Discussed  Confirmed Follow up Appointment: Patient to Call and Schedule Appointment  Confirmed Symptoms Management: Discussed  Influenza Vaccine Indication: Patient Refuses    I understand that a diet low in cholesterol, fat, and sodium is recommended for good health. Unless I have been given specific instructions below for another diet, I accept this instruction as my diet prescription.     Special Instructions:   Pneumonia, Adult  Pneumonia is an infection of the lungs.   CAUSES  Pneumonia may be caused by bacteria or a virus. Usually, the infection is caused by breathing in droplets from an infected person's cough or sneeze.   SYMPTOMS   Symptoms of pneumonia include:  · Cough.  · Fever.  · Chest pain.  · Rapid breathing.  · Shortness of breath.  · Shaking chills.  · Mucus production.  DIAGNOSIS   If you have the common symptoms of pneumonia, often your health care provider will confirm the diagnosis with a chest X-ray. The X-ray will show an abnormality in the lung if you have pneumonia. Other tests may be done on your blood, urine, or mucus (sputum) to find the specific cause of your pneumonia. A blood gas test or pulse oximetry test may be needed to check how well your lungs are working.  TREATMENT   Your treatment will depend on whether your pneumonia is caused by bacteria or a virus.   · Bacterial pneumonia is treated with antibiotic medicine.  · Pneumonia that is caused by the influenza virus may be treated with an antiviral medicine.  · Pneumonia that is caused by a virus other than influenza will not respond to antibiotic medicine. This type of pneumonia will have to run its  course.   HOME CARE INSTRUCTIONS   · Cough suppressants may be used if you are losing too much rest from coughing at night. However, you should try to avoid taking cough suppresants. This is because coughing helps to remove mucus from your lungs.  · Sleep in a semi-upright position at night. Try sleeping in a reclining chair, or place a few pillows under your head.  · Try using a cold steam vaporizer or humidifier in your home or bedroom. This may help loosen your mucus.  · If you were prescribed an antibiotic medicine, finish all of it even if you start to feel better.  · If you were prescribed an expectorant, take it as directed by your health care provider. This medicine loosens the mucus so you can cough it up.  · Take medicines only as directed by your health care provider.  · Do not smoke. If you are a smoker and continue to smoke, your cough may last several weeks after your pneumonia has cleared.  · Get rest when you feel tired, or as needed.  PREVENTION  A pneumococcal shot (vaccine) is available to prevent a common bacterial cause of pneumonia. This is usually suggested for:  · People over 65 years old.  · People on chemotherapy.  · People with chronic lung problems, such as bronchitis or emphysema.  · People with immune system problems.  If you are over 65 years old or have a high risk condition, you may receive the pneumococcal vaccine if you have not received it before. In some countries, a routine influenza vaccine is also recommended. This vaccine can help prevent some cases of pneumonia. You may be offered the influenza vaccine as part of your care.  If you are a smoker, it is time to quit in order to prevent pneumonia in the future. You may receive instructions on how to stop smoking. Your health care provider can provide medicines and counseling to help you quit.  SEEK MEDICAL CARE IF:  · You have a fever.  · You cannot control your cough with suppressants at night, and you keep losing sleep.  SEEK  IMMEDIATE MEDICAL CARE IF:   · You have worsening shortness of breath.  · You have increased chest pain.  · Your sickness becomes worse, especially if you are an older adult or have a weakened immune system.  · You cough up blood.  · You have pain that is getting worse or is not controlled with medicines.  · Your symptoms are getting worse rather than better.     This information is not intended to replace advice given to you by your health care provider. Make sure you discuss any questions you have with your health care provider.     Document Released: 12/18/2006 Document Revised: 01/08/2016 Document Reviewed: 04/13/2016  [employer] Interactive Patient Education ©2016 [employer]    Methylprednisolone tablets  What is this medicine?  METHYLPREDNISOLONE (meth ill pred NISS oh lone) is a corticosteroid. It is commonly used to treat inflammation of the skin, joints, lungs, and other organs. Common conditions treated include asthma, allergies, and arthritis. It is also used for other conditions, such as blood disorders and diseases of the adrenal glands.  This medicine may be used for other purposes; ask your health care provider or pharmacist if you have questions.  COMMON BRAND NAME(S): Medrol Dosepak, Medrol  What should I tell my health care provider before I take this medicine?  They need to know if you have any of these conditions:  -Cushing's syndrome  -diabetes  -glaucoma  -heart problems or disease  -high blood pressure  -infection such as herpes, measles, tuberculosis, or chickenpox  -kidney disease  -liver disease  -mental problems  -myasthenia gravis  -osteoporosis  -seizures  -stomach ulcer or intestine disease including colitis and diverticulitis  -thyroid problem  -an unusual or allergic reaction to lactose, methylprednisolone, other medicines, foods, dyes, or preservatives  -pregnant or trying to get pregnant  -breast-feeding  How should I use this medicine?  Take this medicine by mouth with a drink of  water. Follow the directions on the prescription label. Take it with food or milk to avoid stomach upset. If you are taking this medicine once a day, take it in the morning. Do not take more medicine than you are told to take. Do not suddenly stop taking your medicine because you may develop a severe reaction. Your doctor will tell you how much medicine to take. If your doctor wants you to stop the medicine, the dose may be slowly lowered over time to avoid any side effects.  Talk to your pediatrician regarding the use of this medicine in children. Special care may be needed.  Overdosage: If you think you have taken too much of this medicine contact a poison control center or emergency room at once.  NOTE: This medicine is only for you. Do not share this medicine with others.  What if I miss a dose?  If you miss a dose, take it as soon as you can. If it is almost time for your next dose, talk to your doctor or health care professional. You may need to miss a dose or take an extra dose. Do not take double or extra doses without advice.  What may interact with this medicine?  Do not take this medicine with any of the following medications:  -mifepristone  This medicine may also interact with the following medications:  -tacrolimus  -vaccines  -warfarin  This list may not describe all possible interactions. Give your health care provider a list of all the medicines, herbs, non-prescription drugs, or dietary supplements you use. Also tell them if you smoke, drink alcohol, or use illegal drugs. Some items may interact with your medicine.  What should I watch for while using this medicine?  Visit your doctor or health care professional for regular checks on your progress. If you are taking this medicine for a long time, carry an identification card with your name and address, the type and dose of your medicine, and your doctor's name and address.  The medicine may increase your risk of getting an infection. Stay away from  people who are sick. Tell your doctor or health care professional if you are around anyone with measles or chickenpox.  If you are going to have surgery, tell your doctor or health care professional that you have taken this medicine within the last twelve months.  Ask your doctor or health care professional about your diet. You may need to lower the amount of salt you eat.  The medicine can increase your blood sugar. If you are a diabetic check with your doctor if you need help adjusting the dose of your diabetic medicine.  What side effects may I notice from receiving this medicine?  Side effects that you should report to your doctor or health care professional as soon as possible:  -allergic reactions like skin rash, itching or hives, swelling of the face, lips, or tongue  -eye pain, decreased or blurred vision, or bulging eyes  -fever, sore throat, sneezing, cough, or other signs of infection, wounds that will not heal  -increased thirst  -mental depression, mood swings, mistaken feelings of self importance or of being mistreated  -pain in hips, back, ribs, arms, shoulders, or legs  -swelling of the ankles, feet, hands  -trouble passing urine or change in the amount of urine  Side effects that usually do not require medical attention (report to your doctor or health care professional if they continue or are bothersome):  -confusion, excitement, restlessness  -headache  -nausea, vomiting  -skin problems, acne, thin and shiny skin  -weight gain  This list may not describe all possible side effects. Call your doctor for medical advice about side effects. You may report side effects to FDA at 7-004-FDA-0468.  Where should I keep my medicine?  Keep out of the reach of children.  Store at room temperature between 20 and 25 degrees C (68 and 77 degrees F). Throw away any unused medicine after the expiration date.  NOTE: This sheet is a summary. It may not cover all possible information. If you have questions about this  medicine, talk to your doctor, pharmacist, or health care provider.  © 2014, [employer]. (9/17/2013 11:38:34 AM)  Fluticasone; Salmeterol inhalation powder  What is this medicine?  FLUTICASONE; SALMETEROL (floo TIK a sone; sal ME te role) inhalation is a combination of two medicines that decrease inflammation and help to open up the airways of your lungs. It is used to treat COPD. This medicine is also used to treat asthma. Do NOT use for an acute asthma attack. Do NOT use for a COPD attack.  This medicine may be used for other purposes; ask your health care provider or pharmacist if you have questions.  COMMON BRAND NAME(S): Advair  What should I tell my health care provider before I take this medicine?  They need to know if you have any of these conditions:  -diabetes  -heart disease or irregular heartbeat  -high blood pressure  -infection  -osteoporosis or other bone disease  -pheochromocytoma  -seizures  -thyroid disease  -worsening asthma  -an unusual or allergic reaction to fluticasone, salmeterol, other corticosteroids, other medicines, foods, dyes, or preservatives  -pregnant or trying to get pregnant  -breast-feeding  How should I use this medicine?  This medicine is inhaled through the mouth. Rinse your mouth with water after use. Make sure not to swallow the water. Follow the directions on the prescription label. Do not use a spacer device with this inhaler. Take your medicine at regular intervals. Do not take your medicine more often than directed. Do not stop taking except on your doctor's advice. Make sure that you are using your inhaler correctly. Ask you doctor or health care provider if you have any questions.  A special MedGuide will be given to you by the pharmacist with each prescription and refill. Be sure to read this information carefully each time.  Talk to your pediatrician regarding the use of this medicine in children. Special care may be needed.  Overdosage: If you think you  have taken too much of this medicine contact a poison control center or emergency room at once.  NOTE: This medicine is only for you. Do not share this medicine with others.  What if I miss a dose?  If you miss a dose, use it as soon as you remember. If it is almost time for your next dose, use only that dose and continue with your regular schedule, spacing doses evenly. Do not use double or extra doses.  What may interact with this medicine?  Do not take this medicine with any of the following medications:  -MAOIs like Carbex, Eldepryl, Marplan, Nardil, and Parnate  This medicine may also interact with the following medications:  -aminophylline or theophylline  -antiviral medicines for HIV or AIDS  -diuretics  -medicines for colds  -medicines for depression or emotional conditions  -medicines for fungal infections like ketoconazole and itraconazole  -medicines for the heart like metoprolol, propanolol  -medicines for weight loss including some herbal products  -other medicine for breathing problems  -pimozide  -some antibiotics like clarithromycin, erythromycin, levofloxacin, linezolid, and telithromycin  -vaccines  This list may not describe all possible interactions. Give your health care provider a list of all the medicines, herbs, non-prescription drugs, or dietary supplements you use. Also tell them if you smoke, drink alcohol, or use illegal drugs. Some items may interact with your medicine.  What should I watch for while using this medicine?  Visit your doctor for regular check ups. Tell your doctor or health care professional if your symptoms do not get better. If your symptoms get worse or if you need your short-acting inhalers more often, call your doctor right away. Do not use this medicine more than every 12 hours.  If you have asthma, be aware that using this medicine may increase your risk of dying from asthma- related problems. Talk to your doctor about the risks and benefits of taking this medicine.  NEVER use this medicine for an acute asthma attack.  If you are going to have surgery tell your doctor or health care professional that you are using this medicine. Try not to come in contact with people with the chicken pox or measles. If you do, call your doctor.  What side effects may I notice from receiving this medicine?  Side effects that you should report to your doctor or health care professional as soon as possible:  -allergic reactions like skin rash or hives, swelling of the face, lips, or tongue  -chest pain  -dizziness or lightheaded  -fever or chills  -irregular heartbeat  -vision problems  Side effects that usually do not require medical attention (report to your doctor or health care professional if they continue or are bothersome):  -coughing, hoarseness, throat irritation  -headache  -nervousness  -stomach problems  -stuffy nose  -tremor  This list may not describe all possible side effects. Call your doctor for medical advice about side effects. You may report side effects to FDA at 6-171-FDA-1431.  Where should I keep my medicine?  Keep out of the reach of children.  Store at room temperature between 68 and 77 degrees F (20 and 25 degrees C). Do not leave your medicine in the heat or sun. Throw away 1 month after you open the package or whenever the dose indicator reads 0, whichever comes first. Throw away unopened packages after the expiration date.  NOTE: This sheet is a summary. It may not cover all possible information. If you have questions about this medicine, talk to your doctor, pharmacist, or health care provider.  © 2014, [employer]. (1/17/2014 11:34:03 AM)  Doxycycline tablets or capsules  What is this medicine?  DOXYCYCLINE (dox faiza clark) is a tetracycline antibiotic. It kills certain bacteria or stops their growth. It is used to treat many kinds of infections, like dental, skin, respiratory, and urinary tract infections. It also treats acne, Lyme disease, malaria, and  certain sexually transmitted infections.  This medicine may be used for other purposes; ask your health care provider or pharmacist if you have questions.  COMMON BRAND NAME(S): Adoxa CK, Adoxa Christopher, Adoxa TT, Adoxa, Alodox, Avidoxy, Doxal, Monodox, Morgidox 1x Kit, Morgidox 1x, Morgidox 2x , Morgidox 2x Kit, Ocudox , Vibra-Tabs, Vibramycin  What should I tell my health care provider before I take this medicine?  They need to know if you have any of these conditions:  -liver disease  -long exposure to sunlight like working outdoors  -stomach problems like colitis  -an unusual or allergic reaction to doxycycline, tetracycline antibiotics, other medicines, foods, dyes, or preservatives  -pregnant or trying to get pregnant  -breast-feeding  How should I use this medicine?  Take this medicine by mouth with a full glass of water. Follow the directions on the prescription label. It is best to take this medicine without food, but if it upsets your stomach take it with food. Take your medicine at regular intervals. Do not take your medicine more often than directed. Take all of your medicine as directed even if you think you are better. Do not skip doses or stop your medicine early.  Talk to your pediatrician regarding the use of this medicine in children. Special care may be needed. While this drug may be prescribed for children as young as 8 years old for selected conditions, precautions do apply.  Overdosage: If you think you have taken too much of this medicine contact a poison control center or emergency room at once.  NOTE: This medicine is only for you. Do not share this medicine with others.  What if I miss a dose?  If you miss a dose, take it as soon as you can. If it is almost time for your next dose, take only that dose. Do not take double or extra doses.  What may interact with this medicine?  -antacids  -barbiturates  -birth control pills  -bismuth subsalicylate  -carbamazepine  -methoxyflurane  -other  antibiotics  -phenytoin  -vitamins that contain iron  -warfarin  This list may not describe all possible interactions. Give your health care provider a list of all the medicines, herbs, non-prescription drugs, or dietary supplements you use. Also tell them if you smoke, drink alcohol, or use illegal drugs. Some items may interact with your medicine.  What should I watch for while using this medicine?  Tell your doctor or health care professional if your symptoms do not improve.  Do not treat diarrhea with over the counter products. Contact your doctor if you have diarrhea that lasts more than 2 days or if it is severe and watery.  Do not take this medicine just before going to bed. It may not dissolve properly when you lay down and can cause pain in your throat. Drink plenty of fluids while taking this medicine to also help reduce irritation in your throat.  This medicine can make you more sensitive to the sun. Keep out of the sun. If you cannot avoid being in the sun, wear protective clothing and use sunscreen. Do not use sun lamps or tanning beds/booths.  Birth control pills may not work properly while you are taking this medicine. Talk to your doctor about using an extra method of birth control.  If you are being treated for a sexually transmitted infection, avoid sexual contact until you have finished your treatment. Your sexual partner may also need treatment.  Avoid antacids, aluminum, calcium, magnesium, and iron products for 4 hours before and 2 hours after taking a dose of this medicine.  If you are using this medicine to prevent malaria, you should still protect yourself from contact with mosquitos. Stay in screened-in areas, use mosquito nets, keep your body covered, and use an insect repellent.  What side effects may I notice from receiving this medicine?  Side effects that you should report to your doctor or health care professional as soon as possible:  -allergic reactions like skin rash, itching or  hives, swelling of the face, lips, or tongue  -difficulty breathing  -fever  -itching in the rectal or genital area  -pain on swallowing  -redness, blistering, peeling or loosening of the skin, including inside the mouth  -severe stomach pain or cramps  -unusual bleeding or bruising  -unusually weak or tired  -yellowing of the eyes or skin  Side effects that usually do not require medical attention (report to your doctor or health care professional if they continue or are bothersome):  -diarrhea  -loss of appetite  -nausea, vomiting  This list may not describe all possible side effects. Call your doctor for medical advice about side effects. You may report side effects to FDA at 8-500-YWU-4260.  Where should I keep my medicine?  Keep out of the reach of children.  Store at room temperature, below 30 degrees C (86 degrees F). Protect from light. Keep container tightly closed. Throw away any unused medicine after the expiration date. Taking this medicine after the expiration date can make you seriously ill.  NOTE: This sheet is a summary. It may not cover all possible information. If you have questions about this medicine, talk to your doctor, pharmacist, or health care provider.  © 2014, [employer]. (4/7/2009 4:53:02 PM)  Basic Carbohydrate Counting for Diabetes Mellitus  Carbohydrate counting is a method for keeping track of the amount of carbohydrates you eat. Eating carbohydrates naturally increases the level of sugar (glucose) in your blood, so it is important for you to know the amount that is okay for you to have in every meal. Carbohydrate counting helps keep the level of glucose in your blood within normal limits. The amount of carbohydrates allowed is different for every person. A dietitian can help you calculate the amount that is right for you. Once you know the amount of carbohydrates you can have, you can count the carbohydrates in the foods you want to eat.  Carbohydrates are found in the  "following foods:  · Grains, such as breads and cereals.  · Dried beans and soy products.  · Starchy vegetables, such as potatoes, peas, and corn.  · Fruit and fruit juices.  · Milk and yogurt.  · Sweets and snack foods, such as cake, cookies, candy, chips, soft drinks, and fruit drinks.  CARBOHYDRATE COUNTING  There are two ways to count the carbohydrates in your food. You can use either of the methods or a combination of both.  Reading the \"Nutrition Facts\" on Packaged Food  The \"Nutrition Facts\" is an area that is included on the labels of almost all packaged food and beverages in the [place]. It includes the serving size of that food or beverage and information about the nutrients in each serving of the food, including the grams (g) of carbohydrate per serving.   Decide the number of servings of this food or beverage that you will be able to eat or drink. Multiply that number of servings by the number of grams of carbohydrate that is listed on the label for that serving. The total will be the amount of carbohydrates you will be having when you eat or drink this food or beverage.  Learning Standard Serving Sizes of Food  When you eat food that is not packaged or does not include \"Nutrition Facts\" on the label, you need to measure the servings in order to count the amount of carbohydrates. A serving of most carbohydrate-rich foods contains about 15 g of carbohydrates. The following list includes serving sizes of carbohydrate-rich foods that provide 15 g of carbohydrate per serving:    · 1 slice of bread (1 oz) or 1 six-inch tortilla.    · ½ of a hamburger bun or English muffin.  · 4-6 crackers.  · ¾ cup unsweetened dry cereal.    · ½ cup hot cereal.  ·  cup rice or pasta.    · ½ cup mashed potatoes or ¼ of a large baked potato.  · 1 cup fresh fruit or one small piece of fruit.    · ½ cup canned or frozen fruit or fruit juice.  · 1 cup milk.  ·  cup plain fat-free yogurt or yogurt sweetened with artificial " sweeteners.  · ½ cup cooked dried beans or starchy vegetable, such as peas, corn, or potatoes.    Decide the number of standard-size servings that you will eat. Multiply that number of servings by 15 (the grams of carbohydrates in that serving). For example, if you eat 2 cups of strawberries, you will have eaten 2 servings and 30 g of carbohydrates (2 servings x 15 g = 30 g). For foods such as soups and casseroles, in which more than one food is mixed in, you will need to count the carbohydrates in each food that is included.  EXAMPLE OF CARBOHYDRATE COUNTING  Sample Dinner   · 3 oz chicken breast.  ·  cup of brown rice.  · ½ cup of corn.  · 1 cup milk.    · 1 cup strawberries with sugar-free whipped topping.    Carbohydrate Calculation   Step 1: Identify the foods that contain carbohydrates:   · Rice.    · Corn.    · Milk.    · Strawberries.  Step 2: Calculate the number of servings eaten of each:   · 2 servings of rice.    · 1 serving of corn.    · 1 serving of milk.    · 1 serving of strawberries.  Step 3:  Multiply each of those number of servings by 15 g:   · 2 servings of rice x 15 g = 30 g.    · 1 serving of corn x 15 g = 15 g.    · 1 serving of milk x 15 g = 15 g.    · 1 serving of strawberries x 15 g = 15 g.  Step 4: Add together all of the amounts to find the total grams of carbohydrates eaten: 30 g + 15 g + 15 g + 15 g = 75 g.     This information is not intended to replace advice given to you by your health care provider. Make sure you discuss any questions you have with your health care provider.     Document Released: 12/18/2006 Document Revised: 01/08/2016 Document Reviewed: 11/14/2014  Elsevier Interactive Patient Education ©2016 Elsevier Inc.    · Is patient discharged on Warfarin / Coumadin?   No     · Is patient Post Blood Transfusion?  No    Depression / Suicide Risk    As you are discharged from this [facility] facility, it is important to learn how to keep safe from harming  yourself.    Recognize the warning signs:  · Abrupt changes in personality, positive or negative- including increase in energy   · Giving away possessions  · Change in eating patterns- significant weight changes-  positive or negative  · Change in sleeping patterns- unable to sleep or sleeping all the time   · Unwillingness or inability to communicate  · Depression  · Unusual sadness, discouragement and loneliness  · Talk of wanting to die  · Neglect of personal appearance   · Rebelliousness- reckless behavior  · Withdrawal from people/activities they love  · Confusion- inability to concentrate     If you or a loved one observes any of these behaviors or has concerns about self-harm, here's what you can do:  · Talk about it- your feelings and reasons for harming yourself  · Remove any means that you might use to hurt yourself (examples: pills, rope, extension cords, firearm)  · Get professional help from the community (Mental Health, Substance Abuse, psychological counseling)  · Do not be alone:Call your Safe Contact- someone whom you trust who will be there for you.  · Call your local CRISIS HOTLINE 208-3453 or 089-752-9032  · Call your local Children's Mobile Crisis Response Team [place] (948) 653-8309 or www.InflaRx  · Call the toll free National Suicide Prevention Hotlines   · National Suicide Prevention Lifeline 314-111-TAFZ (5248)  · National Hope Line Network 800-SUICIDE (986-3943)

## 2017-11-25 NOTE — FLOWSHEET NOTE
Patient used our hospital CPAP last night, took it off early this morning.  Patient continues to wheeze but it is improved compared to yesterday.     11/25/17 0724   Interdisciplinary Plan of Care-Goals (Indications)   Obstructive Ventilatory Defect or Pulmonary Disease without Obvious Obstruction Physical Exam / Hyperinflation / Wheezing (bronchospasm)   RT Assessment of Delivered Medications   Evaluation of Medication Delivery Daily Yes-- Pt /Family has been Instructed in use of Respiratory Medications and Adverse Reactions   SVN Group   #SVN Performed Yes   Given By: Mouthpiece   Chest Exam   Respiration 16   Pulse 68   Breath Sounds   RUL Breath Sounds Expiratory Wheezes   RML Breath Sounds Expiratory Wheezes   RLL Breath Sounds Diminished;Expiratory Wheezes   AYDEN Breath Sounds Expiratory Wheezes   LLL Breath Sounds Diminished;Expiratory Wheezes   Secretions   Cough Strong;Congested;Non Productive   How Sputum Obtained Spontaneous   Oxygen   Home O2 Use Prior To Admission? No   Pulse Oximetry 94 %   O2 (LPM) 0   O2 Daily Delivery Respiratory  Room Air with O2 Available

## 2017-11-25 NOTE — FLOWSHEET NOTE
11/24/17 0308   Events/Summary/Plan   Events/Summary/Plan Pt was placed on home cpap unit at this time, Pt tolerated well with no respiratory distress noted at this time.   CPAP/BiPAP [facility]   Nocturnal CPAP or BiPAP CPAP   Home Unit Used? No   Equipment Inspected for Cleanliness, Operation, and Safety? Yes   Settings (If Known) 6-15   FiO2 or LPM 2   Home Mask Used? Yes   Respiratory WDL   Respiratory (WDL) X   Chest Exam   Work Of Breathing / Effort Mild   Breath Sounds   Pre/Post Intervention Post Intervention Assessment   RUL Breath Sounds Expiratory Wheezes   RML Breath Sounds Expiratory Wheezes   RLL Breath Sounds Expiratory Wheezes   AYDEN Breath Sounds Expiratory Wheezes   LLL Breath Sounds Expiratory Wheezes

## 2017-11-26 LAB
BACTERIA SPEC RESP CULT: NORMAL
GRAM STN SPEC: NORMAL
SIGNIFICANT IND 70042: NORMAL
SITE SITE: NORMAL
SOURCE SOURCE: NORMAL

## 2017-11-26 NOTE — DISCHARGE SUMMARY
CHIEF COMPLAINT ON ADMISSION  Chief Complaint   Patient presents with   • Shortness of Breath     seen yesterday   • Cough     6 days       CODE STATUS  Prior    HPI & HOSPITAL COURSE  53 YO morbidly obese non smoker w/ h/o Sleep apnea and depression presented w/ wheezing and subjective fever. She was found to have bronchopneumonia on CT. She is mildly hypoxic on RA (91%). She is noted to have Glucose of 180 on Chemistry panel but carries no h/o DM. Subsequent A1c was 6.3. This is borderline for diagnosis of diabetes and this was discussed with her. She was planning to go to a medical weight loss Center and change her diet anyways before she became sick, she relates that at times she has wheezing with exertion and sometimes at night even prior to this illness and will follow-up with her pulmonologist.     Overnight with IV steroids and nebulized bronchodilators and empiric antibiotic therapy she improved significantly. Her oxygenation also improved and she was satting in the low to mid 90s on room air.    Therefore, she is discharged in good and stable condition with close outpatient follow-up.    SPECIFIC OUTPATIENT FOLLOW-UP  Primary care  Pulmonary    DISCHARGE PROBLEM LIST  Active Problems:    Recurrent major depressive disorder, in partial remission (CMS-HCC) POA: Yes    Bronchopneumonia, unspecified organism POA: Yes    Asthma POA: Yes    BMI 45.0-49.9, adult (CMS-HCC)- 45.7 POA: Yes    DM (diabetes mellitus) (CMS-HCC) POA: Yes    Acute respiratory failure with hypoxia (CMS-HCC) POA: Yes  Resolved Problems:    * No resolved hospital problems. *      FOLLOW UP  Future Appointments  Date Time Provider Department Center   11/28/2017 11:00 AM [facility]   11/30/2017 7:15 AM [facility]   11/30/2017 8:45 AM [facility] [place] [facility]     Pcp Pt States None            MEDICATIONS ON DISCHARGE   Latia Almanza   Home Medication Instructions BOSSMAN:[id number]     Printed on:11/26/17 1790   Medication Information                      albuterol 108 (90 Base) MCG/ACT Aero Soln inhalation aerosol  Inhale 2 Puffs by mouth every 6 hours as needed for Shortness of Breath.             doxycycline (MONODOX) 100 MG capsule  Take 1 Cap by mouth 2 times a day.             escitalopram (LEXAPRO) 20 MG tablet  Take 1 Tab by mouth every day.             estradiol (VIVELLE DOT) 0.05 MG/24HR PATCH BIWEEKLY  APPLY ONE PATCH TO CLEAN, DRY SKIN AS DIRECTED TWO TIMES PER WEEK. REMOVE OLD PATCH BEFORE APPLYING NEW.             fluticasone-salmeterol (ADVAIR DISKUS) 250-50 MCG/DOSE AEROSOL POWDER, BREATH ACTIVATED  Inhale 1 Puff by mouth every 12 hours.             guaifenesin LA 1200 MG TABLET SR 12 HR  Take 1 Tab by mouth every 12 hours.             guaifenesin-codeine (CHERATUSSIN AC) Solution oral solution  Take 5 mL by mouth every 6 hours as needed.             methylPREDNISolone (MEDROL) 4 MG Tab  1 tab 4 times a day for 3 days; 1 tab 3 times a day for 3 days; 1 tab 2 times a day for 3 days; 1 tab daily for 3 days                 DIET  Low carbohydrate    ACTIVITY  As tolerated    CONSULTATIONS  None    PROCEDURES  None    LABORATORY  Lab Results   Component Value Date/Time    SODIUM 136 11/25/2017 04:18 AM    POTASSIUM 4.3 11/25/2017 04:18 AM    CHLORIDE 103 11/25/2017 04:18 AM    CO2 24 11/25/2017 04:18 AM    GLUCOSE 173 (H) 11/25/2017 04:18 AM    BUN 16 11/25/2017 04:18 AM    CREATININE 0.97 11/25/2017 04:18 AM        Lab Results   Component Value Date/Time    WBC 8.6 11/25/2017 04:18 AM    HEMOGLOBIN 12.4 11/25/2017 04:18 AM    HEMATOCRIT 37.5 11/25/2017 04:18 AM    PLATELETCT 311 11/25/2017 04:18 AM        Total time of the discharge process exceeds 38 minutes

## 2017-11-27 ENCOUNTER — OFFICE VISIT (OUTPATIENT)
Dept: PULMONOLOGY | Facility: HOSPICE | Age: 52
End: 2017-11-27
Payer: COMMERCIAL

## 2017-11-27 VITALS
WEIGHT: 293 LBS | OXYGEN SATURATION: 91 % | HEART RATE: 80 BPM | SYSTOLIC BLOOD PRESSURE: 130 MMHG | BODY MASS INDEX: 41.95 KG/M2 | TEMPERATURE: 97.6 F | HEIGHT: 70 IN | RESPIRATION RATE: 17 BRPM | DIASTOLIC BLOOD PRESSURE: 72 MMHG

## 2017-11-27 DIAGNOSIS — J45.901 EXACERBATION OF PERSISTENT ASTHMA, UNSPECIFIED ASTHMA SEVERITY: ICD-10-CM

## 2017-11-27 DIAGNOSIS — D86.0 SARCOIDOSIS OF LUNG (HCC): ICD-10-CM

## 2017-11-27 PROCEDURE — 99214 OFFICE O/P EST MOD 30 MIN: CPT | Performed by: INTERNAL MEDICINE

## 2017-11-27 RX ORDER — ALBUTEROL SULFATE 2.5 MG/3ML
2.5 SOLUTION RESPIRATORY (INHALATION) EVERY 4 HOURS PRN
Qty: 75 ML | Refills: 3 | Status: SHIPPED
Start: 2017-11-27 | End: 2018-12-10

## 2017-11-27 RX ORDER — OMEPRAZOLE 20 MG/1
20 CAPSULE, DELAYED RELEASE ORAL DAILY
Qty: 30 CAP | Refills: 2 | Status: ON HOLD | OUTPATIENT
Start: 2017-11-27 | End: 2018-05-09

## 2017-11-27 RX ORDER — PREDNISONE 20 MG/1
TABLET ORAL
Qty: 43 TAB | Refills: 0 | Status: ON HOLD | OUTPATIENT
Start: 2017-11-27 | End: 2018-02-01

## 2017-11-27 NOTE — LETTER
November 27, 2017         Patient: Latia Almanza   YOB: 1965   Date of Visit: 11/27/2017           To Whom it May Concern:    Latia Almanza was seen in my clinic on 11/27/2017. She may return to work on 12/11/17.    If you have any questions or concerns, please don't hesitate to call.        Sincerely,     Lance Latif M.D.  Electronically Signed

## 2017-11-27 NOTE — PROGRESS NOTES
Latia Almanza is a 52 y.o. female here for recent hospitalization.    History of Present Illness:    The patient is 52-year-old female who was recently admitted to the hospital for pneumonia. She comes in for follow-up. She had previously seen Dr. Dye for her sleep apnea and is on a CPAP device currently at an AutoPap pressure between 6 and 15 cm of water. She receives an average pressure of about 11-12 cm of water. She has a history of sarcoid and this was diagnosed 2005 by transbronchial biopsy. She was treated with prednisone for 6 months. She has not required any further treatment of a since that time. However the patient says that her separate symptom seems to have recurred over the last several months. She's had more coughing and some increased shortness of breath and this is similar to her symptoms in 2005. When she was in the hospital data CT scan of the chest was negative for pulmonary emboli. It did demonstrate some perihilar nodular infiltrates mostly in the right lower lobe. This may have been an infectious process. She was treated with antibiotics but she was also given IV steroids. Since she was discharged from the hospital she has continued with the low-dose Medrol taper and doxycycline. She is using Advair 250/50. She still coughing and having wheezing. She has been evaluated for bariatric surgery. She has diabetes. When she was in the hospital they were checking her blood sugars but there was no discharge plan to have her blood sugars checked or have her take a sliding scale insulin.    Constitutional:  Negative for fever, chills, sweats, and fatigue.  Eyes:  Negative for eye pain and visual changes.  HENT:  Negative for tinnitus and hoarse voice.  Cardiovascular:  Negative for chest pain, leg swelling, syncope and orthopnea.  Respiratory:  See HPI for pertinent negatives  Sleep:  Negative for somnolence, loud snoring, sleep disturbance due to breathing, insomnia.  Gastrointestinal:  Negative  for dysphagia, nausea and abdominal pain.  Heme/lymph:  Denies easy bruising, blood clots.  Musculoskeletal:  Negative for arthralgias, sore muscles and back pain.  Skin:  Negative for rash and color change.  Neurological:  Negative for headaches, lightheadedness and weakness.  Psychiatric:  Denies depression.    Current Outpatient Prescriptions   Medication Sig Dispense Refill   • predniSONE (DELTASONE) 20 MG Tab Take 3 tablets daily for 3 days then 2 tablets daily for 7 days and then 1 tablet daily 43 Tab 0   • omeprazole (PRILOSEC) 20 MG delayed-release capsule Take 1 Cap by mouth every day. 30 Cap 2   • guaifenesin LA 1200 MG TABLET SR 12 HR Take 1 Tab by mouth every 12 hours. 28 Tab    • methylPREDNISolone (MEDROL) 4 MG Tab 1 tab 4 times a day for 3 days; 1 tab 3 times a day for 3 days; 1 tab 2 times a day for 3 days; 1 tab daily for 3 days 30 Tab 0   • fluticasone-salmeterol (ADVAIR DISKUS) 250-50 MCG/DOSE AEROSOL POWDER, BREATH ACTIVATED Inhale 1 Puff by mouth every 12 hours. 1 Inhaler 12   • doxycycline (MONODOX) 100 MG capsule Take 1 Cap by mouth 2 times a day. 20 Cap 0   • guaifenesin-codeine (CHERATUSSIN AC) Solution oral solution Take 5 mL by mouth every 6 hours as needed. 120 mL 0   • albuterol 108 (90 Base) MCG/ACT Aero Soln inhalation aerosol Inhale 2 Puffs by mouth every 6 hours as needed for Shortness of Breath. 8.5 g 0   • escitalopram (LEXAPRO) 20 MG tablet Take 1 Tab by mouth every day. 30 Tab 11   • estradiol (VIVELLE DOT) 0.05 MG/24HR PATCH BIWEEKLY APPLY ONE PATCH TO CLEAN, DRY SKIN AS DIRECTED TWO TIMES PER WEEK. REMOVE OLD PATCH BEFORE APPLYING NEW. 8 Patch 0     No current facility-administered medications for this visit.        Social History   Substance Use Topics   • Smoking status: Former Smoker     Packs/day: 0.25     Years: 5.00     Types: Cigarettes     Quit date: 1/20/2001   • Smokeless tobacco: Never Used   • Alcohol use 1.2 oz/week     2 Cans of beer per week      Comment: 4 per  "week       Past Medical History:   Diagnosis Date   • Allergic rhinitis    • Anxiety    • Back pain     back/ right hip   • Bronchitis 5/2016   • Dental disorder     upper implants and bridge   • Obesity    • Pneumonia 5/2016   • Restless leg syndrome    • Sarcoidosis (CMS-HCC)    • Sciatica    • Sleep apnea     CPAP   • Snoring        Past Surgical History:   Procedure Laterality Date   • LUMBAR FUSION ANTERIOR  10/27/2016    Procedure: LUMBAR FUSION ANTERIOR L5-S1 ALIF;  Surgeon: Ronal Washington III, M.D.;  Location: SURGERY [facility];  Service:    • ABDOMINAL HYSTERECTOMY TOTAL     • ATHROPLASTY     • HIP ARTHROPLASTY TOTAL     • HIP REPLACEMENT, TOTAL     • HYSTERECTOMY LAPAROSCOPY     • LAPAROTOMY     • OTHER      dental implants   • OTHER ORTHOPEDIC SURGERY      R hip replacement March 2008   • SINUSCOPE         Allergies:  Patient has no known allergies.    Family History   Problem Relation Age of Onset   • Cancer Mother    • Lung Disease Mother    • Diabetes Mother    • Diabetes Father    • Heart Disease Father        Physical Examination    Vitals:    11/27/17 0849   Height: 1.778 m (5' 10\")   Weight: (!) 146.1 kg (322 lb)   Weight % change since last entry.: 0 %   BP: 130/72   Pulse: 80   BMI (Calculated): 46.2   Resp: 17   Temp: 36.4 °C (97.6 °F)   O2 sat % room air: 91 %       Physical Exam:  Constitutional:  Well developed and well nourished.  Head:  Normocephalic and atraumatic.  Nose:  Nose normal.  Mouth/Throat:  Oropharynx is clear and moist, no lesions.    Neck:  Normal range of motion.  Supple.  No JVD.  Cardiovascular:  Normal rate, regular rhythm, normal heart sounds. No edema  Pulmonary/Chest: No wheezing, rales or rhonchi.  Respiratory effort non labored  Musculoskeletal.  No muscular atrophy.  Lymphadenopathy:  No cervical or supraclavicular adenopathy  Neurological:  Alert and oriented.  Cranial nerves intact.  No focal deficits  Skin:  No rashes or ulcers.  Psyciatric:  Normal mood and " affect.    Assessment and Plan:  1. Sarcoidosis of lung (CMS-HCC)  I suspect the patient is having active sarcoidosis. The CT scan of the chest shows classic perihilar nodular type infiltrate. She has a known history of sarcoid. She has completed a course of antibiotics and therefore feel that she's been adequately treated for infection. Certainly atypical infection is a possibility but with her known history of sarcoid I believe this is most likely diagnosis. I decided to increase her prednisone to 60 mg daily for 3 days and then down to 40 mg for 7 days and then down to 20 mg daily and then reevaluate in 2 weeks. We'll see her in 2 weeks. We will give her a note for work. I believe she would benefit from an albuterol nebulizer device which we will arrange. We will also arrange for diabetic education as well as blood sugar monitoring at home and sliding scale insulin. I also prescribed omeprazole that she should take while she is on the prednisone.    2. Exacerbation of persistent asthma, unspecified asthma severity  See above      I recommend follow-up in 2 weeks    Followup in 2 weeks

## 2017-11-29 ENCOUNTER — TELEPHONE (OUTPATIENT)
Dept: PULMONOLOGY | Facility: HOSPICE | Age: 52
End: 2017-11-29

## 2017-11-29 NOTE — TELEPHONE ENCOUNTER
----- Message from Lance Latif M.D. sent at 11/27/2017  4:10 PM PST -----  Regarding: RE: Appt  thanks  ----- Message -----  From: Karmen Wasserman, Med Ass't  Sent: 11/27/2017   2:14 PM  To: Lance Latif M.D.  Subject: Appt                                             Just letting you know I got her an appointment tomorrow at 11:20am with Endo. Pt aware.

## 2017-11-30 ENCOUNTER — TELEPHONE (OUTPATIENT)
Dept: SLEEP MEDICINE | Facility: MEDICAL CENTER | Age: 52
End: 2017-11-30

## 2017-11-30 ENCOUNTER — APPOINTMENT (OUTPATIENT)
Dept: RADIOLOGY | Facility: MEDICAL CENTER | Age: 52
End: 2017-11-30
Attending: INTERNAL MEDICINE
Payer: COMMERCIAL

## 2017-11-30 NOTE — TELEPHONE ENCOUNTER
Received VM at sleep center from pt in regards to her nebulizer order, she states she still does not have it but she does have the medication for it. I LVM for her stating that the order goes through a DME and was sent to DME:  [facility] / ph [phone] / fax [phone] and provider her with the number to follow up with them, advised her to call pulmonary back if there are any problems.

## 2017-12-01 ENCOUNTER — TELEPHONE (OUTPATIENT)
Dept: PULMONOLOGY | Facility: HOSPICE | Age: 52
End: 2017-12-01

## 2017-12-01 NOTE — TELEPHONE ENCOUNTER
Please get further details on symptoms. Prednisone can cause joint pain when tapering off if it happens too quickly. If the patient is on a consistent dose it would actually improve her knee and hip pain as it is an anti-inflammatory. Please check on disability paperwork.

## 2017-12-04 ENCOUNTER — TELEPHONE (OUTPATIENT)
Dept: PULMONOLOGY | Facility: HOSPICE | Age: 52
End: 2017-12-04

## 2017-12-04 NOTE — TELEPHONE ENCOUNTER
The patient called and stated that she turned in disability paperwork to be filled out on 11/29.  She called to see if it is done yet.  I checked with Karmen since she is a Dr. Latif patient and she does not know anything about the paperwork.  I am trying to check with Verónica at the sleep center, but no one answers the phone there.  I will keep trying the sleep center and will call the pt back when I find out where the paperwork is.

## 2017-12-05 ENCOUNTER — TELEPHONE (OUTPATIENT)
Dept: PULMONOLOGY | Facility: HOSPICE | Age: 52
End: 2017-12-05

## 2017-12-05 NOTE — TELEPHONE ENCOUNTER
I asked Verónica if she had the paperwork for Latia and she stated that the name sounded familiar, but she does not remember anything about the paperwork.  I called the patient and she was not home.  I left a [facility] for her to please return my call.

## 2017-12-05 NOTE — TELEPHONE ENCOUNTER
"I called the patient to advise that her paperwork needs to be completed by an MD and that she will be seeing Dr. Mujica and not Harper Tamayo on [id number]. She wants her paperwork to be completed now since she recently saw Dr. Latif. I explained that her paperwork was not received until 5:28PM on the 30th and Dr. Latif is no longer available to sign this paperwork and she needs to be seen by a physician that can attest to her disability. She demanded a sooner appointment since \"this is an oversight on our part\" per the patient and not her fault that we are just getting the paperwork completed. I explained that we currently do not have an opening for her to be seen sooner and that Dr. Latif  noted that she was not due back until the 11th of December for a follow up. She said she was not getting paid to be off right now and wants this done now. I said I would put her on the cancellation list and call her as soon as there is an opening and she hung up on me.  "

## 2017-12-05 NOTE — TELEPHONE ENCOUNTER
The patient called me back and she emailed me the disability paperwork.  I left the pt a ashley mssg to find out if she dropped off the paperwork here or at the sleep center on the 29th.

## 2017-12-12 ENCOUNTER — APPOINTMENT (OUTPATIENT)
Dept: PULMONOLOGY | Facility: HOSPICE | Age: 52
End: 2017-12-12
Payer: COMMERCIAL

## 2017-12-12 NOTE — TELEPHONE ENCOUNTER
The patient emailed the paperwork to me.  She has an appt with Dr. Bella on 12/12 to get it filled out.

## 2017-12-13 ENCOUNTER — OFFICE VISIT (OUTPATIENT)
Dept: PULMONOLOGY | Facility: HOSPICE | Age: 52
End: 2017-12-13
Payer: COMMERCIAL

## 2017-12-13 VITALS
BODY MASS INDEX: 41.95 KG/M2 | DIASTOLIC BLOOD PRESSURE: 80 MMHG | HEART RATE: 76 BPM | OXYGEN SATURATION: 95 % | RESPIRATION RATE: 15 BRPM | SYSTOLIC BLOOD PRESSURE: 132 MMHG | WEIGHT: 293 LBS | TEMPERATURE: 98.1 F | HEIGHT: 70 IN

## 2017-12-13 DIAGNOSIS — J45.20 MILD INTERMITTENT ASTHMA WITHOUT COMPLICATION: ICD-10-CM

## 2017-12-13 DIAGNOSIS — D86.9 SARCOID: ICD-10-CM

## 2017-12-13 PROCEDURE — 99214 OFFICE O/P EST MOD 30 MIN: CPT | Performed by: INTERNAL MEDICINE

## 2017-12-13 RX ORDER — PREDNISONE 10 MG/1
TABLET ORAL
Qty: 30 TAB | Refills: 2 | Status: ON HOLD | OUTPATIENT
Start: 2017-12-13 | End: 2018-02-01

## 2017-12-13 NOTE — PATIENT INSTRUCTIONS
This lady had hospitalization, sarcoidosis flare, is on tapering prednisone. She is down to 20 mg a day, still has dyspnea on exertion. Room air saturations are good. She would like to stay off work for another 2 weeks and I wrote a note to endorse this for her ongoing respiratory problems. She also has some reactive airways, utilizes inhalers, is faithful about their use. We will have her taper the prednisone down to 10 mg a day, extended for another 30 days, prescription submitted, and then see her back once that is completed to make certain she is back to her baseline state. In the meantime we are making arrangements for her to see a sarcoid specialist at [facility], referral to Dr. Shila PETERSEN. I've also recommended that she see her [facility], for follow-up as she has known sarcoid, and needs to be checked periodically. She is making arrangements for that in the near future.    I did fill out paperwork for short-term disability, I met her today for the first time but we can retrieve prior records from her hospitalization and prior pulmonary visits if she needs additional documentation for those intervals. She will need to return in 2 weeks for an office visit so that we can be certain she is safe to go back to work, given the fact that we have placed her on exercise restrictions and I would not be comfortable releasing her without reassessment. She may benefit from an exercise oximetry when she comes in at the next visit if we need further documentation of functional status, or if her disability is to be further prolonged period

## 2017-12-13 NOTE — PROGRESS NOTES
Latia Almanza is a 52 y.o. female here for sarcoidosis on prednisone with reactive airway disease. Patient was referred by her primary care doctor.    History of Present Illness:      This lady had hospitalization, sarcoidosis flare, is on tapering prednisone. She is down to 20 mg a day, still has dyspnea on exertion. Room air saturations are good. She would like to stay off work for another 2 weeks and I wrote a note to endorse this for her ongoing respiratory problems. She also has some reactive airways, utilizes inhalers, is faithful about their use. We will have her taper the prednisone down to 10 mg a day, extended for another 30 days, prescription submitted, and then see her back once that is completed to make certain she is back to her baseline state. In the meantime we are making arrangements for her to see a sarcoid specialist at [facility], referral to Dr. Shila PETERSEN. I've also recommended that she see [facility], for follow-up as she has known sarcoid, and needs to be checked periodically. She is making arrangements for that in the near future.    I did fill out paperwork for short-term disability, I met her today for the first time but we can retrieve prior records from her hospitalization and prior pulmonary visits if she needs additional documentation for those intervals. She will need to return in 2 weeks for an office visit so that we can be certain she is safe to go back to work, given the fact that we have placed her on exercise restrictions and I would not be comfortable releasing her without reassessment. She may benefit from an exercise oximetry when she comes in at the next visit if we need further documentation of functional status, or if her disability is to be further prolonged period    Constitutional ROS: No unexpected change in weight, No unexplained fevers  Eyes: No change in vision or blurring or double vision  Mouth/Throat ROS: No sore throat, No recent change in voice or  hoarseness  Pulmonary ROS: See present history for pertinent positives  Cardiovascular ROS: No chest pain to suggest acute coronary syndrome  Gastrointestinal ROS: No abdominal pain to suggest peptic disease  Musculoskeletal/Extremities ROS: no acute artritis or unusual swelling  Hematologic/Lymphatic ROS: No easy bleeding or unusual lymph node swelling  Neurologic ROS: No new or unusual weakness  Psychiatric ROS: No hallucinations  Allergic/Immunologic: No  urticaria or allergic rash      Current Outpatient Prescriptions   Medication Sig Dispense Refill   • predniSONE (DELTASONE) 10 MG Tab Take 30mg x 5 days, then take 20mg x 5 days, then take 10mg x 5 days, with food, then discontinue. 30 Tab 2   • predniSONE (DELTASONE) 20 MG Tab Take 3 tablets daily for 3 days then 2 tablets daily for 7 days and then 1 tablet daily 43 Tab 0   • omeprazole (PRILOSEC) 20 MG delayed-release capsule Take 1 Cap by mouth every day. 30 Cap 2   • albuterol (PROVENTIL) 2.5mg/3ml Nebu Soln solution for nebulization 3 mL by Nebulization route every four hours as needed for Shortness of Breath. 75 mL 3   • guaifenesin LA 1200 MG TABLET SR 12 HR Take 1 Tab by mouth every 12 hours. 28 Tab    • methylPREDNISolone (MEDROL) 4 MG Tab 1 tab 4 times a day for 3 days; 1 tab 3 times a day for 3 days; 1 tab 2 times a day for 3 days; 1 tab daily for 3 days 30 Tab 0   • fluticasone-salmeterol (ADVAIR DISKUS) 250-50 MCG/DOSE AEROSOL POWDER, BREATH ACTIVATED Inhale 1 Puff by mouth every 12 hours. 1 Inhaler 12   • doxycycline (MONODOX) 100 MG capsule Take 1 Cap by mouth 2 times a day. 20 Cap 0   • guaifenesin-codeine (CHERATUSSIN AC) Solution oral solution Take 5 mL by mouth every 6 hours as needed. 120 mL 0   • albuterol 108 (90 Base) MCG/ACT Aero Soln inhalation aerosol Inhale 2 Puffs by mouth every 6 hours as needed for Shortness of Breath. 8.5 g 0   • escitalopram (LEXAPRO) 20 MG tablet Take 1 Tab by mouth every day. 30 Tab 11   • estradiol (VIVELLE  "DOT) 0.05 MG/24HR PATCH BIWEEKLY APPLY ONE PATCH TO CLEAN, DRY SKIN AS DIRECTED TWO TIMES PER WEEK. REMOVE OLD PATCH BEFORE APPLYING NEW. 8 Patch 0     No current facility-administered medications for this visit.        Social History   Substance Use Topics   • Smoking status: Former Smoker     Packs/day: 0.25     Years: 5.00     Types: Cigarettes     Quit date: 1/20/2001   • Smokeless tobacco: Never Used   • Alcohol use 1.2 oz/week     2 Cans of beer per week      Comment: 4 per week        Past Medical History:   Diagnosis Date   • Allergic rhinitis    • Anxiety    • Back pain     back/ right hip   • Bronchitis 5/2016   • Dental disorder     upper implants and bridge   • Obesity    • Pneumonia 5/2016   • Restless leg syndrome    • Sarcoidosis (CMS-HCC)    • Sciatica    • Sleep apnea     CPAP   • Snoring        Past Surgical History:   Procedure Laterality Date   • LUMBAR FUSION ANTERIOR  10/27/2016    Procedure: LUMBAR FUSION ANTERIOR L5-S1 ALIF;  Surgeon: Ronal Washington III, M.D.;  Location: SURGERY [facility];  Service:    • ABDOMINAL HYSTERECTOMY TOTAL     • ATHROPLASTY     • HIP ARTHROPLASTY TOTAL     • HIP REPLACEMENT, TOTAL     • HYSTERECTOMY LAPAROSCOPY     • LAPAROTOMY     • OTHER      dental implants   • OTHER ORTHOPEDIC SURGERY      R hip replacement March 2008   • SINUSCOPE         Allergies: Patient has no known allergies.    Family History   Problem Relation Age of Onset   • Cancer Mother    • Lung Disease Mother    • Diabetes Mother    • Diabetes Father    • Heart Disease Father        Physical Examination    Vitals:    12/13/17 1123   Height: 1.778 m (5' 10\")   Weight: (!) 146.1 kg (322 lb)   Weight % change since last entry.: 0 %   BP: 132/80   Pulse: 76   BMI (Calculated): 46.2   Resp: 15   Temp: 36.7 °C (98.1 °F)   O2 sat % room air: 95 %       General Appearance: alert, no distress  Skin: Skin color, texture, turgor normal. No rashes or lesions.  Eyes: negative  Oropharynx: Lips, mucosa, " and tongue normal. Teeth and gums normal. Oropharynx moist and without lesion  Lungs: positive findings: Expiratory wheezes with forced maneuver otherwise clear  Heart: negative. RRR without murmur, gallop, or rubs.  No ectopy.  Abdomen: Abdomen soft, non-tender. . No masses,  No organomegaly  Extremities:  No deformities, edema, or skin discoloration  Joints: No acute arthritis  Peripheral Pulses:perfused  Neurologic: intact grossly  Abdominal protuberance without tenderness    I (soft palate, uvula, fauces, tonsillar pillars visible)    Imaging: None presently    PFTS: None presently      Assessment and Plan  1. Mild intermittent asthma without complication    2. Sarcoid (CMS-HCC)  Previously on high-dose sarcoid prednisone dosing, now tapering slowly  - REFERRAL TO OTHER    This lady had hospitalization, sarcoidosis flare, is on tapering prednisone. She is down to 20 mg a day, still has dyspnea on exertion. Room air saturations are good. She would like to stay off work for another 2 weeks and I wrote a note to endorse this for her ongoing respiratory problems. She also has some reactive airways, utilizes inhalers, is faithful about their use. We will have her taper the prednisone down to 10 mg a day, extended for another 30 days, prescription submitted, and then see her back once that is completed to make certain she is back to her baseline state. In the meantime we are making arrangements for her to see a sarcoid specialist at [facility], referral to Dr. Shila PETERSEN. I've also recommended that she see her [facility], for follow-up as she has known sarcoid, and needs to be checked periodically. She is making arrangements for that in the near future.  Followup Return in about 6 weeks (around 1/24/2018) for follow up visit with pulmonary physician.

## 2017-12-14 ENCOUNTER — TELEPHONE (OUTPATIENT)
Dept: PULMONOLOGY | Facility: HOSPICE | Age: 52
End: 2017-12-14

## 2017-12-15 ENCOUNTER — TELEPHONE (OUTPATIENT)
Dept: SLEEP MEDICINE | Facility: MEDICAL CENTER | Age: 52
End: 2017-12-15

## 2017-12-15 DIAGNOSIS — G47.33 OSA (OBSTRUCTIVE SLEEP APNEA): ICD-10-CM

## 2017-12-15 NOTE — TELEPHONE ENCOUNTER
Pt called and would like an order sent to [facility] for a mask fitting so she can pick out a nasal mask. Please sign order if this is ok.  Will fax once complete. Thanks!

## 2017-12-15 NOTE — TELEPHONE ENCOUNTER
Latia had called about her Disability paperwork. I let her know they were on Dr. Mujica's desk, and I would fax them as soon as they were signed. Patient asked that they also be faxed to her at [phone].   I faxed all completed paper work to [employer]w and to the Patient.        Please fax all clinical information after each visit or patient contact to [phone]. Any and all charges for copies are the responsibility of patient.

## 2018-01-09 ENCOUNTER — APPOINTMENT (OUTPATIENT)
Dept: PULMONOLOGY | Facility: HOSPICE | Age: 53
End: 2018-01-09
Payer: COMMERCIAL

## 2018-01-09 DIAGNOSIS — R09.02 HYPOXIA: ICD-10-CM

## 2018-01-22 ENCOUNTER — TELEPHONE (OUTPATIENT)
Dept: PULMONOLOGY | Facility: HOSPICE | Age: 53
End: 2018-01-22

## 2018-01-24 ENCOUNTER — OFFICE VISIT (OUTPATIENT)
Dept: PULMONOLOGY | Facility: HOSPICE | Age: 53
End: 2018-01-24
Payer: COMMERCIAL

## 2018-01-24 ENCOUNTER — NON-PROVIDER VISIT (OUTPATIENT)
Dept: PULMONOLOGY | Facility: HOSPICE | Age: 53
End: 2018-01-24
Payer: COMMERCIAL

## 2018-01-24 VITALS
BODY MASS INDEX: 41.95 KG/M2 | SYSTOLIC BLOOD PRESSURE: 130 MMHG | RESPIRATION RATE: 16 BRPM | HEIGHT: 70 IN | DIASTOLIC BLOOD PRESSURE: 90 MMHG | HEART RATE: 84 BPM | OXYGEN SATURATION: 98 % | TEMPERATURE: 98.3 F | WEIGHT: 293 LBS

## 2018-01-24 DIAGNOSIS — G47.33 OBSTRUCTIVE SLEEP APNEA SYNDROME: ICD-10-CM

## 2018-01-24 DIAGNOSIS — D86.9 SARCOID: ICD-10-CM

## 2018-01-24 DIAGNOSIS — J45.20 MILD INTERMITTENT ASTHMA WITHOUT COMPLICATION: ICD-10-CM

## 2018-01-24 DIAGNOSIS — R06.02 SOB (SHORTNESS OF BREATH): ICD-10-CM

## 2018-01-24 PROCEDURE — 94726 PLETHYSMOGRAPHY LUNG VOLUMES: CPT | Performed by: INTERNAL MEDICINE

## 2018-01-24 PROCEDURE — 90471 IMMUNIZATION ADMIN: CPT | Performed by: INTERNAL MEDICINE

## 2018-01-24 PROCEDURE — 99214 OFFICE O/P EST MOD 30 MIN: CPT | Mod: 25 | Performed by: INTERNAL MEDICINE

## 2018-01-24 PROCEDURE — 90686 IIV4 VACC NO PRSV 0.5 ML IM: CPT | Performed by: INTERNAL MEDICINE

## 2018-01-24 PROCEDURE — 94060 EVALUATION OF WHEEZING: CPT | Performed by: INTERNAL MEDICINE

## 2018-01-24 PROCEDURE — 94729 DIFFUSING CAPACITY: CPT | Performed by: INTERNAL MEDICINE

## 2018-01-24 PROCEDURE — 90670 PCV13 VACCINE IM: CPT | Performed by: INTERNAL MEDICINE

## 2018-01-24 PROCEDURE — 90472 IMMUNIZATION ADMIN EACH ADD: CPT | Performed by: INTERNAL MEDICINE

## 2018-01-24 RX ORDER — BLOOD SUGAR DIAGNOSTIC
STRIP MISCELLANEOUS
Status: ON HOLD | COMMUNITY
Start: 2017-11-28 | End: 2018-05-09

## 2018-01-24 RX ORDER — DOXYCYCLINE HYCLATE 100 MG/1
CAPSULE ORAL
COMMUNITY
Start: 2017-11-25 | End: 2018-01-28

## 2018-01-24 RX ORDER — LIRAGLUTIDE 6 MG/ML
INJECTION SUBCUTANEOUS
COMMUNITY
Start: 2017-11-28 | End: 2018-10-12

## 2018-01-24 RX ORDER — PEN NEEDLE, DIABETIC 32GX 5/32"
NEEDLE, DISPOSABLE MISCELLANEOUS
Status: ON HOLD | COMMUNITY
Start: 2017-11-28 | End: 2018-05-09

## 2018-01-24 ASSESSMENT — PULMONARY FUNCTION TESTS
FEV1_PREDICTED: 3.32
FEV1_LLN: 2.77
FEV1_PERCENT_PREDICTED: 71
FEV1/FVC_PERCENT_LLN: 66
FVC_LLN: 3.53
FEV1/FVC: 84
FVC: 2.83
FVC_PERCENT_PREDICTED: 66
FEV1/FVC: 83
FEV1/FVC_PERCENT_PREDICTED: 108
FEV1: 2.36
FEV1/FVC_PREDICTED: 79
FVC_PREDICTED: 4.23
FEV1/FVC_PERCENT_PREDICTED: 105
FEV1/FVC_PERCENT_PREDICTED: 78

## 2018-01-24 NOTE — PROCEDURES
Good patient effort & cooperation.  The results of this test meet the ATS standards for acceptability and repeatability.  A bronchodilator of Ventolin HFA- 2puffs via spacer was administered.    Lung function testing completed January 24, 2018 shows normal mid flows at 94% predicted, 2.82 L/s. Mild reduction of FEV1 of 2.36 L, 71% predicted. FEV1 FVC ratio is 105% predicted. Total lung capacity is normal. Marked reduction of expiratory reserve volume with elevation of BMI at 46.2. Oxygen transfer normal. Flow volume loop confirms a borderline obstructive and restrictive process, with good effort noted

## 2018-01-24 NOTE — PROGRESS NOTES
Latia Almanza is a 52 y.o. female here for reactive airway disease, sarcoidosis with flare on prednisone recently, medically significant excessive weight and reduced exercise tolerance. Patient was referred by her primary care doctor.    History of Present Illness:      This lady has sarcoidosis, had a flare and was placed on high-dose prednisone. She appears to have responded, now down to 10 mg per day, and is been able to return to work at least part-time. She still has significant dyspnea on exertion and I suspect this is multi-factorial with mild reactive airway disease, medically significant excessive weight with a BMI at 46.2, as well as the sarcoidosis.    It is encouraging that lung function testing completed today shows normal oxygen transfer, total lung capacity at 96% of predicted, mid flows are normal. Mild reduction of forced vital capacity is noted. Marked reduction of expiratory reserve volume reflects her elevated body weight.    She will stay on prednisone 10 mg a day unless the [place] pulmonary referral office suggests she taper, in which case I would suggest alternate day and slow taper. In addition she will continue the Advair and albuterol rescue inhaler. High resolution CAT scan is ordered stat so that she can carry the results with her tomorrow. I previously ordered this one week ago, but we will try to facilitate this to be done more rapidly.    She is returned to work, still working part-time, short-term disability paperwork today and previously filled out. Await [place] recommendations, and recheck in 2 months sooner for problems    Constitutional ROS: No unexpected change in weight, No unexplained fevers  Eyes: No change in vision or blurring or double vision  Mouth/Throat ROS: No sore throat, No recent change in voice or hoarseness  Pulmonary ROS: See present history for pertinent positives  Cardiovascular ROS: No chest pain to suggest acute coronary  syndrome  Gastrointestinal ROS: No abdominal pain to suggest peptic disease  Musculoskeletal/Extremities ROS: no acute artritis or unusual swelling  Hematologic/Lymphatic ROS: No easy bleeding or unusual lymph node swelling  Neurologic ROS: No new or unusual weakness  Psychiatric ROS: No hallucinations  Allergic/Immunologic: No  urticaria or allergic rash      Current Outpatient Prescriptions   Medication Sig Dispense Refill   • VICTOZA 18 MG/3ML Solution Pen-injector injection      • predniSONE (DELTASONE) 10 MG Tab Take 30mg x 5 days, then take 20mg x 5 days, then take 10mg x 5 days, with food, then discontinue. 30 Tab 2   • albuterol (PROVENTIL) 2.5mg/3ml Nebu Soln solution for nebulization 3 mL by Nebulization route every four hours as needed for Shortness of Breath. 75 mL 3   • fluticasone-salmeterol (ADVAIR DISKUS) 250-50 MCG/DOSE AEROSOL POWDER, BREATH ACTIVATED Inhale 1 Puff by mouth every 12 hours. 1 Inhaler 12   • albuterol 108 (90 Base) MCG/ACT Aero Soln inhalation aerosol Inhale 2 Puffs by mouth every 6 hours as needed for Shortness of Breath. 8.5 g 0   • escitalopram (LEXAPRO) 20 MG tablet Take 1 Tab by mouth every day. 30 Tab 11   • estradiol (VIVELLE DOT) 0.05 MG/24HR PATCH BIWEEKLY APPLY ONE PATCH TO CLEAN, DRY SKIN AS DIRECTED TWO TIMES PER WEEK. REMOVE OLD PATCH BEFORE APPLYING NEW. 8 Patch 0   • doxycycline (VIBRAMYCIN) 100 MG Cap      • ONETOUCH VERIO strip      • BD PEN NEEDLE ALIZA U/F 32G X 4 MM Misc      • predniSONE (DELTASONE) 20 MG Tab Take 3 tablets daily for 3 days then 2 tablets daily for 7 days and then 1 tablet daily 43 Tab 0   • omeprazole (PRILOSEC) 20 MG delayed-release capsule Take 1 Cap by mouth every day. 30 Cap 2   • guaifenesin LA 1200 MG TABLET SR 12 HR Take 1 Tab by mouth every 12 hours. 28 Tab    • methylPREDNISolone (MEDROL) 4 MG Tab 1 tab 4 times a day for 3 days; 1 tab 3 times a day for 3 days; 1 tab 2 times a day for 3 days; 1 tab daily for 3 days 30 Tab 0   •  "doxycycline (MONODOX) 100 MG capsule Take 1 Cap by mouth 2 times a day. 20 Cap 0   • guaifenesin-codeine (CHERATUSSIN AC) Solution oral solution Take 5 mL by mouth every 6 hours as needed. 120 mL 0     No current facility-administered medications for this visit.        Social History   Substance Use Topics   • Smoking status: Former Smoker     Packs/day: 0.25     Years: 5.00     Types: Cigarettes     Quit date: 1/20/2001   • Smokeless tobacco: Never Used   • Alcohol use 1.2 oz/week     2 Cans of beer per week      Comment: 4 per week        Past Medical History:   Diagnosis Date   • Allergic rhinitis    • Anxiety    • Back pain     back/ right hip   • Bronchitis 5/2016   • Dental disorder     upper implants and bridge   • Obesity    • Pneumonia 5/2016   • Restless leg syndrome    • Sarcoidosis (CMS-HCC)    • Sciatica    • Sleep apnea     CPAP   • Snoring        Past Surgical History:   Procedure Laterality Date   • LUMBAR FUSION ANTERIOR  10/27/2016    Procedure: LUMBAR FUSION ANTERIOR L5-S1 ALIF;  Surgeon: Ronal Washington III, M.D.;  Location: SURGERY [facility];  Service:    • ABDOMINAL HYSTERECTOMY TOTAL     • ATHROPLASTY     • HIP ARTHROPLASTY TOTAL     • HIP REPLACEMENT, TOTAL     • HYSTERECTOMY LAPAROSCOPY     • LAPAROTOMY     • OTHER      dental implants   • OTHER ORTHOPEDIC SURGERY      R hip replacement March 2008   • SINUSCOPE         Allergies: Patient has no known allergies.    Family History   Problem Relation Age of Onset   • Cancer Mother    • Lung Disease Mother    • Diabetes Mother    • Diabetes Father    • Heart Disease Father        Physical Examination    Vitals:    01/24/18 1123   Height: 1.778 m (5' 10\")   Weight: (!) 146.1 kg (322 lb)   Weight % change since last entry.: 0 %   BP: 130/90   Pulse: 84   BMI (Calculated): 46.2   Resp: 16   Temp: 36.8 °C (98.3 °F)       General Appearance: alert, no distress  Skin: Skin color, texture, turgor normal. No rashes or lesions.  Eyes: " negative  Oropharynx: Lips, mucosa, and tongue normal. Teeth and gums normal. Oropharynx moist and without lesion  Lungs: positive findings: Remarkably clear without wheezing  Heart: negative. RRR without murmur, gallop, or rubs.  No ectopy.  Abdomen: Abdomen soft, non-tender. . No masses,  No organomegaly  Extremities:  No deformities, edema, or skin discoloration  Joints: No acute arthritis  Peripheral Pulses:perfused  Neurologic: intact grossly  No unusual adenopathy cervical or supraclavicular    II (soft palate, uvula, fauces visible)    Imaging: Described above    PFTS: Described above      Assessment and Plan  1. [place]  - [place]-CHEST, HIGH RESOLUTION LUNG; Future  - INFLUENZA VACCINE QUAD INJ >3Y(PF)  - PNEUMOCOCCAL CONJUGATE VACCINE 13-VALENT    2. Obstructive sleep apnea syndrome    3. BMI 45.0-49.9, adult (CMS-HCC)- 45.7    4. Mild intermittent asthma without complication      FollJoshua zaidi has sarcoidosis, had a flare and was placed on high-dose prednisone. She appears to have responded, now down to 10 mg per day, and is been able to return to work at least part-time. She still has significant dyspnea on exertion and I suspect this is multi-factorial with mild reactive airway disease, medically significant excessive weight with a BMI at 46.2, as well as the sarcoidosis.    It is encouraging that lung function testing completed today shows normal oxygen transfer, total lung capacity at 96% of predicted, mid flows are normal. Mild reduction of forced vital capacity is noted. Marked reduction of expiratory reserve volume reflects her elevated body weight.    She will stay on prednisone 10 mg a day unless the [place] pulmonary referral office suggests she taper, in which case I would suggest alternate day and slow taper. In addition she will continue the Advair and albuterol rescue inhaler. High resolution CAT scan is ordered stat so that she can carry the results with her tomorrow. I  previously ordered this one week ago, but we will try to facilitate this to be done more rapidly.    She is returned to work, still working part-time, short-term disability paperwork today and previously filled out. Await [place] recommendations, and recheck in 2 months sooner for problemsowup Return in about 2 months (around 3/24/2018) for follow up visit with pulmonary physician.

## 2018-01-24 NOTE — PATIENT INSTRUCTIONS
This lady has sarcoidosis, had a flare and was placed on high-dose prednisone. She appears to have responded, now down to 10 mg per day, and is been able to return to work at least part-time. She still has significant dyspnea on exertion and I suspect this is multi-factorial with mild reactive airway disease, medically significant excessive weight with a BMI at 46.2, as well as the sarcoidosis.    It is encouraging that lung function testing completed today shows normal oxygen transfer, total lung capacity at 96% of predicted, mid flows are normal. Mild reduction of forced vital capacity is noted. Marked reduction of expiratory reserve volume reflects her elevated body weight.    She will stay on prednisone 10 mg a day unless the [place] pulmonary referral office suggests she taper, in which case I would suggest alternate day and slow taper. In addition she will continue the Advair and albuterol rescue inhaler. High resolution CAT scan is ordered stat so that she can carry the results with her tomorrow. I previously ordered this one week ago, but we will try to facilitate this to be done more rapidly.    She is returned to work, still working part-time, short-term disability paperwork today and previously filled out. Await [place] recommendations, and recheck in 2 months sooner for problems

## 2018-01-26 ENCOUNTER — APPOINTMENT (OUTPATIENT)
Dept: RADIOLOGY | Facility: MEDICAL CENTER | Age: 53
End: 2018-01-26
Attending: INTERNAL MEDICINE
Payer: COMMERCIAL

## 2018-01-28 ENCOUNTER — RESOLUTE PROFESSIONAL BILLING HOSPITAL PROF FEE (OUTPATIENT)
Dept: HOSPITALIST | Facility: MEDICAL CENTER | Age: 53
End: 2018-01-28
Payer: COMMERCIAL

## 2018-01-28 ENCOUNTER — APPOINTMENT (OUTPATIENT)
Dept: RADIOLOGY | Facility: MEDICAL CENTER | Age: 53
DRG: 193 | End: 2018-01-28
Attending: EMERGENCY MEDICINE
Payer: COMMERCIAL

## 2018-01-28 ENCOUNTER — HOSPITAL ENCOUNTER (INPATIENT)
Facility: MEDICAL CENTER | Age: 53
LOS: 3 days | DRG: 193 | End: 2018-02-01
Attending: EMERGENCY MEDICINE | Admitting: HOSPITALIST
Payer: COMMERCIAL

## 2018-01-28 DIAGNOSIS — G47.33 OBSTRUCTIVE SLEEP APNEA SYNDROME: ICD-10-CM

## 2018-01-28 DIAGNOSIS — R06.03 ACUTE RESPIRATORY DISTRESS: ICD-10-CM

## 2018-01-28 DIAGNOSIS — J45.21 MILD INTERMITTENT ASTHMA WITH EXACERBATION: ICD-10-CM

## 2018-01-28 DIAGNOSIS — J96.01 ACUTE RESPIRATORY FAILURE WITH HYPOXIA (HCC): ICD-10-CM

## 2018-01-28 PROBLEM — R06.02 SOB (SHORTNESS OF BREATH): Status: ACTIVE | Noted: 2018-01-28

## 2018-01-28 LAB
ALBUMIN SERPL BCP-MCNC: 3.8 G/DL (ref 3.2–4.9)
ALBUMIN/GLOB SERPL: 1.4 G/DL
ALP SERPL-CCNC: 107 U/L (ref 30–99)
ALT SERPL-CCNC: 135 U/L (ref 2–50)
ANION GAP SERPL CALC-SCNC: 10 MMOL/L (ref 0–11.9)
AST SERPL-CCNC: 81 U/L (ref 12–45)
BASOPHILS # BLD AUTO: 0.7 % (ref 0–1.8)
BASOPHILS # BLD: 0.03 K/UL (ref 0–0.12)
BILIRUB SERPL-MCNC: 0.7 MG/DL (ref 0.1–1.5)
BUN SERPL-MCNC: 8 MG/DL (ref 8–22)
CALCIUM SERPL-MCNC: 9 MG/DL (ref 8.4–10.2)
CHLORIDE SERPL-SCNC: 104 MMOL/L (ref 96–112)
CO2 SERPL-SCNC: 26 MMOL/L (ref 20–33)
CREAT SERPL-MCNC: 1.07 MG/DL (ref 0.5–1.4)
EOSINOPHIL # BLD AUTO: 0.04 K/UL (ref 0–0.51)
EOSINOPHIL NFR BLD: 0.9 % (ref 0–6.9)
ERYTHROCYTE [DISTWIDTH] IN BLOOD BY AUTOMATED COUNT: 54.5 FL (ref 35.9–50)
FLUAV+FLUBV AG SPEC QL IA: NORMAL
GLOBULIN SER CALC-MCNC: 2.8 G/DL (ref 1.9–3.5)
GLUCOSE SERPL-MCNC: 111 MG/DL (ref 65–99)
HCT VFR BLD AUTO: 37.2 % (ref 37–47)
HGB BLD-MCNC: 12.5 G/DL (ref 12–16)
IMM GRANULOCYTES # BLD AUTO: 0.01 K/UL (ref 0–0.11)
IMM GRANULOCYTES NFR BLD AUTO: 0.2 % (ref 0–0.9)
LACTATE BLD-SCNC: 1.56 MMOL/L (ref 0.5–2)
LACTATE BLD-SCNC: 2.2 MMOL/L (ref 0.5–2)
LYMPHOCYTES # BLD AUTO: 0.55 K/UL (ref 1–4.8)
LYMPHOCYTES NFR BLD: 12.5 % (ref 22–41)
MCH RBC QN AUTO: 30 PG (ref 27–33)
MCHC RBC AUTO-ENTMCNC: 33.6 G/DL (ref 33.6–35)
MCV RBC AUTO: 89.4 FL (ref 81.4–97.8)
MONOCYTES # BLD AUTO: 0.39 K/UL (ref 0–0.85)
MONOCYTES NFR BLD AUTO: 8.9 % (ref 0–13.4)
NEUTROPHILS # BLD AUTO: 3.38 K/UL (ref 2–7.15)
NEUTROPHILS NFR BLD: 76.8 % (ref 44–72)
NRBC # BLD AUTO: 0 K/UL
NRBC BLD-RTO: 0 /100 WBC
PLATELET # BLD AUTO: 242 K/UL (ref 164–446)
PMV BLD AUTO: 9.3 FL (ref 9–12.9)
POTASSIUM SERPL-SCNC: 3.4 MMOL/L (ref 3.6–5.5)
PROT SERPL-MCNC: 6.6 G/DL (ref 6–8.2)
RBC # BLD AUTO: 4.16 M/UL (ref 4.2–5.4)
SIGNIFICANT IND 70042: NORMAL
SITE SITE: NORMAL
SODIUM SERPL-SCNC: 140 MMOL/L (ref 135–145)
SOURCE SOURCE: NORMAL
SPECIMEN DRAWN FROM PATIENT: ABNORMAL
SPECIMEN DRAWN FROM PATIENT: NORMAL
WBC # BLD AUTO: 4.4 K/UL (ref 4.8–10.8)

## 2018-01-28 PROCEDURE — 85025 COMPLETE CBC W/AUTO DIFF WBC: CPT

## 2018-01-28 PROCEDURE — 87502 INFLUENZA DNA AMP PROBE: CPT

## 2018-01-28 PROCEDURE — 80053 COMPREHEN METABOLIC PANEL: CPT

## 2018-01-28 PROCEDURE — G0378 HOSPITAL OBSERVATION PER HR: HCPCS

## 2018-01-28 PROCEDURE — 87400 INFLUENZA A/B EACH AG IA: CPT

## 2018-01-28 PROCEDURE — A9270 NON-COVERED ITEM OR SERVICE: HCPCS | Performed by: EMERGENCY MEDICINE

## 2018-01-28 PROCEDURE — 700105 HCHG RX REV CODE 258: Performed by: EMERGENCY MEDICINE

## 2018-01-28 PROCEDURE — 99285 EMERGENCY DEPT VISIT HI MDM: CPT

## 2018-01-28 PROCEDURE — 83605 ASSAY OF LACTIC ACID: CPT | Mod: 91

## 2018-01-28 PROCEDURE — 36415 COLL VENOUS BLD VENIPUNCTURE: CPT

## 2018-01-28 PROCEDURE — 94660 CPAP INITIATION&MGMT: CPT

## 2018-01-28 PROCEDURE — A9270 NON-COVERED ITEM OR SERVICE: HCPCS

## 2018-01-28 PROCEDURE — 87040 BLOOD CULTURE FOR BACTERIA: CPT

## 2018-01-28 PROCEDURE — 700102 HCHG RX REV CODE 250 W/ 637 OVERRIDE(OP): Performed by: EMERGENCY MEDICINE

## 2018-01-28 PROCEDURE — 71045 X-RAY EXAM CHEST 1 VIEW: CPT

## 2018-01-28 PROCEDURE — 94640 AIRWAY INHALATION TREATMENT: CPT

## 2018-01-28 PROCEDURE — 94760 N-INVAS EAR/PLS OXIMETRY 1: CPT

## 2018-01-28 PROCEDURE — 700101 HCHG RX REV CODE 250: Performed by: EMERGENCY MEDICINE

## 2018-01-28 PROCEDURE — 700102 HCHG RX REV CODE 250 W/ 637 OVERRIDE(OP)

## 2018-01-28 PROCEDURE — 99220 PR INITIAL OBSERVATION CARE,LEVL III: CPT | Performed by: HOSPITALIST

## 2018-01-28 PROCEDURE — 700101 HCHG RX REV CODE 250: Performed by: HOSPITALIST

## 2018-01-28 RX ORDER — ALBUTEROL SULFATE 90 UG/1
2 AEROSOL, METERED RESPIRATORY (INHALATION) EVERY 6 HOURS PRN
Status: DISCONTINUED | OUTPATIENT
Start: 2018-01-28 | End: 2018-01-28

## 2018-01-28 RX ORDER — AMOXICILLIN 250 MG
2 CAPSULE ORAL 2 TIMES DAILY
Status: DISCONTINUED | OUTPATIENT
Start: 2018-01-28 | End: 2018-02-01 | Stop reason: HOSPADM

## 2018-01-28 RX ORDER — PROMETHAZINE HYDROCHLORIDE 25 MG/1
12.5-25 TABLET ORAL EVERY 4 HOURS PRN
Status: DISCONTINUED | OUTPATIENT
Start: 2018-01-28 | End: 2018-02-01 | Stop reason: HOSPADM

## 2018-01-28 RX ORDER — OMEPRAZOLE 20 MG/1
20 CAPSULE, DELAYED RELEASE ORAL DAILY
Status: DISCONTINUED | OUTPATIENT
Start: 2018-01-29 | End: 2018-02-01 | Stop reason: HOSPADM

## 2018-01-28 RX ORDER — LORAZEPAM 2 MG/ML
0.5 INJECTION INTRAMUSCULAR EVERY 6 HOURS PRN
Status: DISCONTINUED | OUTPATIENT
Start: 2018-01-28 | End: 2018-02-01 | Stop reason: HOSPADM

## 2018-01-28 RX ORDER — SODIUM CHLORIDE AND POTASSIUM CHLORIDE 150; 900 MG/100ML; MG/100ML
INJECTION, SOLUTION INTRAVENOUS CONTINUOUS
Status: DISCONTINUED | OUTPATIENT
Start: 2018-01-28 | End: 2018-01-30

## 2018-01-28 RX ORDER — ACETAMINOPHEN 325 MG/1
650 TABLET ORAL ONCE
Status: COMPLETED | OUTPATIENT
Start: 2018-01-28 | End: 2018-01-28

## 2018-01-28 RX ORDER — ONDANSETRON 2 MG/ML
4 INJECTION INTRAMUSCULAR; INTRAVENOUS EVERY 4 HOURS PRN
Status: DISCONTINUED | OUTPATIENT
Start: 2018-01-28 | End: 2018-02-01 | Stop reason: HOSPADM

## 2018-01-28 RX ORDER — ONDANSETRON 4 MG/1
4 TABLET, ORALLY DISINTEGRATING ORAL EVERY 4 HOURS PRN
Status: DISCONTINUED | OUTPATIENT
Start: 2018-01-28 | End: 2018-02-01 | Stop reason: HOSPADM

## 2018-01-28 RX ORDER — PROMETHAZINE HYDROCHLORIDE 25 MG/1
12.5-25 SUPPOSITORY RECTAL EVERY 4 HOURS PRN
Status: DISCONTINUED | OUTPATIENT
Start: 2018-01-28 | End: 2018-02-01 | Stop reason: HOSPADM

## 2018-01-28 RX ORDER — ACETAMINOPHEN 325 MG/1
650 TABLET ORAL EVERY 6 HOURS PRN
Status: DISCONTINUED | OUTPATIENT
Start: 2018-01-28 | End: 2018-02-01 | Stop reason: HOSPADM

## 2018-01-28 RX ORDER — POLYETHYLENE GLYCOL 3350 17 G/17G
1 POWDER, FOR SOLUTION ORAL
Status: DISCONTINUED | OUTPATIENT
Start: 2018-01-28 | End: 2018-02-01 | Stop reason: HOSPADM

## 2018-01-28 RX ORDER — DEXTROSE MONOHYDRATE 25 G/50ML
25 INJECTION, SOLUTION INTRAVENOUS
Status: DISCONTINUED | OUTPATIENT
Start: 2018-01-28 | End: 2018-02-01 | Stop reason: HOSPADM

## 2018-01-28 RX ORDER — PREDNISONE 20 MG/1
20 TABLET ORAL DAILY
Status: DISCONTINUED | OUTPATIENT
Start: 2018-01-29 | End: 2018-02-01 | Stop reason: HOSPADM

## 2018-01-28 RX ORDER — ESCITALOPRAM OXALATE 10 MG/1
20 TABLET ORAL
Status: DISCONTINUED | OUTPATIENT
Start: 2018-01-29 | End: 2018-02-01 | Stop reason: HOSPADM

## 2018-01-28 RX ORDER — LORAZEPAM 1 MG/1
0.5 TABLET ORAL EVERY 6 HOURS PRN
Status: DISCONTINUED | OUTPATIENT
Start: 2018-01-28 | End: 2018-02-01 | Stop reason: HOSPADM

## 2018-01-28 RX ORDER — BUDESONIDE AND FORMOTEROL FUMARATE DIHYDRATE 160; 4.5 UG/1; UG/1
2 AEROSOL RESPIRATORY (INHALATION)
Status: DISCONTINUED | OUTPATIENT
Start: 2018-01-28 | End: 2018-01-29 | Stop reason: ALTCHOICE

## 2018-01-28 RX ORDER — SODIUM CHLORIDE 9 MG/ML
INJECTION, SOLUTION INTRAVENOUS CONTINUOUS
Status: DISCONTINUED | OUTPATIENT
Start: 2018-01-28 | End: 2018-01-28

## 2018-01-28 RX ORDER — BISACODYL 10 MG
10 SUPPOSITORY, RECTAL RECTAL
Status: DISCONTINUED | OUTPATIENT
Start: 2018-01-28 | End: 2018-02-01 | Stop reason: HOSPADM

## 2018-01-28 RX ADMIN — SODIUM CHLORIDE 1000 ML: 9 INJECTION, SOLUTION INTRAVENOUS at 20:32

## 2018-01-28 RX ADMIN — ACETAMINOPHEN 650 MG: 325 TABLET, FILM COATED ORAL at 21:00

## 2018-01-28 RX ADMIN — POTASSIUM CHLORIDE AND SODIUM CHLORIDE: 900; 150 INJECTION, SOLUTION INTRAVENOUS at 22:13

## 2018-01-28 RX ADMIN — ALBUTEROL SULFATE 2.5 MG: 2.5 SOLUTION RESPIRATORY (INHALATION) at 20:10

## 2018-01-28 RX ADMIN — ALBUTEROL SULFATE 2.5 MG: 2.5 SOLUTION RESPIRATORY (INHALATION) at 22:56

## 2018-01-28 RX ADMIN — BUDESONIDE AND FORMOTEROL FUMARATE DIHYDRATE 2 PUFF: 160; 4.5 AEROSOL RESPIRATORY (INHALATION) at 22:41

## 2018-01-28 ASSESSMENT — LIFESTYLE VARIABLES
EVER HAD A DRINK FIRST THING IN THE MORNING TO STEADY YOUR NERVES TO GET RID OF A HANGOVER: NO
HAVE PEOPLE ANNOYED YOU BY CRITICIZING YOUR DRINKING: NO
CONSUMPTION TOTAL: NEGATIVE
HAVE YOU EVER FELT YOU SHOULD CUT DOWN ON YOUR DRINKING: NO
ALCOHOL_USE: YES
EVER FELT BAD OR GUILTY ABOUT YOUR DRINKING: NO
TOTAL SCORE: 0
AVERAGE NUMBER OF DAYS PER WEEK YOU HAVE A DRINK CONTAINING ALCOHOL: 2
TOTAL SCORE: 0
EVER_SMOKED: YES
TOTAL SCORE: 0
HOW MANY TIMES IN THE PAST YEAR HAVE YOU HAD 5 OR MORE DRINKS IN A DAY: 0
ON A TYPICAL DAY WHEN YOU DRINK ALCOHOL HOW MANY DRINKS DO YOU HAVE: 3

## 2018-01-28 ASSESSMENT — PATIENT HEALTH QUESTIONNAIRE - PHQ9
SUM OF ALL RESPONSES TO PHQ QUESTIONS 1-9: 0
SUM OF ALL RESPONSES TO PHQ9 QUESTIONS 1 AND 2: 0
1. LITTLE INTEREST OR PLEASURE IN DOING THINGS: NOT AT ALL
2. FEELING DOWN, DEPRESSED, IRRITABLE, OR HOPELESS: NOT AT ALL

## 2018-01-28 ASSESSMENT — ENCOUNTER SYMPTOMS
NERVOUS/ANXIOUS: 0
BACK PAIN: 0
ABDOMINAL PAIN: 0
MYALGIAS: 0
LOSS OF CONSCIOUSNESS: 0
HEMOPTYSIS: 0
DEPRESSION: 0
NAUSEA: 0
FLANK PAIN: 0
WEIGHT LOSS: 0
MUSCULOSKELETAL NEGATIVE: 1
COUGH: 1
SPUTUM PRODUCTION: 0
DIZZINESS: 0
CHILLS: 0
FEVER: 0
BRUISES/BLEEDS EASILY: 0
GASTROINTESTINAL NEGATIVE: 1
WEAKNESS: 0
VOMITING: 0
CARDIOVASCULAR NEGATIVE: 1
PSYCHIATRIC NEGATIVE: 1
NEUROLOGICAL NEGATIVE: 1
WHEEZING: 1
CONSTITUTIONAL NEGATIVE: 1
SHORTNESS OF BREATH: 1

## 2018-01-28 ASSESSMENT — COPD QUESTIONNAIRES
DO YOU EVER COUGH UP ANY MUCUS OR PHLEGM?: NO/ONLY WITH OCCASIONAL COLDS OR INFECTIONS
HAVE YOU SMOKED AT LEAST 100 CIGARETTES IN YOUR ENTIRE LIFE: YES
DURING THE PAST 4 WEEKS HOW MUCH DID YOU FEEL SHORT OF BREATH: MOST  OR ALL OF THE TIME
COPD SCREENING SCORE: 6

## 2018-01-28 ASSESSMENT — PAIN SCALES - GENERAL: PAINLEVEL_OUTOF10: 0

## 2018-01-29 ENCOUNTER — APPOINTMENT (OUTPATIENT)
Dept: RADIOLOGY | Facility: MEDICAL CENTER | Age: 53
DRG: 193 | End: 2018-01-29
Attending: INTERNAL MEDICINE
Payer: COMMERCIAL

## 2018-01-29 PROBLEM — J11.1 INFLUENZA: Status: ACTIVE | Noted: 2018-01-29

## 2018-01-29 PROBLEM — R06.02 SOB (SHORTNESS OF BREATH): Status: ACTIVE | Noted: 2018-01-29

## 2018-01-29 PROBLEM — R74.01 TRANSAMINITIS: Status: ACTIVE | Noted: 2018-01-29

## 2018-01-29 PROBLEM — J45.901 ASTHMA EXACERBATION: Status: ACTIVE | Noted: 2017-11-24

## 2018-01-29 PROBLEM — J10.1 INFLUENZA A: Status: ACTIVE | Noted: 2018-01-29

## 2018-01-29 PROBLEM — D72.819 LEUKOPENIA: Status: ACTIVE | Noted: 2018-01-29

## 2018-01-29 PROBLEM — E87.6 HYPOKALEMIA: Status: ACTIVE | Noted: 2018-01-29

## 2018-01-29 LAB
ALBUMIN SERPL BCP-MCNC: 3.4 G/DL (ref 3.2–4.9)
ALBUMIN/GLOB SERPL: 1.4 G/DL
ALP SERPL-CCNC: 82 U/L (ref 30–99)
ALT SERPL-CCNC: 111 U/L (ref 2–50)
ANION GAP SERPL CALC-SCNC: 10 MMOL/L (ref 0–11.9)
APPEARANCE UR: ABNORMAL
AST SERPL-CCNC: 60 U/L (ref 12–45)
BACTERIA #/AREA URNS HPF: ABNORMAL /HPF
BASOPHILS # BLD AUTO: 0.6 % (ref 0–1.8)
BASOPHILS # BLD: 0.02 K/UL (ref 0–0.12)
BILIRUB SERPL-MCNC: 0.6 MG/DL (ref 0.1–1.5)
BILIRUB UR QL STRIP.AUTO: NEGATIVE
BUN SERPL-MCNC: 5 MG/DL (ref 8–22)
CALCIUM SERPL-MCNC: 8.3 MG/DL (ref 8.4–10.2)
CHLORIDE SERPL-SCNC: 107 MMOL/L (ref 96–112)
CO2 SERPL-SCNC: 24 MMOL/L (ref 20–33)
COLOR UR: YELLOW
CREAT SERPL-MCNC: 0.95 MG/DL (ref 0.5–1.4)
EOSINOPHIL # BLD AUTO: 0.02 K/UL (ref 0–0.51)
EOSINOPHIL NFR BLD: 0.6 % (ref 0–6.9)
EPI CELLS #/AREA URNS HPF: ABNORMAL /HPF
ERYTHROCYTE [DISTWIDTH] IN BLOOD BY AUTOMATED COUNT: 57.1 FL (ref 35.9–50)
FLUAV RNA SPEC QL NAA+PROBE: POSITIVE
FLUBV RNA SPEC QL NAA+PROBE: NEGATIVE
GLOBULIN SER CALC-MCNC: 2.5 G/DL (ref 1.9–3.5)
GLUCOSE BLD-MCNC: 103 MG/DL (ref 65–99)
GLUCOSE BLD-MCNC: 105 MG/DL (ref 65–99)
GLUCOSE BLD-MCNC: 119 MG/DL (ref 65–99)
GLUCOSE BLD-MCNC: 84 MG/DL (ref 65–99)
GLUCOSE SERPL-MCNC: 110 MG/DL (ref 65–99)
GLUCOSE UR STRIP.AUTO-MCNC: NEGATIVE MG/DL
HAV IGM SERPL QL IA: NEGATIVE
HBV CORE IGM SER QL: NEGATIVE
HBV SURFACE AG SER QL: NEGATIVE
HCT VFR BLD AUTO: 35.5 % (ref 37–47)
HCV AB SER QL: NEGATIVE
HGB BLD-MCNC: 11.4 G/DL (ref 12–16)
IMM GRANULOCYTES # BLD AUTO: 0.01 K/UL (ref 0–0.11)
IMM GRANULOCYTES NFR BLD AUTO: 0.3 % (ref 0–0.9)
KETONES UR STRIP.AUTO-MCNC: 15 MG/DL
LEUKOCYTE ESTERASE UR QL STRIP.AUTO: NEGATIVE
LYMPHOCYTES # BLD AUTO: 0.84 K/UL (ref 1–4.8)
LYMPHOCYTES NFR BLD: 24.9 % (ref 22–41)
MCH RBC QN AUTO: 29.5 PG (ref 27–33)
MCHC RBC AUTO-ENTMCNC: 32.1 G/DL (ref 33.6–35)
MCV RBC AUTO: 91.7 FL (ref 81.4–97.8)
MICRO URNS: ABNORMAL
MONOCYTES # BLD AUTO: 0.34 K/UL (ref 0–0.85)
MONOCYTES NFR BLD AUTO: 10.1 % (ref 0–13.4)
MUCOUS THREADS #/AREA URNS HPF: ABNORMAL /HPF
NEUTROPHILS # BLD AUTO: 2.14 K/UL (ref 2–7.15)
NEUTROPHILS NFR BLD: 63.5 % (ref 44–72)
NITRITE UR QL STRIP.AUTO: NEGATIVE
NRBC # BLD AUTO: 0 K/UL
NRBC BLD-RTO: 0 /100 WBC
PH UR STRIP.AUTO: 6 [PH]
PLATELET # BLD AUTO: 210 K/UL (ref 164–446)
PMV BLD AUTO: 9.8 FL (ref 9–12.9)
POTASSIUM SERPL-SCNC: 3.3 MMOL/L (ref 3.6–5.5)
PROT SERPL-MCNC: 5.9 G/DL (ref 6–8.2)
PROT UR QL STRIP: NEGATIVE MG/DL
RBC # BLD AUTO: 3.87 M/UL (ref 4.2–5.4)
RBC # URNS HPF: ABNORMAL /HPF
RBC UR QL AUTO: ABNORMAL
SODIUM SERPL-SCNC: 141 MMOL/L (ref 135–145)
SP GR UR STRIP.AUTO: 1.01
WBC # BLD AUTO: 3.4 K/UL (ref 4.8–10.8)
WBC #/AREA URNS HPF: ABNORMAL /HPF

## 2018-01-29 PROCEDURE — 80053 COMPREHEN METABOLIC PANEL: CPT

## 2018-01-29 PROCEDURE — 700102 HCHG RX REV CODE 250 W/ 637 OVERRIDE(OP): Performed by: HOSPITALIST

## 2018-01-29 PROCEDURE — 81001 URINALYSIS AUTO W/SCOPE: CPT

## 2018-01-29 PROCEDURE — 99233 SBSQ HOSP IP/OBS HIGH 50: CPT | Performed by: INTERNAL MEDICINE

## 2018-01-29 PROCEDURE — 80074 ACUTE HEPATITIS PANEL: CPT

## 2018-01-29 PROCEDURE — 76705 ECHO EXAM OF ABDOMEN: CPT

## 2018-01-29 PROCEDURE — 85025 COMPLETE CBC W/AUTO DIFF WBC: CPT

## 2018-01-29 PROCEDURE — 87086 URINE CULTURE/COLONY COUNT: CPT

## 2018-01-29 PROCEDURE — 36415 COLL VENOUS BLD VENIPUNCTURE: CPT

## 2018-01-29 PROCEDURE — 94760 N-INVAS EAR/PLS OXIMETRY 1: CPT

## 2018-01-29 PROCEDURE — 700102 HCHG RX REV CODE 250 W/ 637 OVERRIDE(OP): Performed by: INTERNAL MEDICINE

## 2018-01-29 PROCEDURE — 700101 HCHG RX REV CODE 250: Performed by: HOSPITALIST

## 2018-01-29 PROCEDURE — 94640 AIRWAY INHALATION TREATMENT: CPT

## 2018-01-29 PROCEDURE — A9270 NON-COVERED ITEM OR SERVICE: HCPCS | Performed by: HOSPITALIST

## 2018-01-29 PROCEDURE — 82962 GLUCOSE BLOOD TEST: CPT

## 2018-01-29 PROCEDURE — 700111 HCHG RX REV CODE 636 W/ 250 OVERRIDE (IP): Performed by: INTERNAL MEDICINE

## 2018-01-29 PROCEDURE — 700111 HCHG RX REV CODE 636 W/ 250 OVERRIDE (IP): Performed by: HOSPITALIST

## 2018-01-29 PROCEDURE — A9270 NON-COVERED ITEM OR SERVICE: HCPCS | Performed by: INTERNAL MEDICINE

## 2018-01-29 PROCEDURE — 770020 HCHG ROOM/CARE - TELE (206)

## 2018-01-29 PROCEDURE — 94660 CPAP INITIATION&MGMT: CPT

## 2018-01-29 RX ORDER — GUAIFENESIN/DEXTROMETHORPHAN 100-10MG/5
5 SYRUP ORAL EVERY 6 HOURS PRN
Status: DISCONTINUED | OUTPATIENT
Start: 2018-01-29 | End: 2018-02-01 | Stop reason: HOSPADM

## 2018-01-29 RX ORDER — OSELTAMIVIR PHOSPHATE 75 MG/1
75 CAPSULE ORAL EVERY 12 HOURS
Status: DISCONTINUED | OUTPATIENT
Start: 2018-01-29 | End: 2018-02-01 | Stop reason: HOSPADM

## 2018-01-29 RX ORDER — BENZONATATE 100 MG/1
100 CAPSULE ORAL 3 TIMES DAILY PRN
Status: DISCONTINUED | OUTPATIENT
Start: 2018-01-29 | End: 2018-02-01 | Stop reason: HOSPADM

## 2018-01-29 RX ORDER — BUDESONIDE 0.5 MG/2ML
0.5 INHALANT ORAL
Status: DISCONTINUED | OUTPATIENT
Start: 2018-01-29 | End: 2018-02-01 | Stop reason: HOSPADM

## 2018-01-29 RX ADMIN — ALBUTEROL SULFATE 2.5 MG: 2.5 SOLUTION RESPIRATORY (INHALATION) at 18:44

## 2018-01-29 RX ADMIN — ALBUTEROL SULFATE 2.5 MG: 2.5 SOLUTION RESPIRATORY (INHALATION) at 14:22

## 2018-01-29 RX ADMIN — POTASSIUM CHLORIDE AND SODIUM CHLORIDE: 900; 150 INJECTION, SOLUTION INTRAVENOUS at 15:55

## 2018-01-29 RX ADMIN — ENOXAPARIN SODIUM 30 MG: 100 INJECTION SUBCUTANEOUS at 21:38

## 2018-01-29 RX ADMIN — POTASSIUM CHLORIDE AND SODIUM CHLORIDE: 900; 150 INJECTION, SOLUTION INTRAVENOUS at 06:24

## 2018-01-29 RX ADMIN — ALBUTEROL SULFATE 2.5 MG: 2.5 SOLUTION RESPIRATORY (INHALATION) at 03:50

## 2018-01-29 RX ADMIN — GUAIFENESIN AND DEXTROMETHORPHAN 5 ML: 100; 10 SYRUP ORAL at 13:38

## 2018-01-29 RX ADMIN — GUAIFENESIN AND DEXTROMETHORPHAN 5 ML: 100; 10 SYRUP ORAL at 21:42

## 2018-01-29 RX ADMIN — ESCITALOPRAM OXALATE 20 MG: 10 TABLET ORAL at 21:38

## 2018-01-29 RX ADMIN — BUDESONIDE AND FORMOTEROL FUMARATE DIHYDRATE 2 PUFF: 160; 4.5 AEROSOL RESPIRATORY (INHALATION) at 06:33

## 2018-01-29 RX ADMIN — PREDNISONE 20 MG: 20 TABLET ORAL at 07:48

## 2018-01-29 RX ADMIN — OMEPRAZOLE 20 MG: 20 CAPSULE, DELAYED RELEASE ORAL at 07:48

## 2018-01-29 RX ADMIN — PROMETHAZINE HYDROCHLORIDE 25 MG: 25 TABLET ORAL at 07:48

## 2018-01-29 RX ADMIN — OSELTAMIVIR PHOSPHATE 75 MG: 75 CAPSULE ORAL at 07:48

## 2018-01-29 RX ADMIN — BUDESONIDE 0.5 MG: 0.5 SUSPENSION RESPIRATORY (INHALATION) at 18:49

## 2018-01-29 RX ADMIN — GUAIFENESIN AND DEXTROMETHORPHAN 5 ML: 100; 10 SYRUP ORAL at 07:48

## 2018-01-29 RX ADMIN — BENZONATATE 100 MG: 100 CAPSULE ORAL at 21:42

## 2018-01-29 RX ADMIN — ALBUTEROL SULFATE 2.5 MG: 2.5 SOLUTION RESPIRATORY (INHALATION) at 06:36

## 2018-01-29 RX ADMIN — BENZONATATE 100 MG: 100 CAPSULE ORAL at 13:35

## 2018-01-29 RX ADMIN — GUAIFENESIN AND DEXTROMETHORPHAN 5 ML: 100; 10 SYRUP ORAL at 02:20

## 2018-01-29 RX ADMIN — OSELTAMIVIR PHOSPHATE 75 MG: 75 CAPSULE ORAL at 21:38

## 2018-01-29 ASSESSMENT — ENCOUNTER SYMPTOMS
WHEEZING: 1
SORE THROAT: 1
COUGH: 1
MYALGIAS: 1
SPUTUM PRODUCTION: 1
FEVER: 1
CHILLS: 1
ABDOMINAL PAIN: 0
SHORTNESS OF BREATH: 1
CONSTIPATION: 0
WEAKNESS: 1
VOMITING: 1
DIAPHORESIS: 1
HEADACHES: 1
ROS GI COMMENTS: NO APPETITE
DIARRHEA: 0
HEMOPTYSIS: 0
DIZZINESS: 1
NAUSEA: 0

## 2018-01-29 ASSESSMENT — PAIN SCALES - GENERAL
PAINLEVEL_OUTOF10: 0

## 2018-01-29 NOTE — ED NOTES
"Chief Complaint   Patient presents with   • Shortness of Breath     Pt states she has been sick since Nov.  Pt does have asthma and has been on steroids since November.  Pt with increasing SOB, cough, body aches.     /72   Pulse 80   Resp (!) 28   Ht 1.778 m (5' 10\")   Wt (!) 152.9 kg (337 lb 1.3 oz)   SpO2 97%   BMI 48.37 kg/m²     "

## 2018-01-29 NOTE — PROGRESS NOTES
Bedside report completed. Assumed pt care. Pt A&O 4, resting in bed comfortably with no signs of labored breathing on 2L O2 with CPAP (pt does not wear O2 at home). Pt is medical. Pt call light within reach, bed in low position, upper bed rails up, non skid socks in place. Pt denies any pain or other distress at this time. Patient was updated on the plan of care for the day. All fall precautions in place. Will continue to monitor.

## 2018-01-29 NOTE — RESPIRATORY CARE
"Respiratory Therapy Update    Patient is flu+. Home cpap reservoir now has sterile H20 to water line & \"OK\" with patient. Room has Neb Block flowmeter for treatments  "

## 2018-01-29 NOTE — PROGRESS NOTES
Pt c/o coughing and would like could medicine.  Notifed REMBERTO Messina.  New order received to give pt guaifenesin, PO q6h, prn.

## 2018-01-29 NOTE — PROGRESS NOTES
Pt NPO at this time for US of abd. US told this RN they are going to do this at 1700.  Pt aware to not eat or drink prior to this.  Order clarified with Dr. Ram, and relayed to US tech that she wants a picture of the liver.

## 2018-01-29 NOTE — FLOWSHEET NOTE
01/29/18 1423   Interdisciplinary Plan of Care-Outcomes    Bronchodilator Outcome Patient at Stable Baseline   Education   Education Yes - Pt. / Family has been Instructed in use of Respiratory Medications and Adverse Reactions   RT Assessment of Delivered Medications   Evaluation of Medication Delivery Daily Yes-- Pt /Family has been Instructed in use of Respiratory Medications and Adverse Reactions   SVN Group   #SVN Performed Yes   Given By: Mouthpiece   Chest Exam   Respiration 17   Pulse 80   Work Of Breathing / Effort Mild   Breath Sounds   Pre/Post Intervention Pre Intervention Assessment   RUL Breath Sounds Expiratory Wheezes   RML Breath Sounds Diminished   RLL Breath Sounds Diminished   AYDEN Breath Sounds Expiratory Wheezes;Diminished   LLL Breath Sounds Diminished   Secretions   Cough Moist;Non Productive   How Sputum Obtained Spontaneous   Oximetry   #Pulse Oximetry (Single Determination) Yes

## 2018-01-29 NOTE — CARE PLAN
Problem: Infection  Goal: Will remain free from infection  Outcome: PROGRESSING AS EXPECTED    Intervention: Implement standard precautions and perform hand washing before and after patient contact  Hand washing every encounter. IV ports scrubbed with alcohol when hanging medicine. Patient watch for s/s of infection. Patient taucht to report s/s of infection, verbalizes understanding.      Problem: Knowledge Deficit  Goal: Knowledge of disease process/condition, treatment plan, diagnostic tests, and medications will improve  Outcome: PROGRESSING AS EXPECTED    Intervention: Assess knowledge level of disease process/condition, treatment plan, diagnostic tests, and medications  Discuss information regarding therapeutic regimen and document in education.  Implement medication teaching.  Pt verbalize understanding.

## 2018-01-29 NOTE — FLOWSHEET NOTE
01/28/18 2240   Events/Summary/Plan   Events/Summary/Plan SVN with MDI given by MP   Interdisciplinary Plan of Care-Goals (Indications)   Obstructive Ventilatory Defect or Pulmonary Disease without Obvious Obstruction History / Diagnosis   Interdisciplinary Plan of Care-Outcomes    Bronchodilator Outcome Improved Vital Signs and Measures of Gas Exchange   Education   Education Yes - Pt. / Family has been Instructed in use of Respiratory Medications and Adverse Reactions   RT Assessment of Delivered Medications   Evaluation of Medication Delivery Daily Yes-- Pt /Family has been Instructed in use of Respiratory Medications and Adverse Reactions   SVN Group   #SVN Performed Yes   Given By: Mouthpiece   Date SVN Last Changed 01/28/18   Date SVN Next Change Due (Q 7 Days) 02/04/18   MDI/DPI Group   #MDI/DPI Given MDI/DPI x 1   Chest Exam   Respiration 20   Pulse 76   Work Of Breathing / Effort Mild   Breath Sounds   RUL Breath Sounds Expiratory Wheezes   RML Breath Sounds Clear   RLL Breath Sounds Clear;Diminished   AYDEN Breath Sounds Expiratory Wheezes   LLL Breath Sounds Clear;Diminished   Secretions   Cough Non Productive;Strong   How Sputum Obtained Spontaneous   Oximetry   #Pulse Oximetry (Single Determination) Yes   Continuous Oximetry Yes   Oxygen   Home O2 Use Prior To Admission? No   Pulse Oximetry 94 %   O2 (LPM) 2   O2 Daily Delivery Respiratory  Silicone Nasal Cannula

## 2018-01-29 NOTE — RESPIRATORY CARE
" COPD EDUCATION by COPD CLINICAL EDUCATOR  1/29/2018 at 11:26 AM by Loretta Ceballos     Patient reviewed by COPD education team. Patient does not qualify for COPD program and unable to participate in full program.  Short intervention was completed with this patient covering: What is Asthma (how the lungs work), asthma medications, controlling asthma, asthma action plan and understanding asthma triggers were covered in detail.  A comprehensive packet including information about asthma, sarcoidosis and treatments given.  Discussed a personalized \"Asthma Action Plan\" was reviewed with and provided to the patient.  Patient gong to [facility] and seeing doctor [place] for asthma and sarcoidosis. Provided spacer with instruction for use, care, and cleaning.     "

## 2018-01-29 NOTE — PROGRESS NOTES
Notified by [facility] that pt is positive for flu A.  Notified MIGUE Messina, that pt is positive for flu A.   New order received to have pt on tamiflu.

## 2018-01-29 NOTE — FLOWSHEET NOTE
01/29/18 0350   Events/Summary/Plan   Events/Summary/Plan SVN with Neb Block flowmeter then back to home cpap(pt is flu+)   Interdisciplinary Plan of Care-Goals (Indications)   Obstructive Ventilatory Defect or Pulmonary Disease without Obvious Obstruction Physical Exam / Hyperinflation / Wheezing (bronchospasm)   Interdisciplinary Plan of Care-Outcomes    Bronchodilator Outcome Diminished Wheezing and Volume of Air Movement Increased   Education   Education Yes - Pt. / Family has been Instructed in use of Respiratory Medications and Adverse Reactions   RT Assessment of Delivered Medications   Evaluation of Medication Delivery Daily Yes-- Pt /Family has been Instructed in use of Respiratory Medications and Adverse Reactions   SVN Group   #SVN Performed Yes   Given By: Mouthpiece   Date SVN Last Changed 01/28/18   Date SVN Next Change Due (Q 7 Days) 02/04/18   Chest Exam   Respiration 18   Pulse 82   Work Of Breathing / Effort Mild   Breath Sounds   RUL Breath Sounds Expiratory Wheezes   RML Breath Sounds Clear   RLL Breath Sounds Clear;Diminished   AYDEN Breath Sounds Expiratory Wheezes   LLL Breath Sounds Clear;Diminished   Secretions   Cough Moist;Non Productive;Strong   How Sputum Obtained Spontaneous   Oximetry   #Pulse Oximetry (Single Determination) Yes   Continuous Oximetry Yes   Oxygen   Home O2 Use Prior To Admission? No   Pulse Oximetry 95 %   O2 (LPM) 2  (to cpap)

## 2018-01-29 NOTE — ASSESSMENT & PLAN NOTE
-Hold oral and home injected medication.  -Accu-Cheks every before meals and at bedtime, low-dose regular insulin sliding scale.  -sugars have been fine

## 2018-01-29 NOTE — ASSESSMENT & PLAN NOTE
Related to acute illness likely viral. Await for results of influenza testing.  Asthma exacerbation also contributing to symptoms.  Continue RT protocol and nebulized treatments as needed.

## 2018-01-29 NOTE — FLOWSHEET NOTE
01/28/18 2300   Events/Summary/Plan   Events/Summary/Plan home cpap set-up without H20 @ pt request   General Vent Information   Pulse Oximetry 92 %   CPAP/BiPAP [employer]   Nocturnal CPAP or BiPAP CPAP   #System Evaluation Yes   Home Unit Used? Yes   Equipment Inspected for Cleanliness, Operation, and Safety? Yes   Settings (If Known) 9.5   FiO2 or LPM 2   Home Mask Used? Yes

## 2018-01-29 NOTE — CARE PLAN
"Problem: Oxygenation:  Goal: Maintain adequate oxygenation dependent on patient condition    Intervention: Manage oxygen therapy by monitoring pulse oximetry and/or ABG values  Patient monitored @ bedside, continuous pulse oximetry.  Personal cpap is on this patient with FI02 @ 2 LPM \"bleed-in\" for Sp02 >90%  Intervention: Levels of oxygenation will improve to baseline  Patient did not use supplemental oxygen prior to this admit      Problem: Bronchoconstriction:  Goal: Improve in air movement and diminished wheezing  Outcome: PROGRESSING AS EXPECTED  Patient feels albuterol nebulizer is somewhat helpful: she understands her \"exacerbation\" diagnosis deems RT administer bronchodilator therapy every 4 hours for first 24 hours following this admit to [facility].  She is agreeable.   Intervention: Implement inhaled treatments  Patient received first nebulizer in ED:  subsequent albuterol given after transferring to patient room last night and tolerated without problems  Intervention: Evaluate and manage medication effects  Patient monitored before and after albuterol nebulizer and symbicort MDI administered.  Patient rinsed mouth following symbicort      Problem: Ventilation Defect:  Goal: Ability to achieve and maintain unassisted ventilation or tolerate decreased levels of ventilator support  Outcome: PROGRESSING AS EXPECTED  Home cpap set-up at patient's bedside- no sterile H20 added to reservoir (pt declined: \"not tonight\")   Intervention: Support and monitor invasive and noninvasive mechanical ventilation  Patient does have continuous pulse oximetry, home cpap on prescribed settings  set up and wearing  Supplemental oxygen to cpap: 2 LPM. Patient states she doesn't use supplemental oxygen at home with or without home cpap        "

## 2018-01-29 NOTE — CARE PLAN
Problem: Safety  Goal: Will remain free from injury    Intervention: Provide assistance with mobility  Pt mobility assessed at beginning of shift, pt requires standby assist to help with lines, pt is steady.  Fall precautions in place, non-slip socks on, bed in lowest, locked position and call light is within reach.  Pt educated to call for assistance and verbalizes understanding.       Problem: Infection  Goal: Will remain free from infection    Intervention: Assess signs and symptoms of infection  Pt is on droplet precaution for positive flu. Pt monitored for signs and symptoms of infection. Vitals and labs assessed and monitored for signs of infection. Proper hand hygiene performed prior to entering and exiting pt's room. Pt educated on proper hand hygiene.

## 2018-01-29 NOTE — ED PROVIDER NOTES
"ED Provider Note    CHIEF COMPLAINT  Chief Complaint   Patient presents with   • Shortness of Breath     Pt states she has been sick since Nov.  Pt does have asthma and has been on steroids since November.  Pt with increasing SOB, cough, body aches.       HPI  Latia Almanza is a 52 y.o. female who presents to the emergency department with difficulty with breathing. The patient states she's been periodically ill since November. She does have a known history of sleep apnea as well as asthma. She states she is currently on a steroid taper and has finished antibiotics in the past. She presents to emergency department increased work of breathing, cough, and diffuse myalgias. She is also subjective fevers. She is not any vomiting or diarrhea. She denies tobacco abuse.    REVIEW OF SYSTEMS  See HPI for further details. All other systems are negative.     PAST MEDICAL HISTORY  Past Medical History:   Diagnosis Date   • Allergic rhinitis    • Anxiety    • Back pain     back/ right hip   • Bronchitis 5/2016   • Dental disorder     upper implants and bridge   • Obesity    • Pneumonia 5/2016   • Restless leg syndrome    • Sarcoidosis (CMS-HCC)    • Sciatica    • Sleep apnea     CPAP   • Snoring        SOCIAL HISTORY  Social History     Social History   • Marital status: Single     Spouse name: N/A   • Number of children: N/A   • Years of education: N/A     Social History Main Topics   • Smoking status: Former Smoker     Packs/day: 0.25     Years: 5.00     Types: Cigarettes     Quit date: 1/20/2001   • Smokeless tobacco: Never Used   • Alcohol use 1.2 oz/week     2 Cans of beer per week      Comment: 4 per week   • Drug use: No   • Sexual activity: Not on file     Other Topics Concern   • Not on file     Social History Narrative   • No narrative on file           PHYSICAL EXAM  VITAL SIGNS: /72   Pulse 83   Temp (!) 38.2 °C (100.7 °F)   Resp (!) 32   Ht 1.778 m (5' 10\")   Wt (!) 152.9 kg (337 lb 1.3 oz)   SpO2 94%  "  BMI 48.37 kg/m²   Constitutional: The patient appears ill  HENT: Normocephalic, Atraumatic, tympanic membranes are intact and nonerythematous bilaterally, Oropharynx moist without exudates or erythema, Nose swollen turbinates  Eyes: PERRLA, EOMI, Conjunctiva normal.  Neck: Supple without meningismus  Lymphatic: No lymphadenopathy noted.   Cardiovascular: Normal heart rate, Normal rhythm, No murmurs, No rubs, No gallops.   Thorax & Lungs: Symmetrically diminished throughout, diffuse wheezing, tachypnea, no retractions  Abdomen: Obese, Bowel sounds normal, Soft, No tenderness, no rebound, no guarding, no distention, No masses, No pulsatile masses.   Skin: Warm, Dry, No erythema, No rash.   Back: No tenderness, No CVA tenderness.   Extremities: Atraumatic with symmetric distal pulses, No edema, No tenderness, No cyanosis, No clubbing.   Neurologic: Alert & oriented x 3, cranial nerves II through XII are intact, Normal motor function, Normal sensory function, No focal deficits noted.   Psychiatric: Affect normal, Judgment normal, Mood normal.     RADIOLOGY/PROCEDURES  DX-CHEST-PORTABLE (1 VIEW)   Final Result      No radiographic evidence of acute cardiopulmonary process.            COURSE & MEDICAL DECISION MAKING  Pertinent Labs & Imaging studies reviewed. (See chart for details)  This a 52-year-old female who presents with difficulty breathing. I suspect the patient has a viral process with an exacerbation of her asthma. The patient does not appear toxic. She continues to have dyspnea despite a breathing treatment but she does have increase in aeration clinically. She is not oxygen dependent but does feel better on oxygen therefore nasal cannula oxygen was applied. The patient admitted to the hospital in stable condition for observation. Chest x-ray does not show any evidence of pneumonia. She does have a slight transaminitis and this may be from her current viral process. Her influenza is currently  pending.    FINAL IMPRESSION  1. Viral illness   2. Reactive airway disease   3. Transaminitis  Disposition  The patient will be admitted in stable condition    Electronically signed by: Ilir Martinez, 1/28/2018 7:49 PM

## 2018-01-29 NOTE — H&P
Hospital Medicine History and Physical    Date of Service  1/28/2018    Chief Complaint  Chief Complaint   Patient presents with   • Shortness of Breath     Pt states she has been sick since Nov.  Pt does have asthma and has been on steroids since November.  Pt with increasing SOB, cough, body aches.       History of Presenting Illness  52 y.o. female who presented 1/28/2018 with shortness of breath wheezing low-grade fevers and body aches. She has a known history of asthma and is on prednisone taper at this moment is down to 10 mg per day. She is post a course of antibiotics recently. She comes in today complaining of feeling febrile, muscle aches, cough and wheezing. She feels that she is not getting any better.   Primary Care Physician  Pcp Pt States None    Consultants  None    Code Status  Full code    Review of Systems  Review of Systems   Constitutional: Negative.  Negative for chills, fever, malaise/fatigue and weight loss.   HENT: Negative.    Respiratory: Positive for cough, shortness of breath and wheezing. Negative for hemoptysis and sputum production.    Cardiovascular: Negative.  Negative for chest pain and leg swelling.   Gastrointestinal: Negative.  Negative for abdominal pain, nausea and vomiting.   Genitourinary: Negative.  Negative for dysuria and flank pain.   Musculoskeletal: Negative.  Negative for back pain and myalgias.   Neurological: Negative.  Negative for dizziness, loss of consciousness and weakness.   Endo/Heme/Allergies: Negative.  Does not bruise/bleed easily.   Psychiatric/Behavioral: Negative.  Negative for depression. The patient is not nervous/anxious.    All other systems reviewed and are negative.       Past Medical History  Past Medical History:   Diagnosis Date   • Pneumonia 5/2016   • Bronchitis 5/2016   • Allergic rhinitis    • Anxiety    • Back pain     back/ right hip   • Dental disorder     upper implants and bridge   • Obesity    • Restless leg syndrome    • Sarcoidosis  (CMS-[facility])    • Sciatica    • Sleep apnea     CPAP   • Snoring        Surgical History  Past Surgical History:   Procedure Laterality Date   • LUMBAR FUSION ANTERIOR  10/27/2016    Procedure: LUMBAR FUSION ANTERIOR L5-S1 ALIF;  Surgeon: Ronal Washington III, M.D.;  Location: SURGERY [facility];  Service:    • ABDOMINAL HYSTERECTOMY TOTAL     • ATHROPLASTY     • HIP ARTHROPLASTY TOTAL     • HIP REPLACEMENT, TOTAL     • HYSTERECTOMY LAPAROSCOPY     • LAPAROTOMY     • OTHER      dental implants   • OTHER ORTHOPEDIC SURGERY      R hip replacement March 2008   • SINUSCOPE         Medications  No current facility-administered medications on file prior to encounter.      Current Outpatient Prescriptions on File Prior to Encounter   Medication Sig Dispense Refill   • ONETOUCH VERIO strip      • BD PEN NEEDLE ALIZA U/F 32G X 4 MM Misc      • VICTOZA 18 MG/3ML Solution Pen-injector injection      • predniSONE (DELTASONE) 10 MG Tab Take 30mg x 5 days, then take 20mg x 5 days, then take 10mg x 5 days, with food, then discontinue. 30 Tab 2   • predniSONE (DELTASONE) 20 MG Tab Take 3 tablets daily for 3 days then 2 tablets daily for 7 days and then 1 tablet daily 43 Tab 0   • omeprazole (PRILOSEC) 20 MG delayed-release capsule Take 1 Cap by mouth every day. 30 Cap 2   • albuterol (PROVENTIL) 2.5mg/3ml Nebu Soln solution for nebulization 3 mL by Nebulization route every four hours as needed for Shortness of Breath. 75 mL 3   • fluticasone-salmeterol (ADVAIR DISKUS) 250-50 MCG/DOSE AEROSOL POWDER, BREATH ACTIVATED Inhale 1 Puff by mouth every 12 hours. 1 Inhaler 12   • guaifenesin-codeine (CHERATUSSIN AC) Solution oral solution Take 5 mL by mouth every 6 hours as needed. 120 mL 0   • albuterol 108 (90 Base) MCG/ACT Aero Soln inhalation aerosol Inhale 2 Puffs by mouth every 6 hours as needed for Shortness of Breath. 8.5 g 0   • escitalopram (LEXAPRO) 20 MG tablet Take 1 Tab by mouth every day. 30 Tab 11   • estradiol (VIVELLE DOT)  0.05 MG/24HR PATCH BIWEEKLY APPLY ONE PATCH TO CLEAN, DRY SKIN AS DIRECTED TWO TIMES PER WEEK. REMOVE OLD PATCH BEFORE APPLYING NEW. 8 Patch 0       Family History  Family History   Problem Relation Age of Onset   • Cancer Mother    • Lung Disease Mother    • Diabetes Mother    • Diabetes Father    • Heart Disease Father        Social History  Social History   Substance Use Topics   • Smoking status: Former Smoker     Packs/day: 0.25     Years: 5.00     Types: Cigarettes     Quit date: 2001   • Smokeless tobacco: Never Used   • Alcohol use 1.2 oz/week     2 Cans of beer per week      Comment: 4 per week       Allergies  No Known Allergies     Physical Exam  Laboratory   Hemodynamics  Temp (24hrs), Av.2 °C (100.8 °F), Min:38.2 °C (100.7 °F), Max:38.2 °C (100.8 °F)   Temperature: (!) 38.2 °C (100.8 °F)  Pulse  Av.2  Min: 77  Max: 85 Heart Rate (Monitored): 79  Blood Pressure: 150/72, NIBP: 140/57      Respiratory      Respiration: (!) 24, Pulse Oximetry: 100 %, O2 Daily Delivery Respiratory : Nasal Cannula     Given By:: Mouthpiece, Work Of Breathing / Effort: Mild  RUL Breath Sounds: Clear, RML Breath Sounds: Clear, RLL Breath Sounds: Clear;Diminished, AYDEN Breath Sounds: Clear, LLL Breath Sounds: Clear;Diminished    Physical Exam   Constitutional: She appears well-developed and well-nourished. No distress.   Obese woman otherwise well-developed.   HENT:   Nose: Nose normal.   Mouth/Throat: Oropharynx is clear and moist. No oropharyngeal exudate.   Eyes: Conjunctivae are normal. Right eye exhibits no discharge. Left eye exhibits no discharge. No scleral icterus.   Neck: No JVD present. No tracheal deviation present.   Cardiovascular: Normal rate, regular rhythm and normal heart sounds.    Pulmonary/Chest: No stridor. She is in respiratory distress. She has wheezes. She has no rales. She exhibits no tenderness.   Abdominal: Soft. Bowel sounds are normal. She exhibits no distension. There is no tenderness.    Musculoskeletal: She exhibits no edema or tenderness.   Neurological: She is alert. No cranial nerve deficit. She exhibits normal muscle tone.   Skin: Skin is warm and dry. She is not diaphoretic. No pallor.   Psychiatric: She has a normal mood and affect. Her behavior is normal.   Nursing note and vitals reviewed.      Recent Labs      01/28/18 1954   WBC  4.4*   RBC  4.16*   HEMOGLOBIN  12.5   HEMATOCRIT  37.2   MCV  89.4   MCH  30.0   MCHC  33.6   RDW  54.5*   PLATELETCT  242   MPV  9.3     Recent Labs      01/28/18 1954   SODIUM  140   POTASSIUM  3.4*   CHLORIDE  104   CO2  26   GLUCOSE  111*   BUN  8   CREATININE  1.07   CALCIUM  9.0     Recent Labs      01/28/18 1954   ALTSGPT  135*   ASTSGOT  81*   ALKPHOSPHAT  107*   TBILIRUBIN  0.7   GLUCOSE  111*                 Lab Results   Component Value Date    TROPONINI <0.02 11/23/2017     Urinalysis:    Lab Results  Component Value Date/Time   SPECGRAVITY 1.016 10/25/2016 1608   GLUCOSEUR Negative 10/25/2016 1608   KETONES Negative 10/25/2016 1608   NITRITE Negative 10/25/2016 1608   WBCURINE 0-2 10/25/2016 1608   RBCURINE 0-2 10/25/2016 1608   EPITHELCELL Few 10/25/2016 1608      Imaging  Chest x-ray No radiographic evidence of acute cardiopulmonary process.   Assessment/Plan     I anticipate this patient is appropriate for observation status at this time.    SOB (shortness of breath)   Assessment & Plan    Related to acute illness likely viral. Await for results of influenza testing.  Asthma exacerbation also contributing to symptoms.  Continue RT protocol and nebulized treatments as needed.        DM (diabetes mellitus) (CMS-HCC)- (present on admission)   Assessment & Plan    Hold oral and home injected medication.  Accu-Cheks every before meals and at bedtime, low-dose regular insulin sliding scale.        BMI 45.0-49.9, adult (CMS-HCC)- 45.7- (present on admission)   Assessment & Plan    Morbid obesity imparts higher risk of consultation during  hospitalization.        Asthma- (present on admission)   Assessment & Plan    With acute exacerbation secondary to upper respiratory infection.  No hypoxia, patient wearing oxygen for comfort.  She was on a prednisone taper, down to 10 mg going to increase it to 20 mg and continue during her hospital stay.  Respiratory therapy protocol. Denies treatments as needed. Continue Advair.  Influenza testing is pending.            VTE prophylaxis: SCDs .

## 2018-01-29 NOTE — DIETARY
NUTRITION SERVICES: BMI - Pt with BMI >40 (=48.37). Weight loss counseling not appropriate in acute care setting.     RECOMMEND - Referral to outpatient nutrition services for weight management after D/C.     RD available PRN.

## 2018-01-29 NOTE — ED NOTES
Med per erp. Pt side l;gerson, states no chg in discomfort, sr on moniter, mild distress noted with dry cough audible intermitt

## 2018-01-29 NOTE — ED NOTES
Assumed care of pt upon return from x-ray in mod resp distress. Oxygen sat=97%. Wheezing rt side audible. Saline lock with blood draw.

## 2018-01-30 LAB
GLUCOSE BLD-MCNC: 111 MG/DL (ref 65–99)
GLUCOSE BLD-MCNC: 130 MG/DL (ref 65–99)
GLUCOSE BLD-MCNC: 132 MG/DL (ref 65–99)
GLUCOSE BLD-MCNC: 94 MG/DL (ref 65–99)

## 2018-01-30 PROCEDURE — 700111 HCHG RX REV CODE 636 W/ 250 OVERRIDE (IP): Performed by: INTERNAL MEDICINE

## 2018-01-30 PROCEDURE — 94640 AIRWAY INHALATION TREATMENT: CPT

## 2018-01-30 PROCEDURE — 94660 CPAP INITIATION&MGMT: CPT

## 2018-01-30 PROCEDURE — 770020 HCHG ROOM/CARE - TELE (206)

## 2018-01-30 PROCEDURE — 700101 HCHG RX REV CODE 250: Performed by: HOSPITALIST

## 2018-01-30 PROCEDURE — 700102 HCHG RX REV CODE 250 W/ 637 OVERRIDE(OP): Performed by: INTERNAL MEDICINE

## 2018-01-30 PROCEDURE — 700111 HCHG RX REV CODE 636 W/ 250 OVERRIDE (IP): Performed by: HOSPITALIST

## 2018-01-30 PROCEDURE — 700102 HCHG RX REV CODE 250 W/ 637 OVERRIDE(OP): Performed by: HOSPITALIST

## 2018-01-30 PROCEDURE — A9270 NON-COVERED ITEM OR SERVICE: HCPCS | Performed by: HOSPITALIST

## 2018-01-30 PROCEDURE — 94760 N-INVAS EAR/PLS OXIMETRY 1: CPT

## 2018-01-30 PROCEDURE — A9270 NON-COVERED ITEM OR SERVICE: HCPCS | Performed by: INTERNAL MEDICINE

## 2018-01-30 PROCEDURE — 82962 GLUCOSE BLOOD TEST: CPT

## 2018-01-30 PROCEDURE — 99232 SBSQ HOSP IP/OBS MODERATE 35: CPT | Performed by: HOSPITALIST

## 2018-01-30 RX ADMIN — GUAIFENESIN AND DEXTROMETHORPHAN 5 ML: 100; 10 SYRUP ORAL at 17:28

## 2018-01-30 RX ADMIN — OMEPRAZOLE 20 MG: 20 CAPSULE, DELAYED RELEASE ORAL at 09:18

## 2018-01-30 RX ADMIN — OSELTAMIVIR PHOSPHATE 75 MG: 75 CAPSULE ORAL at 09:18

## 2018-01-30 RX ADMIN — ALBUTEROL SULFATE 2.5 MG: 2.5 SOLUTION RESPIRATORY (INHALATION) at 19:25

## 2018-01-30 RX ADMIN — BENZONATATE 100 MG: 100 CAPSULE ORAL at 15:21

## 2018-01-30 RX ADMIN — POTASSIUM CHLORIDE AND SODIUM CHLORIDE 1000 ML: 900; 150 INJECTION, SOLUTION INTRAVENOUS at 00:22

## 2018-01-30 RX ADMIN — BENZONATATE 100 MG: 100 CAPSULE ORAL at 06:27

## 2018-01-30 RX ADMIN — BUDESONIDE 0.5 MG: 0.5 SUSPENSION RESPIRATORY (INHALATION) at 19:25

## 2018-01-30 RX ADMIN — ALBUTEROL SULFATE 2.5 MG: 2.5 SOLUTION RESPIRATORY (INHALATION) at 14:36

## 2018-01-30 RX ADMIN — BUDESONIDE 0.5 MG: 0.5 SUSPENSION RESPIRATORY (INHALATION) at 10:57

## 2018-01-30 RX ADMIN — ALBUTEROL SULFATE 2.5 MG: 2.5 SOLUTION RESPIRATORY (INHALATION) at 10:57

## 2018-01-30 RX ADMIN — GUAIFENESIN AND DEXTROMETHORPHAN 5 ML: 100; 10 SYRUP ORAL at 23:53

## 2018-01-30 RX ADMIN — POTASSIUM CHLORIDE AND SODIUM CHLORIDE: 900; 150 INJECTION, SOLUTION INTRAVENOUS at 09:17

## 2018-01-30 RX ADMIN — ENOXAPARIN SODIUM 30 MG: 100 INJECTION SUBCUTANEOUS at 21:18

## 2018-01-30 RX ADMIN — BENZONATATE 100 MG: 100 CAPSULE ORAL at 21:17

## 2018-01-30 RX ADMIN — OSELTAMIVIR PHOSPHATE 75 MG: 75 CAPSULE ORAL at 21:17

## 2018-01-30 RX ADMIN — PREDNISONE 20 MG: 20 TABLET ORAL at 09:18

## 2018-01-30 RX ADMIN — ESCITALOPRAM OXALATE 20 MG: 10 TABLET ORAL at 21:17

## 2018-01-30 RX ADMIN — GUAIFENESIN AND DEXTROMETHORPHAN 5 ML: 100; 10 SYRUP ORAL at 10:19

## 2018-01-30 RX ADMIN — GUAIFENESIN AND DEXTROMETHORPHAN 5 ML: 100; 10 SYRUP ORAL at 03:59

## 2018-01-30 RX ADMIN — ENOXAPARIN SODIUM 30 MG: 100 INJECTION SUBCUTANEOUS at 09:17

## 2018-01-30 ASSESSMENT — ENCOUNTER SYMPTOMS
DIZZINESS: 0
FEVER: 0
ABDOMINAL PAIN: 0
CHILLS: 0
COUGH: 1
PALPITATIONS: 0
VOMITING: 0
WHEEZING: 1
SHORTNESS OF BREATH: 1
HEADACHES: 0
NAUSEA: 0
MYALGIAS: 0

## 2018-01-30 ASSESSMENT — PAIN SCALES - GENERAL
PAINLEVEL_OUTOF10: 0

## 2018-01-30 NOTE — PROGRESS NOTES
Tele Note  SR   HR 70-79  (.16/.08/.40)  No ectopy    Primary RN responsible for further monitoring and communication with the MT

## 2018-01-30 NOTE — FLOWSHEET NOTE
01/29/18 2214   Events/Summary/Plan   Events/Summary/Plan Pt remains on home cpap at this time. pt does  not appear in distress    General Vent Information   Pulse Oximetry 93 %   Heart Rate (Monitored) 72   CPAP/BiPAP [employer]   Nocturnal CPAP or BiPAP CPAP   #System Evaluation Yes   Home Unit Used? Yes   Equipment Inspected for Cleanliness, Operation, and Safety? Yes   Settings (If Known) 9.5   FiO2 or LPM 2   Home Mask Used? Yes   Chest Exam   Work Of Breathing / Effort Mild   Breath Sounds   Pre/Post Intervention Pre Intervention Assessment   RUL Breath Sounds Diminished   RML Breath Sounds Diminished   RLL Breath Sounds Diminished   AYDEN Breath Sounds Diminished   LLL Breath Sounds Diminished

## 2018-01-30 NOTE — FLOWSHEET NOTE
01/29/18 1840   Events/Summary/Plan   Events/Summary/Plan Pt foudn on home cpap unit at this time, Pt does no appear to be in respiratory distress at this time.   General Vent Information   Pulse Oximetry 95 %   Heart Rate (Monitored) 78   CPAP/BiPAP [employer]   Nocturnal CPAP or BiPAP CPAP   #System Evaluation Yes   Home Unit Used? Yes   Equipment Inspected for Cleanliness, Operation, and Safety? Yes   Settings (If Known) 9.5   FiO2 or LPM 2   Home Mask Used? Yes   Breath Sounds   Pre/Post Intervention Pre Intervention Assessment   RUL Breath Sounds Diminished   RML Breath Sounds Diminished   RLL Breath Sounds Diminished   AYDEN Breath Sounds Diminished   LLL Breath Sounds Diminished   Secretions   Cough Moist;Non Productive

## 2018-01-30 NOTE — FLOWSHEET NOTE
01/29/18 2220   Events/Summary/Plan   Events/Summary/Plan Pt refused SVn at this time    Therapy Not Performed   Type of Therapy Not Performed SVN   Reason Therapy Not Performed Refused   Chest Exam   Respiration 18   Pulse 75   Breath Sounds   Pre/Post Intervention Pre Intervention Assessment   RUL Breath Sounds Clear   RML Breath Sounds Diminished   RLL Breath Sounds Diminished   AYDEN Breath Sounds Clear   LLL Breath Sounds Diminished   Oximetry   #Pulse Oximetry (Single Determination) Yes   Oxygen   Pulse Oximetry 93 %   O2 (LPM) 2   O2 Daily Delivery Respiratory  (On cpap )

## 2018-01-30 NOTE — PROGRESS NOTES
"[facility]ist Progress Note    Date of Service: 2018    Chief Complaint  52 y.o. morbidly obese diabetic female admitted 2018 with persistent wheezing and shortness of breath. Influenza screen was A positive.    Interval Problem Update  Feeling \"minimally\" better  Feels ok until she has to move around, getting to bathroom causes her to become very out of breath  Still significantly wheezy and tight    Consultants/Specialty  None    Disposition  TBD    Code:  FULL      Review of Systems   Constitutional: Negative for chills and fever.   Respiratory: Positive for cough, shortness of breath (improving somewhat) and wheezing.    Cardiovascular: Negative for chest pain and palpitations.   Gastrointestinal: Negative for abdominal pain, nausea and vomiting.   Genitourinary: Negative for dysuria.   Musculoskeletal: Negative for myalgias.   Neurological: Negative for dizziness and headaches.      Physical Exam  Laboratory/Imaging   Hemodynamics  Temp (24hrs), Av.2 °C (99 °F), Min:36.9 °C (98.4 °F), Max:37.9 °C (100.2 °F)   Temperature: 37 °C (98.6 °F)  Pulse  Av.1  Min: 67  Max: 88 Heart Rate (Monitored): 74  Blood Pressure: 145/69      Respiratory      Respiration: 18, Pulse Oximetry: 95 %, O2 Daily Delivery Respiratory :  (On cpap )     Given By:: Mouthpiece, Work Of Breathing / Effort: Mild  RUL Breath Sounds: Clear, RML Breath Sounds: Diminished, RLL Breath Sounds: Diminished, AYDEN Breath Sounds: Clear, LLL Breath Sounds: Diminished    Fluids    Intake/Output Summary (Last 24 hours) at 18 0949  Last data filed at 18 1000   Gross per 24 hour   Intake                0 ml   Output               50 ml   Net              -50 ml       Nutrition  Orders Placed This Encounter   Procedures   • Diet Order     Standing Status:   Standing     Number of Occurrences:   1     Order Specific Question:   Diet:     Answer:   Diabetic [3]     Physical Exam   Constitutional: She is oriented to person, " place, and time. She appears well-developed and well-nourished.   Morbidly obese   Cardiovascular: Normal rate, regular rhythm and normal heart sounds.    No murmur heard.  Pulmonary/Chest: She is in respiratory distress (mild). She has wheezes (significant, bilateral). She has no rales.   Abdominal: Soft. Bowel sounds are normal. She exhibits no distension. There is no tenderness.   Musculoskeletal: Normal range of motion. She exhibits no edema.   Neurological: She is alert and oriented to person, place, and time.   Skin: Skin is warm and dry. No erythema.   Nursing note and vitals reviewed.      Recent Labs      01/28/18 1954 01/29/18   0424   WBC  4.4*  3.4*   RBC  4.16*  3.87*   HEMOGLOBIN  12.5  11.4*   HEMATOCRIT  37.2  35.5*   MCV  89.4  91.7   MCH  30.0  29.5   MCHC  33.6  32.1*   RDW  54.5*  57.1*   PLATELETCT  242  210   MPV  9.3  9.8     Recent Labs      01/28/18 1954 01/29/18   0424   SODIUM  140  141   POTASSIUM  3.4*  3.3*   CHLORIDE  104  107   CO2  26  24   GLUCOSE  111*  110*   BUN  8  5*   CREATININE  1.07  0.95   CALCIUM  9.0  8.3*                      Assessment/Plan     * Asthma exacerbation- (present on admission)   Assessment & Plan    -2/2 influenza  -continue steroids for significant wheezing, tight airways        Influenza A- (present on admission)   Assessment & Plan    -continue Tamiflu, droplet iso        Acute respiratory failure with hypoxia (CMS-HCC)- (present on admission)   Assessment & Plan    -2/2 asthma and influenza  -will do home O2 eval today, says she still gets extremely winded when gets up to the bathroom  -RT protocol        Hypokalemia- (present on admission)   Assessment & Plan    -replacing with fluids, recheck in am, check mag        Transaminitis- (present on admission)   Assessment & Plan    -Likely 2/2 steatosis/ DM/ Obesity  -Hep negative; fatty infiltration on US        Sleep apnea- (present on admission)   Assessment & Plan    -Compliant with CPAP           Leukopenia- (present on admission)   Assessment & Plan    -Atyical given she is on steroids, recheck again in am, ?2/2 flu  -baseline normal        DM (diabetes mellitus) (CMS-HCC)- (present on admission)   Assessment & Plan    -Hold oral and home injected medication.  -Accu-Cheks every before meals and at bedtime, low-dose regular insulin sliding scale.  -sugars fine        BMI 45.0-49.9, adult (CMS-HCC)- 45.7- (present on admission)   Assessment & Plan    -encourage weight loss  -likely contributes to her pulmonary status            Reviewed items::  Labs reviewed, Medications reviewed and Radiology images reviewed (no infiltrates)  Menon catheter::  No Menon  DVT prophylaxis pharmacological::  Enoxaparin (Lovenox)  Ulcer Prophylaxis::  Yes (chronic medication)  Antibiotics:  Treating active infection/contamination beyond 24 hours perioperative coverage (antiviral)

## 2018-01-30 NOTE — FLOWSHEET NOTE
01/30/18 1100   Events/Summary/Plan   Non-Invasive Resp Device Site Inspection Completed Intact   Interdisciplinary Plan of Care-Goals (Indications)   Obstructive Ventilatory Defect or Pulmonary Disease without Obvious Obstruction Physical Exam / Hyperinflation / Wheezing (bronchospasm)   Interdisciplinary Plan of Care-Outcomes    Bronchodilator Outcome Patient at Stable Baseline   Education   Education Yes - Pt. / Family has been Instructed in use of Respiratory Equipment;Yes - Pt. / Family has been Instructed in use of Respiratory Medications and Adverse Reactions   RT Assessment of Delivered Medications   Evaluation of Medication Delivery Daily Yes-- Pt /Family has been Instructed in use of Respiratory Medications and Adverse Reactions   SVN Group   #SVN Performed Yes   Given By: Mouthpiece   Respiratory WDL   Respiratory (WDL) X   Chest Exam   Respiration 18   Heart Rate (Monitored) 66   Breath Sounds   Pre/Post Intervention Pre Intervention Assessment   RUL Breath Sounds Diminished;Expiratory Wheezes   RML Breath Sounds Diminished;Expiratory Wheezes   RLL Breath Sounds Diminished;Expiratory Wheezes   AYDEN Breath Sounds Diminished;Expiratory Wheezes   LLL Breath Sounds Diminished   Oximetry   #Pulse Oximetry (Single Determination) Yes   Oxygen   Home O2 Use Prior To Admission? No   Pulse Oximetry 96 %   O2 (LPM) 2   O2 Daily Delivery Respiratory  Silicone Nasal Cannula

## 2018-01-30 NOTE — PROGRESS NOTES
[facility]ist Progress Note    Date of Service: 2018    Chief Complaint  52 y.o. morbidly obese diabetic female admitted 2018 with persistent wheezing and shortness of breath. Influenza screen is positive.    Interval Problem Update  Patient has persistent coughing-Robitussin is not helping, Tessalon was added  Coughing is so bad she is having vomiting. She has no appetite. She is compliant with her CPAP while sleeping  She says she is not on home oxygen.    Consultants/Specialty  None    Disposition  TBD        Review of Systems   Constitutional: Positive for chills, diaphoresis, fever and malaise/fatigue.   HENT: Positive for sore throat (2/2 cough).    Respiratory: Positive for cough, sputum production, shortness of breath and wheezing. Negative for hemoptysis.    Cardiovascular: Positive for chest pain (w/ cough).   Gastrointestinal: Positive for vomiting (2/2 gagging from coughing). Negative for abdominal pain, constipation, diarrhea and nausea.        No appetite   Genitourinary: Negative for dysuria.   Musculoskeletal: Positive for joint pain (mostly from coughing) and myalgias.   Neurological: Positive for dizziness, weakness and headaches.      Physical Exam  Laboratory/Imaging   Hemodynamics  Temp (24hrs), Av.7 °C (99.8 °F), Min:37.3 °C (99.1 °F), Max:38.2 °C (100.8 °F)   Temperature: 37.3 °C (99.2 °F)  Pulse  Av.4  Min: 70  Max: 85 Heart Rate (Monitored): 78  Blood Pressure: 148/69, NIBP: 154/68      Respiratory      Respiration: 20, Pulse Oximetry: 92 %, O2 Daily Delivery Respiratory : Silicone Nasal Cannula     Given By:: Mouthpiece, #MDI/DPI Given: MDI/DPI x 1, Work Of Breathing / Effort: Mild  RUL Breath Sounds: Expiratory Wheezes, RML Breath Sounds: Diminished, RLL Breath Sounds: Diminished, AYDEN Breath Sounds: Expiratory Wheezes;Diminished, LLL Breath Sounds: Diminished    Fluids    Intake/Output Summary (Last 24 hours) at 18 6846  Last data filed at 18 6596    Gross per 24 hour   Intake             1360 ml   Output              900 ml   Net              460 ml       Nutrition  Orders Placed This Encounter   Procedures   • Diet Order     Standing Status:   Standing     Number of Occurrences:   1     Order Specific Question:   Diet:     Answer:   Diabetic [3]     Physical Exam   Constitutional: She is oriented to person, place, and time. She appears well-developed. She appears distressed.   Extremely obese   HENT:   Head: Normocephalic and atraumatic.   Mouth/Throat: Oropharynx is clear and moist.   Eyes: EOM are normal. Pupils are equal, round, and reactive to light. Right eye exhibits no discharge. Left eye exhibits no discharge.   Cardiovascular: Normal rate and regular rhythm.    Pulmonary/Chest: She is in respiratory distress (borderline due to coughing). She has wheezes. She has no rales. She exhibits no tenderness.   Very poor air movement with persistent hacking cough, coarse wheeze, bronchospasm. poor excursion   Abdominal: Soft. She exhibits no distension. There is no tenderness.   Obese  Decreased bowel sounds   Musculoskeletal: She exhibits edema (trace nonpitting). She exhibits no tenderness.   Neurological: She is alert and oriented to person, place, and time. No cranial nerve deficit.   Skin: Skin is warm. She is diaphoretic.   Psychiatric: She has a normal mood and affect.   Nursing note and vitals reviewed.      Recent Labs      01/28/18 1954 01/29/18 0424   WBC  4.4*  3.4*   RBC  4.16*  3.87*   HEMOGLOBIN  12.5  11.4*   HEMATOCRIT  37.2  35.5*   MCV  89.4  91.7   MCH  30.0  29.5   MCHC  33.6  32.1*   RDW  54.5*  57.1*   PLATELETCT  242  210   MPV  9.3  9.8     Recent Labs      01/28/18 1954 01/29/18 0424   SODIUM  140  141   POTASSIUM  3.4*  3.3*   CHLORIDE  104  107   CO2  26  24   GLUCOSE  111*  110*   BUN  8  5*   CREATININE  1.07  0.95   CALCIUM  9.0  8.3*                      Assessment/Plan     * Influenza A- (present on admission)    Assessment & Plan    Tamiflu, droplet iso        Acute respiratory failure with hypoxia (CMS-HCC)- (present on admission)   Assessment & Plan    2/2 asthma and influenza        Asthma exacerbation- (present on admission)   Assessment & Plan    With acute exacerbation secondary to upper respiratory infection.  No hypoxia, patient wearing oxygen for comfort.  She was on a prednisone taper, down to 10 mg going to increase it to 20 mg and continue during her hospital stay.  Respiratory therapy protocol. Denies treatments as needed. Continue Advair.  Influenza testing is pending.        Leukopenia   Assessment & Plan    Atypical given she is on steroids  OP f/u        Transaminitis   Assessment & Plan    Likely 2/2 steatosis/ DM/ Obesity  Check serologies, US        DM (diabetes mellitus) (CMS-HCC)- (present on admission)   Assessment & Plan    Hold oral and home injected medication.  Accu-Cheks every before meals and at bedtime, low-dose regular insulin sliding scale.        BMI 45.0-49.9, adult (CMS-HCC)- 45.7- (present on admission)   Assessment & Plan    Morbid obesity imparts higher risk of consultation during hospitalization.            Reviewed items::  Labs reviewed, Medications reviewed and Radiology images reviewed (no infiltrates)  Menon catheter::  No Menon  DVT prophylaxis pharmacological::  Enoxaparin (Lovenox)  Ulcer Prophylaxis: chronic medication.  : antiviral.

## 2018-01-30 NOTE — PROGRESS NOTES
Denies any pain, having non productive cough on tessalon and robitussin as needed for cough, on tamiflu, droplet isolation for Flu A observed, walk around the room.

## 2018-01-30 NOTE — FLOWSHEET NOTE
01/30/18 0700   Events/Summary/Plan   Events/Summary/Plan pt. remains on CPAP unit at this time.  Pt. wants to sleep, doesnt want TX   Therapy Not Performed   Type of Therapy Not Performed SVN   Reason Therapy Not Performed Refused   Oxygen   Pulse Oximetry 96 %

## 2018-01-30 NOTE — PROGRESS NOTES
BS report received. Patient in bed, alert and oriented. No c/o pain, nausea or sob at this time. POC discussed, verbalized understanding. Bed low and locked, bed alarm on. Call light and belonging within reach and demonstrated use of call light. Fall precaution in effect. Isolation observed. Hourly rounding in place.

## 2018-01-30 NOTE — FLOWSHEET NOTE
01/30/18 1436   Interdisciplinary Plan of Care-Goals (Indications)   Obstructive Ventilatory Defect or Pulmonary Disease without Obvious Obstruction History / Diagnosis   Interdisciplinary Plan of Care-Outcomes    Bronchodilator Outcome Patient at Stable Baseline   RT Assessment of Delivered Medications   Evaluation of Medication Delivery Daily Yes-- Pt /Family has been Instructed in use of Respiratory Medications and Adverse Reactions   SVN Group   #SVN Performed Yes   Given By: Mouthpiece   Respiratory WDL   Respiratory (WDL) X   Chest Exam   Respiration 18   Heart Rate (Monitored) 70   Breath Sounds   Pre/Post Intervention Pre Intervention Assessment   RUL Breath Sounds Clear;Diminished   RML Breath Sounds Clear;Diminished   RLL Breath Sounds Diminished   AYDEN Breath Sounds Clear;Diminished   LLL Breath Sounds Diminished   Secretions   Cough Non Productive;Strong;Dry   How Sputum Obtained Spontaneous   Oxygen   O2 (LPM) 2   O2 Daily Delivery Respiratory  Silicone Nasal Cannula

## 2018-01-30 NOTE — FLOWSHEET NOTE
01/29/18 1845   Events/Summary/Plan   Events/Summary/Plan SVN given pt found on her home cpap    Interdisciplinary Plan of Care-Goals (Indications)   Obstructive Ventilatory Defect or Pulmonary Disease without Obvious Obstruction Physical Exam / Hyperinflation / Wheezing (bronchospasm)   Interdisciplinary Plan of Care-Outcomes    Bronchodilator Outcome Improvement in Airflow (peak flow, PFT)   Education   Education Yes - Pt. / Family has been Instructed in use of Respiratory Medications and Adverse Reactions;Yes - Pt. / Family has been Instructed in use of Respiratory Equipment   RT Assessment of Delivered Medications   Evaluation of Medication Delivery Daily Yes-- Pt /Family has been Instructed in use of Respiratory Medications and Adverse Reactions   SVN Group   #SVN Performed Yes   Given By: Mouthpiece   Chest Exam   Respiration 18   Pulse 76   Work Of Breathing / Effort Mild   Breath Sounds   Pre/Post Intervention Post Intervention Assessment   Secretions   Cough Moist;Non Productive   How Sputum Obtained Spontaneous   Oximetry   #Pulse Oximetry (Single Determination) Yes   Oxygen   Pulse Oximetry 95 %   O2 (LPM) 2   O2 Daily Delivery Respiratory  (On cpap )

## 2018-01-30 NOTE — CARE PLAN
Problem: Oxygenation:  Goal: Maintain adequate oxygenation dependent on patient condition  Outcome: PROGRESSING SLOWER THAN EXPECTED  Patient O2 will be titrated to maintain saturation by pulse oximetry >90%. Current fiO2 2 lpm nasal cannula whe awake and titrated into cpap when sleeping .      Intervention: Levels of oxygenation will improve to baseline  P is on room air at baseline at home and cpap at night       Problem: Bronchoconstriction:  Goal: Improve in air movement and diminished wheezing  Outcome: PROGRESSING AS EXPECTED  Diminished wheezing and volume of air movement increased     Improvement in airflow    Improved vital signs and measures of gas exchange          Intervention: Implement inhaled treatments  Albuterol Q4 ATC and pulmicort BID    Intervention: Evaluate and manage medication effects  Improved patient appearance with subjective improvement of symptom alleviation after treatment.

## 2018-01-30 NOTE — FLOWSHEET NOTE
01/30/18 0330   Events/Summary/Plan   Events/Summary/Plan Pt remains asleep on home cpap unit at this time    General Vent Information   Pulse Oximetry 95 %   Heart Rate (Monitored) 74   CPAP/BiPAP [facility]   Nocturnal CPAP or BiPAP CPAP   #System Evaluation Yes   Home Unit Used? Yes   Equipment Inspected for Cleanliness, Operation, and Safety? Yes   Settings (If Known) 9.5   FiO2 or LPM 2   Home Mask Used? Yes   Breath Sounds   Pre/Post Intervention Pre Intervention Assessment   RUL Breath Sounds Clear   RML Breath Sounds Diminished   RLL Breath Sounds Diminished   AYDEN Breath Sounds Clear   LLL Breath Sounds Diminished

## 2018-01-30 NOTE — CARE PLAN
Problem: Safety  Goal: Will remain free from injury  Outcome: PROGRESSING AS EXPECTED    Intervention: Provide assistance with mobility  Encourage to call for any assistance needed, call light within reach.      Problem: Infection  Goal: Will remain free from infection  Outcome: PROGRESSING AS EXPECTED    Intervention: Implement standard precautions and perform hand washing before and after patient contact  Droplet isolation for Flu A observed, on tamiflu, monitor S/S of infections, monitor for fever,infection prevention observed.

## 2018-01-30 NOTE — RESPIRATORY CARE
Home CPAP note:    Patient was placed on home Cpap unit. Settings currently are  EPAP 9.2 and Fio2 2.5 lpm. Patient can demonstrate removal of mask at this time. RN agrees. If patient becomes unable to remove Cpap, pt will be placed on supplimental O2 and MD will be notified of Pt change in status.

## 2018-01-31 LAB
ANION GAP SERPL CALC-SCNC: 6 MMOL/L (ref 0–11.9)
BACTERIA UR CULT: NORMAL
BUN SERPL-MCNC: 7 MG/DL (ref 8–22)
CALCIUM SERPL-MCNC: 8.6 MG/DL (ref 8.4–10.2)
CHLORIDE SERPL-SCNC: 106 MMOL/L (ref 96–112)
CO2 SERPL-SCNC: 24 MMOL/L (ref 20–33)
CREAT SERPL-MCNC: 0.91 MG/DL (ref 0.5–1.4)
ERYTHROCYTE [DISTWIDTH] IN BLOOD BY AUTOMATED COUNT: 54.8 FL (ref 35.9–50)
GLUCOSE BLD-MCNC: 99 MG/DL (ref 65–99)
GLUCOSE SERPL-MCNC: 106 MG/DL (ref 65–99)
HCT VFR BLD AUTO: 34.7 % (ref 37–47)
HGB BLD-MCNC: 11.5 G/DL (ref 12–16)
MAGNESIUM SERPL-MCNC: 2 MG/DL (ref 1.5–2.5)
MCH RBC QN AUTO: 30 PG (ref 27–33)
MCHC RBC AUTO-ENTMCNC: 33.1 G/DL (ref 33.6–35)
MCV RBC AUTO: 90.6 FL (ref 81.4–97.8)
PLATELET # BLD AUTO: 232 K/UL (ref 164–446)
PMV BLD AUTO: 9.8 FL (ref 9–12.9)
POTASSIUM SERPL-SCNC: 3.2 MMOL/L (ref 3.6–5.5)
RBC # BLD AUTO: 3.83 M/UL (ref 4.2–5.4)
SIGNIFICANT IND 70042: NORMAL
SITE SITE: NORMAL
SODIUM SERPL-SCNC: 136 MMOL/L (ref 135–145)
SOURCE SOURCE: NORMAL
WBC # BLD AUTO: 3.4 K/UL (ref 4.8–10.8)

## 2018-01-31 PROCEDURE — 700111 HCHG RX REV CODE 636 W/ 250 OVERRIDE (IP): Performed by: INTERNAL MEDICINE

## 2018-01-31 PROCEDURE — 83735 ASSAY OF MAGNESIUM: CPT

## 2018-01-31 PROCEDURE — 85027 COMPLETE CBC AUTOMATED: CPT

## 2018-01-31 PROCEDURE — A9270 NON-COVERED ITEM OR SERVICE: HCPCS | Performed by: HOSPITALIST

## 2018-01-31 PROCEDURE — 94760 N-INVAS EAR/PLS OXIMETRY 1: CPT

## 2018-01-31 PROCEDURE — 700102 HCHG RX REV CODE 250 W/ 637 OVERRIDE(OP): Performed by: HOSPITALIST

## 2018-01-31 PROCEDURE — 700111 HCHG RX REV CODE 636 W/ 250 OVERRIDE (IP): Performed by: HOSPITALIST

## 2018-01-31 PROCEDURE — 94640 AIRWAY INHALATION TREATMENT: CPT

## 2018-01-31 PROCEDURE — 700101 HCHG RX REV CODE 250: Performed by: HOSPITALIST

## 2018-01-31 PROCEDURE — 94660 CPAP INITIATION&MGMT: CPT

## 2018-01-31 PROCEDURE — 770020 HCHG ROOM/CARE - TELE (206)

## 2018-01-31 PROCEDURE — 82962 GLUCOSE BLOOD TEST: CPT

## 2018-01-31 PROCEDURE — 99232 SBSQ HOSP IP/OBS MODERATE 35: CPT | Performed by: HOSPITALIST

## 2018-01-31 PROCEDURE — A9270 NON-COVERED ITEM OR SERVICE: HCPCS | Performed by: INTERNAL MEDICINE

## 2018-01-31 PROCEDURE — 700102 HCHG RX REV CODE 250 W/ 637 OVERRIDE(OP): Performed by: INTERNAL MEDICINE

## 2018-01-31 PROCEDURE — 36415 COLL VENOUS BLD VENIPUNCTURE: CPT

## 2018-01-31 PROCEDURE — 80048 BASIC METABOLIC PNL TOTAL CA: CPT

## 2018-01-31 RX ORDER — POTASSIUM CHLORIDE 20 MEQ/1
40 TABLET, EXTENDED RELEASE ORAL 2 TIMES DAILY
Status: COMPLETED | OUTPATIENT
Start: 2018-01-31 | End: 2018-02-01

## 2018-01-31 RX ADMIN — ALBUTEROL SULFATE 2.5 MG: 2.5 SOLUTION RESPIRATORY (INHALATION) at 11:28

## 2018-01-31 RX ADMIN — ALBUTEROL SULFATE 2.5 MG: 2.5 SOLUTION RESPIRATORY (INHALATION) at 19:04

## 2018-01-31 RX ADMIN — OSELTAMIVIR PHOSPHATE 75 MG: 75 CAPSULE ORAL at 20:59

## 2018-01-31 RX ADMIN — BENZONATATE 100 MG: 100 CAPSULE ORAL at 05:16

## 2018-01-31 RX ADMIN — OMEPRAZOLE 20 MG: 20 CAPSULE, DELAYED RELEASE ORAL at 08:49

## 2018-01-31 RX ADMIN — PREDNISONE 20 MG: 20 TABLET ORAL at 08:49

## 2018-01-31 RX ADMIN — ALBUTEROL SULFATE 2.5 MG: 2.5 SOLUTION RESPIRATORY (INHALATION) at 15:05

## 2018-01-31 RX ADMIN — OSELTAMIVIR PHOSPHATE 75 MG: 75 CAPSULE ORAL at 08:49

## 2018-01-31 RX ADMIN — BUDESONIDE 0.5 MG: 0.5 SUSPENSION RESPIRATORY (INHALATION) at 07:44

## 2018-01-31 RX ADMIN — GUAIFENESIN AND DEXTROMETHORPHAN 5 ML: 100; 10 SYRUP ORAL at 23:24

## 2018-01-31 RX ADMIN — GUAIFENESIN AND DEXTROMETHORPHAN 5 ML: 100; 10 SYRUP ORAL at 15:14

## 2018-01-31 RX ADMIN — ENOXAPARIN SODIUM 30 MG: 100 INJECTION SUBCUTANEOUS at 21:00

## 2018-01-31 RX ADMIN — ENOXAPARIN SODIUM 30 MG: 100 INJECTION SUBCUTANEOUS at 08:50

## 2018-01-31 RX ADMIN — GUAIFENESIN AND DEXTROMETHORPHAN 5 ML: 100; 10 SYRUP ORAL at 08:49

## 2018-01-31 RX ADMIN — ALBUTEROL SULFATE 2.5 MG: 2.5 SOLUTION RESPIRATORY (INHALATION) at 07:44

## 2018-01-31 RX ADMIN — POTASSIUM CHLORIDE 40 MEQ: 1500 TABLET, EXTENDED RELEASE ORAL at 20:59

## 2018-01-31 RX ADMIN — BENZONATATE 100 MG: 100 CAPSULE ORAL at 20:59

## 2018-01-31 RX ADMIN — ESCITALOPRAM OXALATE 20 MG: 10 TABLET ORAL at 20:59

## 2018-01-31 RX ADMIN — BUDESONIDE 0.5 MG: 0.5 SUSPENSION RESPIRATORY (INHALATION) at 19:04

## 2018-01-31 RX ADMIN — BENZONATATE 100 MG: 100 CAPSULE ORAL at 11:40

## 2018-01-31 ASSESSMENT — ENCOUNTER SYMPTOMS
COUGH: 1
VOMITING: 0
ABDOMINAL PAIN: 0
FEVER: 0
WHEEZING: 1
HEADACHES: 0
CHILLS: 0
MYALGIAS: 0
PALPITATIONS: 0
NAUSEA: 0
DIZZINESS: 0
SHORTNESS OF BREATH: 1

## 2018-01-31 ASSESSMENT — PAIN SCALES - GENERAL
PAINLEVEL_OUTOF10: 0
PAINLEVEL_OUTOF10: 0

## 2018-01-31 NOTE — CARE PLAN
Problem: Oxygenation:  Goal: Maintain adequate oxygenation dependent on patient condition  Outcome: PROGRESSING AS EXPECTED  Monitor oxygenation for SpO2 >90%  Intervention: Manage oxygen therapy by monitoring pulse oximetry and/or ABG values  Oxygen is currently required at 2 lpm nasal cannula for SpO2 >90%      Problem: Bronchoconstriction:  Goal: Improve in air movement and diminished wheezing  Outcome: PROGRESSING AS EXPECTED  Patient does claim a benefit from bronchodilator therapy. There was a increase in aeration and a decrease in wheezes heard  Intervention: Implement inhaled treatments  Patient is receiving Albuterol Qid and Pulmicort BID.  Intervention: Evaluate and manage medication effects  Patient will be evaluated before and after medication delivery to assess effectiveness of therapy. Currently there seems to be both subjective and objective improvement.

## 2018-01-31 NOTE — FLOWSHEET NOTE
01/30/18 1926   Events/Summary/Plan   Events/Summary/Plan SVN    Interdisciplinary Plan of Care-Goals (Indications)   Obstructive Ventilatory Defect or Pulmonary Disease without Obvious Obstruction History / Diagnosis   Interdisciplinary Plan of Care-Outcomes    Bronchodilator Outcome Patient at Stable Baseline   Education   Education Yes - Pt. / Family has been Instructed in use of Respiratory Equipment;Yes - Pt. / Family has been Instructed in use of Respiratory Medications and Adverse Reactions   RT Assessment of Delivered Medications   Evaluation of Medication Delivery Daily Yes-- Pt /Family has been Instructed in use of Respiratory Medications and Adverse Reactions   SVN Group   #SVN Performed Yes   Given By: Mouthpiece   Respiratory WDL   Respiratory (WDL) X   Chest Exam   Respiration 18   Pulse 86   Breath Sounds   Pre/Post Intervention Post Intervention Assessment   RUL Breath Sounds Clear;Diminished   RML Breath Sounds Clear;Diminished   RLL Breath Sounds Diminished   AYDEN Breath Sounds Clear;Diminished   LLL Breath Sounds Diminished   Oximetry   #Pulse Oximetry (Single Determination) Yes   Oxygen   Pulse Oximetry 95 %   O2 (LPM) 1   O2 Daily Delivery Respiratory  Silicone Nasal Cannula

## 2018-01-31 NOTE — PROGRESS NOTES
Pt reports feeling improved overall, but continues to experience increased shortness of breath with exertion. Discussed plan to ambulate hallway and evaluate need for home O2. Pt agrees with the plan. RT protocol in place.

## 2018-01-31 NOTE — PROGRESS NOTES
Tele Summary    Rhythm: Sinus rhythm, sinus bradycardia, sinus tachycardia  Ectopy: Rare PVCs  HR: 50s-100s  MT: 0.14  QRS: 0.08  QT: 0.38    All further monitoring will be done by pt's primary RN.

## 2018-01-31 NOTE — FLOWSHEET NOTE
01/30/18 2210   Events/Summary/Plan   Events/Summary/Plan on bipap   General Vent Information   Pulse Oximetry 94 %   CPAP/BiPAP [facility]   Nocturnal CPAP or BiPAP CPAP   #System Evaluation Yes   Home Unit Used? Yes   Equipment Inspected for Cleanliness, Operation, and Safety? Yes   Settings (If Known) 9   FiO2 or LPM 2   Home Mask Used? Yes

## 2018-01-31 NOTE — FLOWSHEET NOTE
01/31/18 1130   Events/Summary/Plan   Events/Summary/Plan found pt on bipap, svn given   Interdisciplinary Plan of Care-Goals (Indications)   Obstructive Ventilatory Defect or Pulmonary Disease without Obvious Obstruction History / Diagnosis   Interdisciplinary Plan of Care-Outcomes    Bronchodilator Outcome Patient at Stable Baseline   SVN Group   #SVN Performed Yes   Given By: Mouthpiece   Respiratory WDL   Respiratory (WDL) X   Chest Exam   Work Of Breathing / Effort Mild   Respiration 20  (post 20)   Pulse 70  (post 61)   Heart Rate (Monitored) 70   Breath Sounds   Pre/Post Intervention Pre Intervention Assessment  (increased expiratory wheezes afters svn, bases remain dim)   RUL Breath Sounds Expiratory Wheezes   RML Breath Sounds Diminished   RLL Breath Sounds Diminished   AYDEN Breath Sounds Expiratory Wheezes   LLL Breath Sounds Diminished   Oximetry   #Pulse Oximetry (Single Determination) Yes   Oxygen   Pulse Oximetry 92 %   O2 (LPM) 1.5   O2 Daily Delivery Respiratory  (02 line bleed into home unit)

## 2018-01-31 NOTE — PROGRESS NOTES
Pt ambulated down hallway and back to assess need for home O2. Pt very short of breath with ambulation, O2 dropped to 86% on room air but improved to 96% on 2L. Droplet precautions maintained.

## 2018-01-31 NOTE — CARE PLAN
Problem: Oxygenation:  Goal: Maintain adequate oxygenation dependent on patient condition  Outcome: PROGRESSING AS EXPECTED  Patient on 1LNC       Problem: Bronchoconstriction:  Goal: Improve in air movement and diminished wheezing  Outcome: PROGRESSING AS EXPECTED  Patient is at baseline for her RR and BBS.     Problem: Ventilation Defect:  Goal: Ability to achieve and maintain unassisted ventilation or tolerate decreased levels of ventilator support  Outcome: PROGRESSING AS EXPECTED  Uses Home CPAP NOC

## 2018-01-31 NOTE — PROGRESS NOTES
EKG Rhythm Strip    GA Interval: 0.14  QRS Interval: 0.08  QT Interval: 0.42  Rhythm Interpretation: SR,SB,ST.    Ectopy: R-PVC.

## 2018-01-31 NOTE — CARE PLAN
Problem: Respiratory:  Goal: Respiratory status will improve  Outcome: PROGRESSING SLOWER THAN EXPECTED  Home O2 requirements assessed and charted in doc flowsheet. Encouraged ambulation, cough, and deep breathing.     Problem: Mobility  Goal: Risk for activity intolerance will decrease  Outcome: PROGRESSING SLOWER THAN EXPECTED  Pt assisted with ambulating hallway, droplet precautions maintained. Pt with increased SOB on exertion.

## 2018-01-31 NOTE — PROGRESS NOTES
Occasional coughing spells requesting for rescue inhalers informed non ordered offered to call resp.therapy but refused. Doing deep breathing and requested for amaya tiff to sooth her throat.Monitoring closely for increase resp.distress.

## 2018-02-01 VITALS
HEIGHT: 70 IN | WEIGHT: 293 LBS | HEART RATE: 72 BPM | SYSTOLIC BLOOD PRESSURE: 123 MMHG | OXYGEN SATURATION: 93 % | RESPIRATION RATE: 20 BRPM | TEMPERATURE: 98.8 F | BODY MASS INDEX: 41.95 KG/M2 | DIASTOLIC BLOOD PRESSURE: 75 MMHG

## 2018-02-01 PROBLEM — J45.901 ASTHMA EXACERBATION: Status: RESOLVED | Noted: 2017-11-24 | Resolved: 2018-02-01

## 2018-02-01 PROBLEM — J10.1 INFLUENZA A: Status: RESOLVED | Noted: 2018-01-29 | Resolved: 2018-02-01

## 2018-02-01 LAB
ALBUMIN SERPL BCP-MCNC: 3.7 G/DL (ref 3.2–4.9)
ALP SERPL-CCNC: 70 U/L (ref 30–99)
ALT SERPL-CCNC: 80 U/L (ref 2–50)
ANION GAP SERPL CALC-SCNC: 8 MMOL/L (ref 0–11.9)
AST SERPL-CCNC: 47 U/L (ref 12–45)
BILIRUB CONJ SERPL-MCNC: 0.1 MG/DL (ref 0.1–0.5)
BILIRUB INDIRECT SERPL-MCNC: 0.3 MG/DL (ref 0–1)
BILIRUB SERPL-MCNC: 0.4 MG/DL (ref 0.1–1.5)
BUN SERPL-MCNC: 7 MG/DL (ref 8–22)
CALCIUM SERPL-MCNC: 8.8 MG/DL (ref 8.4–10.2)
CHLORIDE SERPL-SCNC: 107 MMOL/L (ref 96–112)
CO2 SERPL-SCNC: 23 MMOL/L (ref 20–33)
CREAT SERPL-MCNC: 0.95 MG/DL (ref 0.5–1.4)
GLUCOSE SERPL-MCNC: 97 MG/DL (ref 65–99)
POTASSIUM SERPL-SCNC: 3.3 MMOL/L (ref 3.6–5.5)
PROT SERPL-MCNC: 6.2 G/DL (ref 6–8.2)
SODIUM SERPL-SCNC: 138 MMOL/L (ref 135–145)

## 2018-02-01 PROCEDURE — 700101 HCHG RX REV CODE 250: Performed by: HOSPITALIST

## 2018-02-01 PROCEDURE — 99239 HOSP IP/OBS DSCHRG MGMT >30: CPT | Performed by: HOSPITALIST

## 2018-02-01 PROCEDURE — 700111 HCHG RX REV CODE 636 W/ 250 OVERRIDE (IP): Performed by: HOSPITALIST

## 2018-02-01 PROCEDURE — A9270 NON-COVERED ITEM OR SERVICE: HCPCS | Performed by: HOSPITALIST

## 2018-02-01 PROCEDURE — 80076 HEPATIC FUNCTION PANEL: CPT

## 2018-02-01 PROCEDURE — 700102 HCHG RX REV CODE 250 W/ 637 OVERRIDE(OP): Performed by: HOSPITALIST

## 2018-02-01 PROCEDURE — 36415 COLL VENOUS BLD VENIPUNCTURE: CPT

## 2018-02-01 PROCEDURE — 94660 CPAP INITIATION&MGMT: CPT

## 2018-02-01 PROCEDURE — A9270 NON-COVERED ITEM OR SERVICE: HCPCS | Performed by: INTERNAL MEDICINE

## 2018-02-01 PROCEDURE — 700102 HCHG RX REV CODE 250 W/ 637 OVERRIDE(OP): Performed by: INTERNAL MEDICINE

## 2018-02-01 PROCEDURE — 80048 BASIC METABOLIC PNL TOTAL CA: CPT

## 2018-02-01 PROCEDURE — 700111 HCHG RX REV CODE 636 W/ 250 OVERRIDE (IP): Performed by: INTERNAL MEDICINE

## 2018-02-01 PROCEDURE — 94640 AIRWAY INHALATION TREATMENT: CPT

## 2018-02-01 RX ORDER — POTASSIUM CHLORIDE 20 MEQ/1
40 TABLET, EXTENDED RELEASE ORAL ONCE
Status: DISCONTINUED | OUTPATIENT
Start: 2018-02-01 | End: 2018-02-01 | Stop reason: HOSPADM

## 2018-02-01 RX ORDER — OSELTAMIVIR PHOSPHATE 75 MG/1
75 CAPSULE ORAL EVERY 12 HOURS
Qty: 3 CAP | Refills: 0 | Status: SHIPPED | OUTPATIENT
Start: 2018-02-01 | End: 2018-02-03

## 2018-02-01 RX ORDER — PREDNISONE 10 MG/1
10 TABLET ORAL DAILY
Qty: 30 TAB | Refills: 0
Start: 2018-02-01 | End: 2018-03-03

## 2018-02-01 RX ADMIN — BENZONATATE 100 MG: 100 CAPSULE ORAL at 12:22

## 2018-02-01 RX ADMIN — OSELTAMIVIR PHOSPHATE 75 MG: 75 CAPSULE ORAL at 09:15

## 2018-02-01 RX ADMIN — ALBUTEROL SULFATE 2.5 MG: 2.5 SOLUTION RESPIRATORY (INHALATION) at 12:02

## 2018-02-01 RX ADMIN — OMEPRAZOLE 20 MG: 20 CAPSULE, DELAYED RELEASE ORAL at 09:15

## 2018-02-01 RX ADMIN — ALBUTEROL SULFATE 2.5 MG: 2.5 SOLUTION RESPIRATORY (INHALATION) at 07:33

## 2018-02-01 RX ADMIN — ENOXAPARIN SODIUM 30 MG: 100 INJECTION SUBCUTANEOUS at 09:17

## 2018-02-01 RX ADMIN — POTASSIUM CHLORIDE 40 MEQ: 1500 TABLET, EXTENDED RELEASE ORAL at 09:16

## 2018-02-01 RX ADMIN — BENZONATATE 100 MG: 100 CAPSULE ORAL at 03:47

## 2018-02-01 RX ADMIN — PREDNISONE 20 MG: 20 TABLET ORAL at 09:16

## 2018-02-01 RX ADMIN — BUDESONIDE 0.5 MG: 0.5 SUSPENSION RESPIRATORY (INHALATION) at 08:00

## 2018-02-01 RX ADMIN — GUAIFENESIN AND DEXTROMETHORPHAN 5 ML: 100; 10 SYRUP ORAL at 09:14

## 2018-02-01 ASSESSMENT — PAIN SCALES - GENERAL: PAINLEVEL_OUTOF10: 0

## 2018-02-01 ASSESSMENT — PATIENT HEALTH QUESTIONNAIRE - PHQ9
SUM OF ALL RESPONSES TO PHQ9 QUESTIONS 1 AND 2: 0
1. LITTLE INTEREST OR PLEASURE IN DOING THINGS: NOT AT ALL
2. FEELING DOWN, DEPRESSED, IRRITABLE, OR HOPELESS: NOT AT ALL
SUM OF ALL RESPONSES TO PHQ QUESTIONS 1-9: 0

## 2018-02-01 NOTE — FLOWSHEET NOTE
02/01/18 0700   Events/Summary/Plan   Non-Invasive Resp Device Site Inspection Completed Intact   Interdisciplinary Plan of Care-Goals (Indications)   Obstructive Ventilatory Defect or Pulmonary Disease without Obvious Obstruction History / Diagnosis   Interdisciplinary Plan of Care-Outcomes    Bronchodilator Outcome Diminished Wheezing and Volume of Air Movement Increased   Education   Education Yes - Pt. / Family has been Instructed in use of Respiratory Medications and Adverse Reactions   RT Assessment of Delivered Medications   Evaluation of Medication Delivery Daily Yes-- Pt /Family has been Instructed in use of Respiratory Medications and Adverse Reactions   SVN Group   #SVN Performed Yes   Given By: Mouthpiece   Date SVN Next Change Due (Q 7 Days) 02/04/18   Incentive Spirometry Group   Incentive Spirometry Instruction Yes   Incentive Spirometer Volume 2000 mL   Incentive Spirometer Date Last Changed 02/01/18   Incentive Spirometer Next Change Date (Q 30 Days) 03/03/18   Chest Exam   Work Of Breathing / Effort Mild   Respiration 20   Pulse 72   Breath Sounds   RUL Breath Sounds Expiratory Wheezes   RML Breath Sounds Diminished   RLL Breath Sounds Diminished   AYDEN Breath Sounds Clear   LLL Breath Sounds Diminished   Secretions   Cough Dry;Non Productive   How Sputum Obtained Cough on Request   Oxygen   Pulse Oximetry 93 %   O2 (LPM) 1.5   O2 Daily Delivery Respiratory  Nasal Cannula

## 2018-02-01 NOTE — DISCHARGE INSTRUCTIONS
Discharge Instructions    F/u with PCP as scheduled    Discharged to home by car with self. Discharged via wheelchair, hospital escort: Yes.  Special equipment needed: Oxygen    Be sure to schedule a follow-up appointment with your primary care doctor or any specialists as instructed.     Discharge Plan:   Diet Plan: Discussed  Activity Level: Discussed  Confirmed Follow up Appointment: Patient to Call and Schedule Appointment  Confirmed Symptoms Management: Discussed  Medication Reconciliation Updated: Yes  Influenza Vaccine Indication: Not indicated: Previously immunized this influenza season and > 8 years of age    I understand that a diet low in cholesterol, fat, and sodium is recommended for good health. Unless I have been given specific instructions below for another diet, I accept this instruction as my diet prescription.   Other diet: diabetic    Special Instructions: None    · Is patient discharged on Warfarin / Coumadin?   No         Oxygen Use at Home  Oxygen can be prescribed for home use. The prescription will show the flow rate. This is how much oxygen is to be used per minute. This will be listed in liters per minute (LPM or L/M). A liter is a metric measurement of volume.  You will use oxygen therapy as directed. It can be used while exercising, sleeping, or at rest. You may need oxygen continuously. Your health care provider may order a blood oxygen test (arterial blood gas or pulse oximetry test) that will show what your oxygen level is. Your health care provider will use these measurements to learn about your needs and follow your progress.  Home oxygen therapy is commonly used on patients with various lung (pulmonary) related conditions. Some of these conditions include:  · Asthma.  · Lung cancer.  · Pneumonia.  · Emphysema.  · Chronic bronchitis.  · Cystic fibrosis.  · Other lung diseases.  · Pulmonary fibrosis.  · Occupational lung disease.  · Heart failure.  · Chronic obstructive pulmonary  disease (COPD).  3 COMMON WAYS OF PROVIDING OXYGEN THERAPY  · Gas: The gas form of oxygen is put into variously sized cylinders or tanks. The cylinders or oxygen tanks contain compressed oxygen. The cylinder is equipped with a regulator that controls the flow rate. Because the flow of oxygen out of the cylinder is constant, an oxygen conserving device may be attached to the system to avoid waste. This device releases the gas only when you inhale and cuts it off when you exhale. Oxygen can be provided in a small cylinder that can be carried with you. Large tanks are heavy and are only for stationary use. After use, empty tanks must be exchanged for full tanks.  · Liquid: The liquid form of oxygen is put into a container similar to a thermos. When released, the liquid converts to a gas and you breathe it in just like the compressed gas. This storage method takes up less space than the compressed gas cylinder, and you can transfer the liquid to a small, portable vessel at home. Liquid oxygen is more expensive than the compressed gas, and the vessel vents when not in use. An oxygen conserving device may be built into the vessel to conserve the oxygen. Liquid oxygen is very cold, around 297° below zero.  · Oxygen concentrator: This medical device filters oxygen from room air and gives almost 100% oxygen to the patient. Oxygen concentrators are powered by electricity. Benefits of this system are:  ¨ It does not need to be resupplied.  ¨ It is not as costly as liquid oxygen.  ¨ Extra tubing permits the user to move around easier.  There are several types of small, portable oxygen systems available which can help you remain active and mobile. You must have a cylinder of oxygen as a backup in the event of a power failure. Advise your electric power company that you are on oxygen therapy in order to get priority service when there is a power failure.  OXYGEN DELIVERY DEVICES  There are 3 common ways to deliver oxygen to your  "body.  · Nasal cannula. This is a 2-pronged device inserted in the nostrils that is connected to tubing carrying the oxygen. The tubing can rest on the ears or be attached to the frame of eyeglasses.  · Mask. People who need a high flow of oxygen generally use a mask.  · Transtracheal catheter. Transtracheal oxygen therapy requires the insertion of a small, flexible tube (catheter) in the windpipe (trachea). This catheter is held in place by a necklace. Since transtracheal oxygen bypasses the mouth, nose, and throat, a humidifier is absolutely required at flow rates of 1 LPM or greater.  OXYGEN USE SAFETY TIPS  · Never smoke while using oxygen. Oxygen does not burn or explode, but flammable materials will burn faster in the presence of oxygen.  · Keep a fire extinguisher close by. Let your fire department know that you have oxygen in your home.  · Warn visitors not to smoke near you when you are using oxygen. Put up \"no smoking\" signs in your home where you most often use the oxygen.  · When you go to a restaurant with your portable oxygen source, ask to be seated in the nonsmoking section.  · Stay at least 5 feet away from gas stoves, candles, lighted fireplaces, or other heat sources.  · Do not use materials that burn easily (flammable) while using your oxygen.  · If you use an oxygen cylinder, make sure it is secured to some fixed object or in a stand. If you use liquid oxygen, make sure the vessel is kept upright to keep the oxygen from pouring out. Liquid oxygen is so cold it can hurt your skin.  · If you use an oxygen concentrator, call your electric company so you will be given priority service if your power goes out. Avoid using extension cords, if possible.  · Regularly test your smoke detectors at home to make sure they work. If you receive care in your home from a nurse or other health care provider, he or she may also check to make sure your smoke detectors work.  GUIDELINES FOR CLEANING YOUR " EQUIPMENT  · Wash the nasal prongs with a liquid soap. Thoroughly rinse them once or twice a week.  · Replace the prongs every 2 to 4 weeks. If you have an infection (cold, pneumonia) change them when you are well.  · Your health care provider will give you instructions on how to clean your transtracheal catheter.  · The humidifier bottle should be washed with soap and warm water and rinsed thoroughly between each refill. Air-dry the bottle before filling it with sterile or distilled water. The bottle and its top should be disinfected after they are cleaned.  · If you use an oxygen concentrator, unplug the unit. Then wipe down the cabinet with a damp cloth and dry it daily. The air filter should be cleaned at least twice a week.  · Follow your home medical equipment and service company's directions for cleaning the compressor filter.  HOME CARE INSTRUCTIONS   · Do not change the flow of oxygen unless directed by your health care provider.  · Do not use alcohol or other sedating drugs unless instructed. They slow your breathing rate.  · Do not use materials that burn easily (flammable) while using your oxygen.  · Always keep a spare tank of oxygen. Plan ahead for holidays when you may not be able to get a prescription filled.  · Use water-based lubricants on your lips or nostrils. Do not use an oil-based product like petroleum jelly.  · To prevent your cheeks or the skin behind your ears from becoming irritated, tuck some gauze under the tubing.  · If you have persistent redness under your nose, call your health care provider.  · When you no longer need oxygen, your doctor will have the oxygen discontinued. Oxygen is not addicting or habit forming.  · Use the oxygen as instructed. Too much oxygen can be harmful and too little will not give you the benefit you need.  · Shortness of breath is not always from a lack of oxygen. If your oxygen level is not the cause of your shortness of breath, taking oxygen will not  "help.  SEEK MEDICAL CARE IF:   · You have frequent headaches.  · You have shortness of breath or a lasting cough.  · You have anxiety.  · You are confused.  · You are drowsy or sleepy all the time.  · You develop an illness which aggravates your breathing.  · You cannot exercise.  · You are restless.  · You have blue lips or fingernails.  · You have difficult or irregular breathing and it is getting worse.  · You have a fever.     This information is not intended to replace advice given to you by your health care provider. Make sure you discuss any questions you have with your health care provider.     Document Released: 03/09/2005 Document Revised: 01/08/2016 Document Reviewed: 07/30/2014  [employer] Interactive Patient Education ©2016 [employer]      Influenza, Adult  Influenza (\"the flu\") is a viral infection of the respiratory tract. It occurs more often in winter months because people spend more time in close contact with one another. Influenza can make you feel very sick. Influenza easily spreads from person to person (contagious).  CAUSES   Influenza is caused by a virus that infects the respiratory tract. You can catch the virus by breathing in droplets from an infected person's cough or sneeze. You can also catch the virus by touching something that was recently contaminated with the virus and then touching your mouth, nose, or eyes.  RISKS AND COMPLICATIONS  You may be at risk for a more severe case of influenza if you smoke cigarettes, have diabetes, have chronic heart disease (such as heart failure) or lung disease (such as asthma), or if you have a weakened immune system. Elderly people and pregnant women are also at risk for more serious infections. The most common problem of influenza is a lung infection (pneumonia). Sometimes, this problem can require emergency medical care and may be life threatening.  SIGNS AND SYMPTOMS   Symptoms typically last 4 to 10 days and may " include:  · Fever.  · Chills.  · Headache, body aches, and muscle aches.  · Sore throat.  · Chest discomfort and cough.  · Poor appetite.  · Weakness or feeling tired.  · Dizziness.  · Nausea or vomiting.  DIAGNOSIS   Diagnosis of influenza is often made based on your history and a physical exam. A nose or throat swab test can be done to confirm the diagnosis.  TREATMENT   In mild cases, influenza goes away on its own. Treatment is directed at relieving symptoms. For more severe cases, your health care provider may prescribe antiviral medicines to shorten the sickness. Antibiotic medicines are not effective because the infection is caused by a virus, not by bacteria.  HOME CARE INSTRUCTIONS  · Take medicines only as directed by your health care provider.  · Use a cool mist humidifier to make breathing easier.  · Get plenty of rest until your temperature returns to normal. This usually takes 3 to 4 days.  · Drink enough fluid to keep your urine clear or pale yellow.  · Cover your mouth and nose when coughing or sneezing, and wash your hands well to prevent the virus from spreading.  · Stay home from work or school until the fever is gone for at least 1 full day.  PREVENTION   An annual influenza vaccination (flu shot) is the best way to avoid getting influenza. An annual flu shot is now routinely recommended for all adults in the [place]  SEEK MEDICAL CARE IF:  · You experience chest pain, your cough worsens, or you produce more mucus.  · You have nausea, vomiting, or diarrhea.  · Your fever returns or gets worse.  SEEK IMMEDIATE MEDICAL CARE IF:  · You have trouble breathing, you become short of breath, or your skin or nails become bluish.  · You have severe pain or stiffness in the neck.  · You develop a sudden headache, or pain in the face or ear.  · You have nausea or vomiting that you cannot control.  MAKE SURE YOU:   · Understand these instructions.  · Will watch your condition.  · Will get help right away if you  are not doing well or get worse.                                                                                          Depression/ Suicide       This information is not intended to replace advice given to you by your health care provider. Make sure you discuss any questions you have with your health care provider.     Document Released: 12/15/2001 Document Revised: 01/08/2016 Document Reviewed: 03/18/2013  [employer] Interactive Patient Education ©2016 [employer]    As you are discharged from this [facility] facility, it is important to learn how to keep safe from harming yourself.    Recognize the warning signs:  · Abrupt changes in personality, positive or negative- including increase in energy   · Giving away possessions  · Change in eating patterns- significant weight changes-  positive or negative  · Change in sleeping patterns- unable to sleep or sleeping all the time   · Unwillingness or inability to communicate  · Depression  · Unusual sadness, discouragement and loneliness  · Talk of wanting to die  · Neglect of personal appearance   · Rebelliousness- reckless behavior  · Withdrawal from people/activities they love  · Confusion- inability to concentrate     If you or a loved one observes any of these behaviors or has concerns about self-harm, here's what you can do:  · Talk about it- your feelings and reasons for harming yourself  · Remove any means that you might use to hurt yourself (examples: pills, rope, extension cords, firearm)  · Get professional help from the community (Mental Health, Substance Abuse, psychological counseling)  · Do not be alone:Call your Safe Contact- someone whom you trust who will be there for you.  · Call your local CRISIS HOTLINE 465-1985 or 513-243-7898  · Call your local Children's Mobile Crisis Response Team [place] (410) 728-7343 or www.[employer]  · Call the toll free National Suicide Prevention Hotlines   · National Suicide Prevention Lifeline  969-113-VMED (5543)  · National [place] Line Network 800-SUICIDE (624-1756)

## 2018-02-01 NOTE — CARE PLAN
Problem: Respiratory:  Goal: Respiratory status will improve    Intervention: Educate and encourage incentive spirometry usage  Educated and encouraged patient to frequently and properly use the incentive spirometer; patient demonstrated proper use.       Problem: Mobility  Goal: Risk for activity intolerance will decrease    Intervention: Assess and monitor signs of activity intolerance  Home oxygen trial performed on patient during ambulation down taylor; patient tired quickly.

## 2018-02-01 NOTE — PROGRESS NOTES
[facility]ist Progress Note    Date of Service: 2018    Chief Complaint  52 y.o. morbidly obese diabetic female admitted 2018 with persistent wheezing and shortness of breath. Influenza screen was A positive.    Interval Problem Update  Feels better today, desatted with ambulation last night, will walk again today  May need to go home with O2    Consultants/Specialty  None    Disposition  Likely home tomorrow, with or without home O2    Code:  FULL      Review of Systems   Constitutional: Negative for chills and fever.   Respiratory: Positive for cough, shortness of breath (better today but still with exertion) and wheezing (minimal today).    Cardiovascular: Negative for chest pain and palpitations.   Gastrointestinal: Negative for abdominal pain, nausea and vomiting.   Genitourinary: Negative for dysuria.   Musculoskeletal: Negative for myalgias.   Neurological: Negative for dizziness and headaches.      Physical Exam  Laboratory/Imaging   Hemodynamics  Temp (24hrs), Av.6 °C (97.8 °F), Min:36.5 °C (97.7 °F), Max:36.7 °C (98 °F)   Temperature: 36.5 °C (97.7 °F)  Pulse  Av.7  Min: 58  Max: 88 Heart Rate (Monitored): 64  Blood Pressure: 138/70      Respiratory      Respiration: 20, Pulse Oximetry: 93 %, O2 Daily Delivery Respiratory : Nasal Cannula     Given By:: Mouthpiece, Work Of Breathing / Effort: Mild  RUL Breath Sounds: Expiratory Wheezes, RML Breath Sounds: Diminished, RLL Breath Sounds: Diminished, AYDEN Breath Sounds: Expiratory Wheezes, LLL Breath Sounds: Diminished    Fluids    Intake/Output Summary (Last 24 hours) at 18 1808  Last data filed at 18 0845   Gross per 24 hour   Intake              340 ml   Output                0 ml   Net              340 ml       Nutrition  Orders Placed This Encounter   Procedures   • Diet Order     Standing Status:   Standing     Number of Occurrences:   1     Order Specific Question:   Diet:     Answer:   Diabetic [3]     Physical Exam    Constitutional: She is oriented to person, place, and time. She appears well-developed and well-nourished.   Morbidly obese   Cardiovascular: Normal rate, regular rhythm and normal heart sounds.    No murmur heard.  Pulmonary/Chest: No respiratory distress (mild). She has wheezes (only end expiratory now). She has no rales.   Abdominal: Soft. Bowel sounds are normal. She exhibits no distension. There is no tenderness.   Musculoskeletal: Normal range of motion. She exhibits no edema.   Neurological: She is alert and oriented to person, place, and time.   Skin: Skin is warm and dry. No erythema.   Nursing note and vitals reviewed.      Recent Labs      01/28/18 1954 01/29/18 0424 01/31/18   0510   WBC  4.4*  3.4*  3.4*   RBC  4.16*  3.87*  3.83*   HEMOGLOBIN  12.5  11.4*  11.5*   HEMATOCRIT  37.2  35.5*  34.7*   MCV  89.4  91.7  90.6   MCH  30.0  29.5  30.0   MCHC  33.6  32.1*  33.1*   RDW  54.5*  57.1*  54.8*   PLATELETCT  242  210  232   MPV  9.3  9.8  9.8     Recent Labs      01/28/18 1954 01/29/18 0424 01/31/18   0510   SODIUM  140  141  136   POTASSIUM  3.4*  3.3*  3.2*   CHLORIDE  104  107  106   CO2  26  24  24   GLUCOSE  111*  110*  106*   BUN  8  5*  7*   CREATININE  1.07  0.95  0.91   CALCIUM  9.0  8.3*  8.6                      Assessment/Plan     * Asthma exacerbation- (present on admission)   Assessment & Plan    -2/2 influenza  -continue steroids for significant wheezing, tight airways; improved        Influenza A- (present on admission)   Assessment & Plan    -continue Tamiflu, droplet iso        Acute respiratory failure with hypoxia (CMS-HCC)- (present on admission)   Assessment & Plan    -2/2 asthma and influenza  -will redo home O2 eval today, likely will need home O2  -RT protocol        Hypokalemia- (present on admission)   Assessment & Plan    -replacing, check in am  -mag good        Transaminitis- (present on admission)   Assessment & Plan    -Likely 2/2 steatosis/ DM/  Obesity  -Hep negative; fatty infiltration on US        Sleep apnea- (present on admission)   Assessment & Plan    -Compliant with CPAP          Leukopenia- (present on admission)   Assessment & Plan    -probably 2/2 illness; stable at 3.4  -baseline normal        DM (diabetes mellitus) (CMS-HCC)- (present on admission)   Assessment & Plan    -Hold oral and home injected medication.  -Accu-Cheks every before meals and at bedtime, low-dose regular insulin sliding scale.  -sugars have been fine        BMI 45.0-49.9, adult (CMS-HCC)- 45.7- (present on admission)   Assessment & Plan    -encourage weight loss  -likely contributes to her pulmonary status            Reviewed items::  Labs reviewed, Medications reviewed and Radiology images reviewed (no infiltrates)  Menon catheter::  No Menon  DVT prophylaxis pharmacological::  Enoxaparin (Lovenox)  Ulcer Prophylaxis::  Yes (chronic medication)  Antibiotics:  Treating active infection/contamination beyond 24 hours perioperative coverage (antiviral)

## 2018-02-01 NOTE — FLOWSHEET NOTE
02/01/18 1150   Events/Summary/Plan   Non-Invasive Resp Device Site Inspection Completed Intact   Interdisciplinary Plan of Care-Goals (Indications)   Obstructive Ventilatory Defect or Pulmonary Disease without Obvious Obstruction History / Diagnosis   Interdisciplinary Plan of Care-Outcomes    Bronchodilator Outcome Diminished Wheezing and Volume of Air Movement Increased   Education   Education Yes - Pt. / Family has been Instructed in use of Respiratory Medications and Adverse Reactions   RT Assessment of Delivered Medications   #Demo/Eval outside of SVN, MDI'S Yes   SVN Group   #SVN Performed Yes   Given By: Mouthpiece   Date SVN Next Change Due (Q 7 Days) 02/04/18   Chest Exam   Work Of Breathing / Effort Mild   Respiration 20   Pulse 68   Breath Sounds   RUL Breath Sounds Expiratory Wheezes   RML Breath Sounds Diminished   RLL Breath Sounds Diminished   AYDEN Breath Sounds Clear   LLL Breath Sounds Diminished   Secretions   Cough Dry   How Sputum Obtained Cough on Request

## 2018-02-01 NOTE — PROGRESS NOTES
Tele Summary @1886    Rhythm : Sinus Bradycardia  Ectopy : none  HR : 56  TN : 0.18  QRS : 0.08  QT : 0.46    Any further monitoring will be done by patient's Primary Nurse.

## 2018-02-01 NOTE — DISCHARGE PLANNING
Care Transition Team Assessment    SW intern met with patient at bedside. Patient plans to discharge home alone with help from family and friends. [facility] and signed choice form for O2. Referral form sent to [facility] [phone] for follow up. Patient has no questions or concerns at this time.     Information Source  Orientation : Oriented x 4  Information Given By: Patient  Informant's Name: Marva  Who is responsible for making decisions for patient? : Patient    Readmission Evaluation  Is this a readmission?: No    Elopement Risk  Legal Hold: No  Ambulatory or Self Mobile in Wheelchair: Yes  Disoriented: No  Psychiatric Symptoms: None  History of Wandering: No  Elopement this Admit: No  Vocalizing Wanting to Leave: No  Displays Behaviors, Body Language Wanting to Leave: No-Not at Risk for Elopement  Elopement Risk: Not at Risk for Elopement    Interdisciplinary Discharge Planning  Does Admitting Nurse Feel This Could be a Complex Discharge?: No  Primary Care Physician: NO PCP  Support Systems: Family Member(s)  Housing / Facility: 1 Story Apartment / Condo  Mobility Issues: No    Discharge Preparedness  What is your plan after discharge?: Home with help  What are your discharge supports?: Other (comment) (SUPPORT FROM FAM/FRIENDS)  Prior Functional Level: Independent with Activities of Daily Living  Difficulity with ADLs: None  Difficulity with IADLs: None    Functional Assesment  Prior Functional Level: Independent with Activities of Daily Living    Finances  Financial Barriers to Discharge: No  Prescription Coverage: Yes    Vision / Hearing Impairment  Vision Impairment : Yes  Right Eye Vision: Wears Glasses  Left Eye Vision: Wears Glasses  Hearing Impairment : No     Advance Directive  Advance Directive?: None  Advance Directive offered?: AD Booklet refused    Domestic Abuse  Have you ever been the victim of abuse or violence?: No  Physical Abuse or Sexual Abuse: No  Verbal Abuse or Emotional  Abuse: No  Possible Abuse Reported to:: Not Applicable    Psychological Assessment  History of Substance Abuse: None  History of Psychiatric Problems: No  Non-compliant with Treatment: No  Newly Diagnosed Illness: No    Discharge Risks or Barriers  Discharge risks or barriers?: No PCP  Patient risk factors: No PCP    Anticipated Discharge Information  Anticipated discharge disposition: Home  Discharge Address: [address] [place] [address] [address] [id number]  Discharge Contact Phone Number: [phone]      This note has been reviewed by Marion DELEON

## 2018-02-01 NOTE — PROGRESS NOTES
Telemetry Report: pt has been SR/SB/ST.  HR: 50s-100  Measurements: (.14/.08/.42)  Ectopy: r PVC    Measurements and updates per Jose JOHN    For tele interpretation only, patient care is sole responsibility of primary nurse.

## 2018-02-01 NOTE — FACE TO FACE
Face to Face Note  -  Durable Medical Equipment    Nataliia Britt M.D. - NPI: [id number]  I certify that this patient is under my care and that they had a durable medical equipment(DME)face to face encounter by myself that meets the physician DME face-to-face encounter requirements with this patient on:    Date of encounter:   Patient:                    MRN:                       YOB: 2019  Latia Almanza  [id number]  1965     The encounter with the patient was in whole, or in part, for the following medical condition, which is the primary reason for durable medical equipment:  Asthma    I certify that, based on my findings, the following durable medical equipment is medically necessary:  Oxygen.    HOME O2 Saturation Measurements:(Values must be present for Home Oxygen orders)  Room air sat at rest: 88  Room air sat with amb: 85  With liters of O2: 2.0, O2 sat at rest with O2: 93  With Liters of O2: 2, O2 sat with amb with O2 : 91  Is the patient mobile?: Yes    My Clinical findings support the need for the above equipment due to:  Hypoxia    Supporting Symptoms:

## 2018-02-01 NOTE — FLOWSHEET NOTE
02/01/18 0413   Events/Summary/Plan   Events/Summary/Plan ON CPAP   CPAP/BiPAP [facility]   Nocturnal CPAP or BiPAP CPAP   Home Unit Used? Yes   Settings (If Known) 9   FiO2 or LPM 1   Home Mask Used? Yes

## 2018-02-01 NOTE — PROGRESS NOTES
Report received at change of shift, care of pt assumed. Pt resting comfortably in bed, reports feeling much better today. Pt currently on 1.5 L supplemental O2, breath sounds diminished in the bases with small amt of wheezing. Discussed plan of care and pt medicated per MAR. Will continue to monitor.

## 2018-02-01 NOTE — FLOWSHEET NOTE
01/31/18 1904   Events/Summary/Plan   Events/Summary/Plan SVN   Interdisciplinary Plan of Care-Goals (Indications)   Obstructive Ventilatory Defect or Pulmonary Disease without Obvious Obstruction History / Diagnosis   Interdisciplinary Plan of Care-Outcomes    Bronchodilator Outcome Diminished Wheezing and Volume of Air Movement Increased   Education   Education Yes - Pt. / Family has been Instructed in use of Respiratory Equipment;Yes - Pt. / Family has been Instructed in use of Respiratory Medications and Adverse Reactions   RT Assessment of Delivered Medications   Evaluation of Medication Delivery Daily Yes-- Pt /Family has been Instructed in use of Respiratory Medications and Adverse Reactions   SVN Group   #SVN Performed Yes   Given By: Mouthpiece   MDI/DPI Group   #MDI/DPI Given MDI/DPI x 1   Respiratory WDL   Respiratory (WDL) X   Chest Exam   Respiration 18   Pulse 68   Breath Sounds   Pre/Post Intervention Post Intervention Assessment   RUL Breath Sounds Diminished   RLL Breath Sounds Diminished   AYDEN Breath Sounds Diminished   LLL Breath Sounds Diminished   Oximetry   #Pulse Oximetry (Single Determination) Yes   Oxygen   Pulse Oximetry 94 %   O2 (LPM) 1.5   O2 Daily Delivery Respiratory  Nasal Cannula

## 2018-02-01 NOTE — DISCHARGE PLANNING
SW called [facility] and was informed that they are still working on this patient's referral.  No determination has been made yet.

## 2018-02-01 NOTE — PROGRESS NOTES
Received report from night shift; patient is up to the restroom, c/o fatigue and cough. Reviewed plan for O2 evaluation for discharge.

## 2018-02-01 NOTE — CARE PLAN
Problem: Safety  Goal: Will remain free from injury  Outcome: PROGRESSING AS EXPECTED  Call light and personal belongings within reach, bed in low locked position, treaded slipper socks in place, room free of clutter. Hourly rounding in place to ensure pt safety and needs are met.         Problem: Respiratory:  Goal: Respiratory status will improve  Outcome: PROGRESSING AS EXPECTED  Encouraged IS use, titrating O2 - pt currently on 1.5 L. Robitussin and Tessalon administered PRN cough.

## 2018-02-02 ENCOUNTER — PATIENT OUTREACH (OUTPATIENT)
Dept: HEALTH INFORMATION MANAGEMENT | Facility: OTHER | Age: 53
End: 2018-02-02

## 2018-02-02 RX ORDER — BENZONATATE 100 MG/1
100 CAPSULE ORAL 3 TIMES DAILY PRN
Qty: 30 CAP | Refills: 0 | Status: SHIPPED | OUTPATIENT
Start: 2018-02-02 | End: 2018-02-12

## 2018-02-02 NOTE — PROGRESS NOTES
Report received by Karen. Care of pt taken over at this time. Pt sitting up in bed in bed at this time. Pt has no complaints and is awaiting home oxygen to be d/c. Will continue to monitor and d/c after oxygen for home arrives.

## 2018-02-02 NOTE — PROGRESS NOTES
1605- IV removed. Pt d/c home. Pt d/c with oxygen. D/c instructions given, d/c medications discussed, f/u appointments discussed. Pt verbalizes understanding of instructions and questions answered. All belongings sent home with pt. Pt wheeled down to car by transport with home oxygen on.

## 2018-02-02 NOTE — PROGRESS NOTES
2/2/18 8:42 CM post discharge outreach first attempt.  Patient didn't answer telephone. VM has not been set-up. CM unable to complete post discharge outreach.    2/2/18 2:25pm  CM post discharge second attempt at outreach.  No answer on telephone number listed in Epic. VM has not been set-up.  CM unable to complete post discharge call.     2/2/18 2:55pm  Post discharge call completed.

## 2018-02-02 NOTE — DISCHARGE SUMMARY
CHIEF COMPLAINT ON ADMISSION  Chief Complaint   Patient presents with   • Shortness of Breath     Pt states she has been sick since Nov.  Pt does have asthma and has been on steroids since November.  Pt with increasing SOB, cough, body aches.       CODE STATUS  Prior    HPI & HOSPITAL COURSE  This is a 52 y.o. Morbidly obese female here with persistent wheezing and shortness of breath.  Influenza screen was positive for influenza A.  She was treated with tamiflu, as well as respiratory protocol.  She became very short of breath with exertion , and this improved during her stay, though she continued to require oxygen at 2L.  We arranged home oxygen for her and once this was delivered, she was discharged home.  She actually just called (this being day after dc) and asked for tessalon, as this had helped her.  I sent this in to the pharmacy.  She had been tapering her steroids down as an outpatient, but we kept her at 10mg daily for now until she follows with her pulmonologist.    The patient met 2-midnight criteria for an inpatient stay at the time of discharge.    Therefore, she is discharged in fair and stable condition with close outpatient follow-up.    SPECIFIC OUTPATIENT FOLLOW-UP  Return to ER if worsening SOB, fever  Will be on home O2    DISCHARGE PROBLEM LIST  Principal Problem (Resolved):    Asthma exacerbation POA: Yes  Active Problems:    Acute respiratory failure with hypoxia (CMS-HCC) POA: Yes    Sleep apnea POA: Yes    Transaminitis POA: Yes    Hypokalemia POA: Yes    BMI 45.0-49.9, adult (CMS-HCC)- 45.7 POA: Yes    DM (diabetes mellitus) (CMS-HCC) POA: Yes    Leukopenia POA: Yes  Resolved Problems:    Influenza A POA: Yes      FOLLOW UP  Future Appointments  Date Time Provider Department Center   2/6/2018 11:20 AM MIKE Leon   2/16/2018 11:00 AM MATTHEW Hawkins   3/13/2018 12:40 PM [facility] None     Pcp Pt States None            MEDICATIONS ON  DISCHARGE  Medication Information           Added tessalon tid prn           albuterol (PROVENTIL) 2.5mg/3ml Nebu Soln solution for nebulization  3 mL by Nebulization route every four hours as needed for Shortness of Breath.             albuterol 108 (90 Base) MCG/ACT Aero Soln inhalation aerosol  Inhale 2 Puffs by mouth every 6 hours as needed for Shortness of Breath.             BD PEN NEEDLE ALIZA U/F 32G X 4 MM Misc               escitalopram (LEXAPRO) 20 MG tablet  Take 1 Tab by mouth every day.             estradiol (VIVELLE DOT) 0.05 MG/24HR PATCH BIWEEKLY  APPLY ONE PATCH TO CLEAN, DRY SKIN AS DIRECTED TWO TIMES PER WEEK. REMOVE OLD PATCH BEFORE APPLYING NEW.             fluticasone-salmeterol (ADVAIR DISKUS) 250-50 MCG/DOSE AEROSOL POWDER, BREATH ACTIVATED  Inhale 1 Puff by mouth every 12 hours.             guaifenesin-codeine (CHERATUSSIN AC) Solution oral solution  Take 5 mL by mouth every 6 hours as needed.             omeprazole (PRILOSEC) 20 MG delayed-release capsule  Take 1 Cap by mouth every day.             ONETOUCH VERIO strip               oseltamivir (TAMIFLU) 75 MG Cap  Take 1 Cap by mouth every 12 hours for 3 doses.   new          predniSONE (DELTASONE) 10 MG Tab  Take 1 Tab by mouth every day for 30 days.             VICTOZA 18 MG/3ML Solution Pen-injector injection                   DIET  No orders of the defined types were placed in this encounter.      ACTIVITY  As tolerated.  Weight bearing as tolerated      CONSULTATIONS  None    PROCEDURES  None    LABORATORY  Lab Results   Component Value Date/Time    SODIUM 138 02/01/2018 04:22 AM    POTASSIUM 3.3 (L) 02/01/2018 04:22 AM    CHLORIDE 107 02/01/2018 04:22 AM    CO2 23 02/01/2018 04:22 AM    GLUCOSE 97 02/01/2018 04:22 AM    BUN 7 (L) 02/01/2018 04:22 AM    CREATININE 0.95 02/01/2018 04:22 AM        Lab Results   Component Value Date/Time    WBC 3.4 (L) 01/31/2018 05:10 AM    HEMOGLOBIN 11.5 (L) 01/31/2018 05:10 AM    HEMATOCRIT 34.7 (L)  01/31/2018 05:10 AM    PLATELETCT 232 01/31/2018 05:10 AM        Total time of the discharge process exceeds 38 minutes

## 2018-02-03 LAB
BACTERIA BLD CULT: NORMAL
BACTERIA BLD CULT: NORMAL
SIGNIFICANT IND 70042: NORMAL
SIGNIFICANT IND 70042: NORMAL
SITE SITE: NORMAL
SITE SITE: NORMAL
SOURCE SOURCE: NORMAL
SOURCE SOURCE: NORMAL

## 2018-02-16 ENCOUNTER — APPOINTMENT (OUTPATIENT)
Dept: MEDICAL GROUP | Facility: MEDICAL CENTER | Age: 53
End: 2018-02-16
Payer: COMMERCIAL

## 2018-03-13 ENCOUNTER — APPOINTMENT (OUTPATIENT)
Dept: PULMONOLOGY | Facility: HOSPICE | Age: 53
End: 2018-03-13
Payer: COMMERCIAL

## 2018-03-26 ENCOUNTER — OFFICE VISIT (OUTPATIENT)
Dept: MEDICAL GROUP | Facility: MEDICAL CENTER | Age: 53
End: 2018-03-26
Payer: COMMERCIAL

## 2018-03-26 VITALS
OXYGEN SATURATION: 95 % | TEMPERATURE: 98.3 F | BODY MASS INDEX: 41.95 KG/M2 | DIASTOLIC BLOOD PRESSURE: 88 MMHG | WEIGHT: 293 LBS | SYSTOLIC BLOOD PRESSURE: 140 MMHG | HEIGHT: 70 IN | HEART RATE: 72 BPM

## 2018-03-26 DIAGNOSIS — F33.0 MAJOR DEPRESSIVE DISORDER, RECURRENT EPISODE, MILD (HCC): ICD-10-CM

## 2018-03-26 DIAGNOSIS — E55.9 HYPOVITAMINOSIS D: ICD-10-CM

## 2018-03-26 DIAGNOSIS — G47.33 OSA ON CPAP: ICD-10-CM

## 2018-03-26 DIAGNOSIS — J30.1 CHRONIC SEASONAL ALLERGIC RHINITIS DUE TO POLLEN: ICD-10-CM

## 2018-03-26 DIAGNOSIS — Z00.00 HEALTH CARE MAINTENANCE: ICD-10-CM

## 2018-03-26 DIAGNOSIS — D72.819 LEUKOPENIA, UNSPECIFIED TYPE: ICD-10-CM

## 2018-03-26 DIAGNOSIS — E66.01 OBESITY, CLASS III, BMI 40-49.9 (MORBID OBESITY) (HCC): ICD-10-CM

## 2018-03-26 DIAGNOSIS — E78.5 DYSLIPIDEMIA: ICD-10-CM

## 2018-03-26 DIAGNOSIS — F41.9 ANXIETY: ICD-10-CM

## 2018-03-26 DIAGNOSIS — J45.20 INTERMITTENT ASTHMA WITHOUT COMPLICATION, UNSPECIFIED ASTHMA SEVERITY: ICD-10-CM

## 2018-03-26 DIAGNOSIS — D86.9 SARCOIDOSIS: ICD-10-CM

## 2018-03-26 DIAGNOSIS — R73.01 IFG (IMPAIRED FASTING GLUCOSE): ICD-10-CM

## 2018-03-26 DIAGNOSIS — Z12.39 SCREENING FOR MALIGNANT NEOPLASM OF BREAST: ICD-10-CM

## 2018-03-26 DIAGNOSIS — Z76.89 ENCOUNTER TO ESTABLISH CARE: ICD-10-CM

## 2018-03-26 DIAGNOSIS — Z79.890 POSTMENOPAUSAL HRT (HORMONE REPLACEMENT THERAPY): ICD-10-CM

## 2018-03-26 PROCEDURE — 99204 OFFICE O/P NEW MOD 45 MIN: CPT | Performed by: INTERNAL MEDICINE

## 2018-03-26 RX ORDER — ESTRADIOL 0.05 MG/D
PATCH, EXTENDED RELEASE TRANSDERMAL
Qty: 8 PATCH | Refills: 0 | Status: SHIPPED | OUTPATIENT
Start: 2018-03-26 | End: 2018-06-04 | Stop reason: SDUPTHER

## 2018-03-26 ASSESSMENT — PATIENT HEALTH QUESTIONNAIRE - PHQ9
4. FEELING TIRED OR HAVING LITTLE ENERGY: 1
SUM OF ALL RESPONSES TO PHQ9 QUESTIONS 1 AND 2: 2
8. MOVING OR SPEAKING SO SLOWLY THAT OTHER PEOPLE COULD HAVE NOTICED. OR THE OPPOSITE, BEING SO FIGETY OR RESTLESS THAT YOU HAVE BEEN MOVING AROUND A LOT MORE THAN USUAL: 0
7. TROUBLE CONCENTRATING ON THINGS, SUCH AS READING THE NEWSPAPER OR WATCHING TELEVISION: 0
SUM OF ALL RESPONSES TO PHQ QUESTIONS 1-9: 9
3. TROUBLE FALLING OR STAYING ASLEEP OR SLEEPING TOO MUCH: 2
1. LITTLE INTEREST OR PLEASURE IN DOING THINGS: 1
5. POOR APPETITE OR OVEREATING: 1
9. THOUGHTS THAT YOU WOULD BE BETTER OFF DEAD, OR OF HURTING YOURSELF: 1
6. FEELING BAD ABOUT YOURSELF - OR THAT YOU ARE A FAILURE OR HAVE LET YOURSELF OR YOUR FAMILY DOWN: 2
2. FEELING DOWN, DEPRESSED, IRRITABLE, OR HOPELESS: 1
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
## 2018-03-29 DIAGNOSIS — F33.41 RECURRENT MAJOR DEPRESSIVE DISORDER, IN PARTIAL REMISSION (HCC): ICD-10-CM

## 2018-03-29 RX ORDER — ESCITALOPRAM OXALATE 20 MG/1
20 TABLET ORAL DAILY
Qty: 90 TAB | Refills: 1 | Status: SHIPPED | OUTPATIENT
Start: 2018-03-29 | End: 2018-09-16 | Stop reason: SDUPTHER

## 2018-04-09 ENCOUNTER — APPOINTMENT (OUTPATIENT)
Dept: MEDICAL GROUP | Facility: MEDICAL CENTER | Age: 53
End: 2018-04-09
Payer: COMMERCIAL

## 2018-04-17 ENCOUNTER — APPOINTMENT (OUTPATIENT)
Dept: MEDICAL GROUP | Facility: MEDICAL CENTER | Age: 53
End: 2018-04-17
Payer: COMMERCIAL

## 2018-05-08 ENCOUNTER — HOSPITAL ENCOUNTER (OUTPATIENT)
Dept: LAB | Facility: MEDICAL CENTER | Age: 53
End: 2018-05-08
Attending: INTERNAL MEDICINE
Payer: COMMERCIAL

## 2018-05-08 ENCOUNTER — HOSPITAL ENCOUNTER (OUTPATIENT)
Dept: LAB | Facility: MEDICAL CENTER | Age: 53
End: 2018-05-08
Attending: ALLERGY & IMMUNOLOGY
Payer: COMMERCIAL

## 2018-05-08 DIAGNOSIS — E55.9 HYPOVITAMINOSIS D: ICD-10-CM

## 2018-05-08 DIAGNOSIS — R73.01 IFG (IMPAIRED FASTING GLUCOSE): ICD-10-CM

## 2018-05-08 DIAGNOSIS — D86.9 SARCOIDOSIS: ICD-10-CM

## 2018-05-08 DIAGNOSIS — E78.5 DYSLIPIDEMIA: ICD-10-CM

## 2018-05-08 DIAGNOSIS — D72.819 LEUKOPENIA, UNSPECIFIED TYPE: ICD-10-CM

## 2018-05-08 LAB
25(OH)D3 SERPL-MCNC: 24 NG/ML (ref 30–100)
BASOPHILS # BLD AUTO: 0.7 % (ref 0–1.8)
BASOPHILS # BLD AUTO: 0.7 % (ref 0–1.8)
BASOPHILS # BLD AUTO: 0.9 % (ref 0–1.8)
BASOPHILS # BLD: 0.04 K/UL (ref 0–0.12)
BASOPHILS # BLD: 0.04 K/UL (ref 0–0.12)
BASOPHILS # BLD: 0.05 K/UL (ref 0–0.12)
BNP SERPL-MCNC: 16 PG/ML (ref 0–100)
CREAT UR-MCNC: 522.8 MG/DL
EOSINOPHIL # BLD AUTO: 0.1 K/UL (ref 0–0.51)
EOSINOPHIL # BLD AUTO: 0.11 K/UL (ref 0–0.51)
EOSINOPHIL # BLD AUTO: 0.12 K/UL (ref 0–0.51)
EOSINOPHIL NFR BLD: 1.7 % (ref 0–6.9)
EOSINOPHIL NFR BLD: 2 % (ref 0–6.9)
EOSINOPHIL NFR BLD: 2.1 % (ref 0–6.9)
ERYTHROCYTE [DISTWIDTH] IN BLOOD BY AUTOMATED COUNT: 47.6 FL (ref 35.9–50)
ERYTHROCYTE [DISTWIDTH] IN BLOOD BY AUTOMATED COUNT: 48.1 FL (ref 35.9–50)
ERYTHROCYTE [DISTWIDTH] IN BLOOD BY AUTOMATED COUNT: 48.3 FL (ref 35.9–50)
FERRITIN SERPL-MCNC: 54.5 NG/ML (ref 10–291)
HBV CORE AB SERPL QL IA: NEGATIVE
HBV SURFACE AB SERPL IA-ACNC: <3.1 MIU/ML (ref 0–10)
HBV SURFACE AG SER QL: NEGATIVE
HCT VFR BLD AUTO: 45.5 % (ref 37–47)
HCT VFR BLD AUTO: 45.8 % (ref 37–47)
HCT VFR BLD AUTO: 46 % (ref 37–47)
HCV AB SER QL: NEGATIVE
HGB BLD-MCNC: 14.6 G/DL (ref 12–16)
HGB BLD-MCNC: 14.7 G/DL (ref 12–16)
HGB BLD-MCNC: 14.8 G/DL (ref 12–16)
IMM GRANULOCYTES # BLD AUTO: 0.01 K/UL (ref 0–0.11)
IMM GRANULOCYTES # BLD AUTO: 0.02 K/UL (ref 0–0.11)
IMM GRANULOCYTES # BLD AUTO: 0.02 K/UL (ref 0–0.11)
IMM GRANULOCYTES NFR BLD AUTO: 0.2 % (ref 0–0.9)
IMM GRANULOCYTES NFR BLD AUTO: 0.3 % (ref 0–0.9)
IMM GRANULOCYTES NFR BLD AUTO: 0.4 % (ref 0–0.9)
LYMPHOCYTES # BLD AUTO: 1.41 K/UL (ref 1–4.8)
LYMPHOCYTES # BLD AUTO: 1.46 K/UL (ref 1–4.8)
LYMPHOCYTES # BLD AUTO: 1.47 K/UL (ref 1–4.8)
LYMPHOCYTES NFR BLD: 25 % (ref 22–41)
LYMPHOCYTES NFR BLD: 25.3 % (ref 22–41)
LYMPHOCYTES NFR BLD: 25.3 % (ref 22–41)
MCH RBC QN AUTO: 28.9 PG (ref 27–33)
MCH RBC QN AUTO: 29.2 PG (ref 27–33)
MCH RBC QN AUTO: 29.3 PG (ref 27–33)
MCHC RBC AUTO-ENTMCNC: 31.7 G/DL (ref 33.6–35)
MCHC RBC AUTO-ENTMCNC: 32.1 G/DL (ref 33.6–35)
MCHC RBC AUTO-ENTMCNC: 32.5 G/DL (ref 33.6–35)
MCV RBC AUTO: 90.1 FL (ref 81.4–97.8)
MCV RBC AUTO: 90.9 FL (ref 81.4–97.8)
MCV RBC AUTO: 91.1 FL (ref 81.4–97.8)
MICROALBUMIN UR-MCNC: 2 MG/DL
MICROALBUMIN/CREAT UR: 4 MG/G (ref 0–30)
MONOCYTES # BLD AUTO: 0.46 K/UL (ref 0–0.85)
MONOCYTES # BLD AUTO: 0.5 K/UL (ref 0–0.85)
MONOCYTES # BLD AUTO: 0.51 K/UL (ref 0–0.85)
MONOCYTES NFR BLD AUTO: 8 % (ref 0–13.4)
MONOCYTES NFR BLD AUTO: 8.6 % (ref 0–13.4)
MONOCYTES NFR BLD AUTO: 9 % (ref 0–13.4)
NEUTROPHILS # BLD AUTO: 3.54 K/UL (ref 2–7.15)
NEUTROPHILS # BLD AUTO: 3.66 K/UL (ref 2–7.15)
NEUTROPHILS # BLD AUTO: 3.7 K/UL (ref 2–7.15)
NEUTROPHILS NFR BLD: 62.7 % (ref 44–72)
NEUTROPHILS NFR BLD: 63.5 % (ref 44–72)
NEUTROPHILS NFR BLD: 63.6 % (ref 44–72)
NRBC # BLD AUTO: 0 K/UL
NRBC BLD-RTO: 0 /100 WBC
PLATELET # BLD AUTO: 330 K/UL (ref 164–446)
PLATELET # BLD AUTO: 332 K/UL (ref 164–446)
PLATELET # BLD AUTO: 339 K/UL (ref 164–446)
PMV BLD AUTO: 10.2 FL (ref 9–12.9)
PMV BLD AUTO: 10.2 FL (ref 9–12.9)
PMV BLD AUTO: 9.9 FL (ref 9–12.9)
RBC # BLD AUTO: 5.04 M/UL (ref 4.2–5.4)
RBC # BLD AUTO: 5.05 M/UL (ref 4.2–5.4)
RBC # BLD AUTO: 5.05 M/UL (ref 4.2–5.4)
WBC # BLD AUTO: 5.6 K/UL (ref 4.8–10.8)
WBC # BLD AUTO: 5.8 K/UL (ref 4.8–10.8)
WBC # BLD AUTO: 5.8 K/UL (ref 4.8–10.8)

## 2018-05-08 PROCEDURE — 85025 COMPLETE CBC W/AUTO DIFF WBC: CPT | Mod: 91

## 2018-05-08 PROCEDURE — 86803 HEPATITIS C AB TEST: CPT

## 2018-05-08 PROCEDURE — 87340 HEPATITIS B SURFACE AG IA: CPT

## 2018-05-08 PROCEDURE — 82570 ASSAY OF URINE CREATININE: CPT

## 2018-05-08 PROCEDURE — 80053 COMPREHEN METABOLIC PANEL: CPT | Mod: 91

## 2018-05-08 PROCEDURE — 82728 ASSAY OF FERRITIN: CPT

## 2018-05-08 PROCEDURE — 86225 DNA ANTIBODY NATIVE: CPT

## 2018-05-08 PROCEDURE — 86235 NUCLEAR ANTIGEN ANTIBODY: CPT | Mod: 91

## 2018-05-08 PROCEDURE — 82043 UR ALBUMIN QUANTITATIVE: CPT

## 2018-05-08 PROCEDURE — 80061 LIPID PANEL: CPT

## 2018-05-08 PROCEDURE — 80053 COMPREHEN METABOLIC PANEL: CPT

## 2018-05-08 PROCEDURE — 86704 HEP B CORE ANTIBODY TOTAL: CPT

## 2018-05-08 PROCEDURE — 86003 ALLG SPEC IGE CRUDE XTRC EA: CPT | Mod: 91

## 2018-05-08 PROCEDURE — 36415 COLL VENOUS BLD VENIPUNCTURE: CPT

## 2018-05-08 PROCEDURE — 86706 HEP B SURFACE ANTIBODY: CPT

## 2018-05-08 PROCEDURE — 86708 HEPATITIS A ANTIBODY: CPT

## 2018-05-08 PROCEDURE — 82390 ASSAY OF CERULOPLASMIN: CPT

## 2018-05-08 PROCEDURE — 83516 IMMUNOASSAY NONANTIBODY: CPT | Mod: 91

## 2018-05-08 PROCEDURE — 83880 ASSAY OF NATRIURETIC PEPTIDE: CPT

## 2018-05-08 PROCEDURE — 83036 HEMOGLOBIN GLYCOSYLATED A1C: CPT

## 2018-05-08 PROCEDURE — 82306 VITAMIN D 25 HYDROXY: CPT

## 2018-05-08 PROCEDURE — 86038 ANTINUCLEAR ANTIBODIES: CPT

## 2018-05-08 PROCEDURE — 82103 ALPHA-1-ANTITRYPSIN TOTAL: CPT

## 2018-05-08 PROCEDURE — 83516 IMMUNOASSAY NONANTIBODY: CPT

## 2018-05-08 PROCEDURE — 82104 ALPHA-1-ANTITRYPSIN PHENO: CPT

## 2018-05-08 PROCEDURE — 85025 COMPLETE CBC W/AUTO DIFF WBC: CPT

## 2018-05-09 ENCOUNTER — HOSPITAL ENCOUNTER (OUTPATIENT)
Facility: MEDICAL CENTER | Age: 53
End: 2018-05-09
Attending: INTERNAL MEDICINE | Admitting: INTERNAL MEDICINE
Payer: COMMERCIAL

## 2018-05-09 VITALS
HEART RATE: 61 BPM | SYSTOLIC BLOOD PRESSURE: 153 MMHG | TEMPERATURE: 97.3 F | RESPIRATION RATE: 18 BRPM | WEIGHT: 293 LBS | BODY MASS INDEX: 41.95 KG/M2 | HEIGHT: 70 IN | OXYGEN SATURATION: 93 % | DIASTOLIC BLOOD PRESSURE: 75 MMHG

## 2018-05-09 LAB
ALBUMIN SERPL BCP-MCNC: 4.1 G/DL (ref 3.2–4.9)
ALBUMIN SERPL BCP-MCNC: 4.3 G/DL (ref 3.2–4.9)
ALBUMIN/GLOB SERPL: 1.2 G/DL
ALBUMIN/GLOB SERPL: 1.4 G/DL
ALP SERPL-CCNC: 92 U/L (ref 30–99)
ALP SERPL-CCNC: 93 U/L (ref 30–99)
ALT SERPL-CCNC: 70 U/L (ref 2–50)
ALT SERPL-CCNC: 71 U/L (ref 2–50)
ANION GAP SERPL CALC-SCNC: 10 MMOL/L (ref 0–11.9)
ANION GAP SERPL CALC-SCNC: 12 MMOL/L (ref 0–11.9)
ANION GAP SERPL CALC-SCNC: 13 MMOL/L (ref 0–11.9)
AST SERPL-CCNC: 55 U/L (ref 12–45)
AST SERPL-CCNC: 57 U/L (ref 12–45)
BILIRUB SERPL-MCNC: 0.6 MG/DL (ref 0.1–1.5)
BILIRUB SERPL-MCNC: 0.6 MG/DL (ref 0.1–1.5)
BUN SERPL-MCNC: 12 MG/DL (ref 8–22)
BUN SERPL-MCNC: 12 MG/DL (ref 8–22)
BUN SERPL-MCNC: 15 MG/DL (ref 8–22)
CALCIUM SERPL-MCNC: 9.6 MG/DL (ref 8.5–10.5)
CHLORIDE SERPL-SCNC: 106 MMOL/L (ref 96–112)
CHLORIDE SERPL-SCNC: 106 MMOL/L (ref 96–112)
CHLORIDE SERPL-SCNC: 107 MMOL/L (ref 96–112)
CHOLEST SERPL-MCNC: 185 MG/DL (ref 100–199)
CO2 SERPL-SCNC: 20 MMOL/L (ref 20–33)
CO2 SERPL-SCNC: 22 MMOL/L (ref 20–33)
CO2 SERPL-SCNC: 23 MMOL/L (ref 20–33)
CREAT SERPL-MCNC: 0.94 MG/DL (ref 0.5–1.4)
CREAT SERPL-MCNC: 0.95 MG/DL (ref 0.5–1.4)
CREAT SERPL-MCNC: 0.99 MG/DL (ref 0.5–1.4)
EKG IMPRESSION: NORMAL
GLOBULIN SER CALC-MCNC: 3 G/DL (ref 1.9–3.5)
GLOBULIN SER CALC-MCNC: 3.3 G/DL (ref 1.9–3.5)
GLUCOSE BLD-MCNC: 109 MG/DL (ref 65–99)
GLUCOSE SERPL-MCNC: 106 MG/DL (ref 65–99)
GLUCOSE SERPL-MCNC: 107 MG/DL (ref 65–99)
GLUCOSE SERPL-MCNC: 123 MG/DL (ref 65–99)
HDLC SERPL-MCNC: 68 MG/DL
LDLC SERPL CALC-MCNC: 103 MG/DL
POTASSIUM SERPL-SCNC: 3.9 MMOL/L (ref 3.6–5.5)
POTASSIUM SERPL-SCNC: 4 MMOL/L (ref 3.6–5.5)
POTASSIUM SERPL-SCNC: 4 MMOL/L (ref 3.6–5.5)
PROT SERPL-MCNC: 7.3 G/DL (ref 6–8.2)
PROT SERPL-MCNC: 7.4 G/DL (ref 6–8.2)
SODIUM SERPL-SCNC: 138 MMOL/L (ref 135–145)
SODIUM SERPL-SCNC: 140 MMOL/L (ref 135–145)
SODIUM SERPL-SCNC: 141 MMOL/L (ref 135–145)
TRIGL SERPL-MCNC: 70 MG/DL (ref 0–149)

## 2018-05-09 PROCEDURE — 160048 HCHG OR STATISTICAL LEVEL 1-5: Performed by: INTERNAL MEDICINE

## 2018-05-09 PROCEDURE — 700111 HCHG RX REV CODE 636 W/ 250 OVERRIDE (IP)

## 2018-05-09 PROCEDURE — 88305 TISSUE EXAM BY PATHOLOGIST: CPT | Mod: 59

## 2018-05-09 PROCEDURE — 93010 ELECTROCARDIOGRAM REPORT: CPT | Performed by: INTERNAL MEDICINE

## 2018-05-09 PROCEDURE — 160009 HCHG ANES TIME/MIN: Performed by: INTERNAL MEDICINE

## 2018-05-09 PROCEDURE — 80048 BASIC METABOLIC PNL TOTAL CA: CPT

## 2018-05-09 PROCEDURE — 700101 HCHG RX REV CODE 250

## 2018-05-09 PROCEDURE — 160035 HCHG PACU - 1ST 60 MINS PHASE I: Performed by: INTERNAL MEDICINE

## 2018-05-09 PROCEDURE — 160036 HCHG PACU - EA ADDL 30 MINS PHASE I: Performed by: INTERNAL MEDICINE

## 2018-05-09 PROCEDURE — 82962 GLUCOSE BLOOD TEST: CPT

## 2018-05-09 PROCEDURE — 160002 HCHG RECOVERY MINUTES (STAT): Performed by: INTERNAL MEDICINE

## 2018-05-09 PROCEDURE — 93005 ELECTROCARDIOGRAM TRACING: CPT | Performed by: INTERNAL MEDICINE

## 2018-05-09 PROCEDURE — 88312 SPECIAL STAINS GROUP 1: CPT

## 2018-05-09 PROCEDURE — 160203 HCHG ENDO MINUTES - 1ST 30 MINS LEVEL 4: Performed by: INTERNAL MEDICINE

## 2018-05-09 PROCEDURE — 500066 HCHG BITE BLOCK, ECT: Performed by: INTERNAL MEDICINE

## 2018-05-09 RX ORDER — SODIUM CHLORIDE 9 MG/ML
INJECTION, SOLUTION INTRAVENOUS CONTINUOUS
Status: DISCONTINUED | OUTPATIENT
Start: 2018-05-09 | End: 2018-05-09 | Stop reason: HOSPADM

## 2018-05-09 RX ORDER — OMEPRAZOLE 20 MG/1
20 CAPSULE, DELAYED RELEASE ORAL 2 TIMES DAILY
Qty: 60 CAP | Refills: 11 | Status: SHIPPED | OUTPATIENT
Start: 2018-05-09 | End: 2019-02-13

## 2018-05-09 RX ORDER — PREDNISONE 10 MG/1
10 TABLET ORAL DAILY
COMMUNITY
End: 2018-09-25

## 2018-05-09 RX ADMIN — SODIUM CHLORIDE: 9 INJECTION, SOLUTION INTRAVENOUS at 11:10

## 2018-05-09 ASSESSMENT — PAIN SCALES - GENERAL
PAINLEVEL_OUTOF10: 0

## 2018-05-09 NOTE — OR SURGEON
Immediate Post OP Note    PreOp Diagnosis: heartburn, abd pains, nausea    PostOp Diagnosis: esophagitis, heartburn, abd pains, nausea    Procedure(s):  GASTROSCOPY - Wound Class: Clean Contaminated    Surgeon(s):  Martin Castillo M.D.    Anesthesiologist/Type of Anesthesia:  Anesthesiologist: Lauri Rojas M.D./General    Surgical Staff:  Circulator: Rosaura Alfredo; Terry Escobar R.N.  Endoscopy Technician: Mily Robert    Specimens removed if any:  * No specimens in log *    Estimated Blood Loss: < 5 cc    Findings: Mild, LA Classification Grade B erosive esohagitis.  Suspected Baker's esophagus, biopsied.      Complications: no immediate        5/9/2018 12:24 PM Martin Castillo M.D.

## 2018-05-09 NOTE — DISCHARGE INSTRUCTIONS
ENDOSCOPY HOME CARE INSTRUCTIONS    GASTROSCOPY   1. Don't eat or drink anything for about an hour after the test (can eat/drink after 1:20 PM). You can then resume your regular diet.  2. Don't drive or drink alcohol for 24 hours. The medication you received will make you too drowsy.  3. Don't take any coffee, tea, or aspirin products until after you see your doctor. These can harm the lining of your stomach.  4. If you begin to vomit bloody material, or develop black or bloody stools, call your doctor as soon as possible.  5. If you have any neck, chest, abdominal pain or temp of 100 degrees, call your doctor.  6. MD will call you next week with biopsy results and set up a time to see him in 8-12 weeks.  7. Prescriptions: Prilosec-increase to twice a day    You should call 911 if you develop problems with breathing or chest pain.  If any questions arise, call your doctor. If your doctor is not available, please feel free to call [phone]. You can also call the HEALTH HOTLINE open 24 hours/day, 7 days/week and speak to a nurse at [phone], or toll free [phone].    Depression / Suicide Risk    As you are discharged from this [facility] facility, it is important to learn how to keep safe from harming yourself.    Recognize the warning signs:  · Abrupt changes in personality, positive or negative- including increase in energy   · Giving away possessions  · Change in eating patterns- significant weight changes-  positive or negative  · Change in sleeping patterns- unable to sleep or sleeping all the time   · Unwillingness or inability to communicate  · Depression  · Unusual sadness, discouragement and loneliness  · Talk of wanting to die  · Neglect of personal appearance   · Rebelliousness- reckless behavior  · Withdrawal from people/activities they love  · Confusion- inability to concentrate     If you or a loved one observes any of these behaviors or has concerns about self-harm, here's what you  can do:  · Talk about it- your feelings and reasons for harming yourself  · Remove any means that you might use to hurt yourself (examples: pills, rope, extension cords, firearm)  · Get professional help from the community (Mental Health, Substance Abuse, psychological counseling)  · Do not be alone:Call your Safe Contact- someone whom you trust who will be there for you.  · Call your local CRISIS HOTLINE 702-1704 or 207-727-9145  · Call your local Children's Mobile Crisis Response Team [place] (582) 763-4410 or www.[employer]  · Call the toll free National Suicide Prevention Hotlines   · National Suicide Prevention Lifeline 796-759-IVTP (3320)  · National Hope Line Network 800-SUICIDE (682-8873)      You should call 971 if you develop problems with breathing or chest pain.    Nurse's Signature: ___________________________________    I acknowledge receipt and understanding of these Home Care instructions.

## 2018-05-09 NOTE — OR NURSING
1224 Arrives per cart to PACU, accompanied by OR staff. Patient is a little sleepy, monitors applied.  Handoff report received from anesthesia and OR nursing personnel. Note BP low, cuff repositioned and BP rechecked.    1232 Patient sleeping, SPO2 < 90% at times, O2 on per nasal cannula.    1300 Sleeping, snoring at times.    1345 Continues to sleep, snoring at times. O2 DC'd, continue to monitor SPO2 levels.    1430 Awake briefly, denies c/o's. Falls back to sleep readily.    1500 Patient awake, denies c/o's. Up to dress. Left message on friend's phone that she is ready to be picked up.    1510 Care completed. Up to dress, then settled on cart to await friend's arrival.    1615 Discharge instructions reviewed with patient and her friend Shanice. They verbalized understanding. Patient discharged per ambulatory with belongings and discharge instructions. Friend in accompaniment.

## 2018-05-09 NOTE — PROCEDURES
DATE OF SERVICE:  05/09/2018    TITLE:  GASTROENTEROLOGY PROCEDURE NOTE    PROCEDURE PERFORMED:  Esophagogastroduodenoscopy with biopsy.    INSTRUMENT UTILIZED:  Olympus flexible forward viewing gastroscope.    INDICATIONS:  Patient with chronic heartburn, dyspepsia, and nausea.    CONSENT:  Full RBA discussion held prior to procedure and a signed and   witnessed consent form was placed on the chart.    SEDATION AND ANESTHESIA:  Provided by Dr. Rojas.    TOLERANCE:  Excellent.    PATHOLOGY SPECIMENS:  A.  Duodenal biopsies.  B.  Gastric biopsies.  C.  Esophageal biopsies from 37 cm distal to the incisors.    PROCEDURE IN DETAIL:  After adequate sedation, the Olympus flexible forward   viewing gastroscope was advanced per the oral route into the esophagus.  The   esophageal mucosa was carefully inspected.  There was noted to be mild Los   Nori classification grade B distal erosive esophagitis with a single small   erosion measuring about 4 mm and some erythema just proximal to GE junction at   37 cm.  There was also noted to be apparent salmon pink mucosa extending   upward slightly and a few small islands of salmon pink mucosa.  Overall, this   appearance was consistent with short segment Baker's esophagus/metaplasia.    The instrument was passed into the stomach where air was insufflated and the   gastric mucosa inspected including a retroflexed view of the gastric cardia.    There was mild diffuse erythema with a few scattered erosions in the antrum.    The instrument was passed through the patent pyloric channel into the   duodenum.  The duodenal bulb was erythematous in few areas with diffuse   scattered erosions.  The second and third portions of the duodenum were   unremarkable.  Random biopsies were obtained from the first, second, and third   portions of the duodenum to evaluate for duodenitis or celiac sprue in this   patient with dyspepsia.  The instrument was pulled back into the stomach.    Random  biopsies were obtained from the antrum and body to evaluate for   gastritis and Helicobacter pylori infection in this patient with dyspepsia.    Lastly, random biopsies were obtained from the area of suspected short segment   Baker's esophagus in the distal esophagus.    No immediate complications.    IMPRESSIONS AND FINDINGS:  1.  Erosive esophagitis, Pepin classification grade B.  2.  Baker's esophagus, suspected.  Biopsy is pending.  3.  Gastritis, suspected.  4.  Duodenitis, suspected.    PLAN AND RECOMMENDATIONS:  1.  Observe for any adverse events from today's procedure.  2.  Increase omeprazole 20 mg p.o. b.i.d. dosing (currently on 3-day dosing).  3.  Behavioral modification as it relates to reflux including gradual   sustained weight loss.  4.  I will contact the patient in 1 week with pathology results and arrange   for a followup clinic visit in about 12 weeks.       ____________________________________     MD BABAK SANDERSON / YESSY    DD:  05/09/2018 12:36:28  DT:  05/09/2018 13:05:21    D#:  [id number]  Job#:  [id number]    cc: MIQUEL MCFARLANE MD

## 2018-05-10 LAB
A1AT PHENOTYP SERPL-IMP: NORMAL
A1AT SERPL-MCNC: 151 MG/DL (ref 90–200)
CERULOPLASMIN SERPL-MCNC: 34 MG/DL (ref 17–54)
CODFISH IGE QN: <0.1 KU/L
COW MILK IGE QN: <0.1 KU/L
DEPRECATED MISC ALLERGEN IGE RAST QL: NORMAL
EGG WHITE IGE QN: <0.1 KU/L
EST. AVERAGE GLUCOSE BLD GHB EST-MCNC: 131 MG/DL
HAV AB SER QL IA: NEGATIVE
HBA1C MFR BLD: 6.2 % (ref 0–5.6)
NUCLEAR IGG SER QL IA: NORMAL
PEANUT IGE QN: <0.1 KU/L
SMA IGG SER-ACNC: 9 UNITS (ref 0–19)
SOYBEAN IGE QN: <0.1 KU/L
WHEAT IGE QN: <0.1 KU/L

## 2018-05-11 LAB — GLIADIN PEPTIDE+TTG IGA+IGG SER QL IA: 4 UNITS (ref 0–19)

## 2018-06-04 DIAGNOSIS — Z79.890 POSTMENOPAUSAL HRT (HORMONE REPLACEMENT THERAPY): ICD-10-CM

## 2018-06-04 RX ORDER — ESTRADIOL 0.05 MG/D
PATCH, EXTENDED RELEASE TRANSDERMAL
Qty: 8 PATCH | Refills: 3 | Status: SHIPPED | OUTPATIENT
Start: 2018-06-04 | End: 2018-10-02 | Stop reason: SDUPTHER

## 2018-09-16 DIAGNOSIS — F33.41 RECURRENT MAJOR DEPRESSIVE DISORDER, IN PARTIAL REMISSION (HCC): ICD-10-CM

## 2018-09-17 RX ORDER — ESCITALOPRAM OXALATE 20 MG/1
TABLET ORAL
Qty: 30 TAB | Refills: 0 | Status: SHIPPED | OUTPATIENT
Start: 2018-09-17 | End: 2018-10-15 | Stop reason: SDUPTHER

## 2018-09-19 ENCOUNTER — APPOINTMENT (OUTPATIENT)
Dept: RADIOLOGY | Facility: IMAGING CENTER | Age: 53
End: 2018-09-19
Attending: PHYSICIAN ASSISTANT
Payer: COMMERCIAL

## 2018-09-19 ENCOUNTER — OFFICE VISIT (OUTPATIENT)
Dept: URGENT CARE | Facility: CLINIC | Age: 53
End: 2018-09-19
Payer: COMMERCIAL

## 2018-09-19 VITALS
SYSTOLIC BLOOD PRESSURE: 124 MMHG | BODY MASS INDEX: 41.95 KG/M2 | HEIGHT: 70 IN | WEIGHT: 293 LBS | DIASTOLIC BLOOD PRESSURE: 84 MMHG | OXYGEN SATURATION: 96 % | HEART RATE: 84 BPM | TEMPERATURE: 98.4 F | RESPIRATION RATE: 20 BRPM

## 2018-09-19 DIAGNOSIS — S99.912A INJURY OF LEFT ANKLE, INITIAL ENCOUNTER: ICD-10-CM

## 2018-09-19 DIAGNOSIS — S96.912A STRAIN OF LEFT ANKLE, INITIAL ENCOUNTER: Primary | ICD-10-CM

## 2018-09-19 PROCEDURE — 73610 X-RAY EXAM OF ANKLE: CPT | Mod: 26,LT | Performed by: PHYSICIAN ASSISTANT

## 2018-09-19 PROCEDURE — 99214 OFFICE O/P EST MOD 30 MIN: CPT | Performed by: PHYSICIAN ASSISTANT

## 2018-09-19 RX ORDER — HYDROCODONE BITARTRATE AND ACETAMINOPHEN 5; 325 MG/1; MG/1
1 TABLET ORAL EVERY 8 HOURS PRN
Qty: 8 TAB | Refills: 0 | Status: SHIPPED | OUTPATIENT
Start: 2018-09-19 | End: 2018-09-22

## 2018-09-19 ASSESSMENT — ENCOUNTER SYMPTOMS
NAUSEA: 0
VOMITING: 0
FOCAL WEAKNESS: 0
MYALGIAS: 0
LOSS OF SENSATION: 0
CHILLS: 0
INABILITY TO BEAR WEIGHT: 0
ROS SKIN COMMENTS: NO BRUISING OR REDNESS
LOSS OF MOTION: 1
TINGLING: 0
FEVER: 0
SENSORY CHANGE: 0
NUMBNESS: 0

## 2018-09-19 ASSESSMENT — PAIN SCALES - GENERAL: PAINLEVEL: 10=SEVERE PAIN

## 2018-09-19 NOTE — PROGRESS NOTES
Subjective:      Latia Almanza is a 53 y.o. female who presents with Ankle Injury (left, fell outside a restaurant last night. )            Ankle Injury    Incident onset: Last night- Patient was walking outside of a restaurant- stepped on uneven surface and rolled left ankle. The injury mechanism was an inversion injury. The pain is present in the left ankle. The pain is severe. The pain has been constant since onset. Associated symptoms include a loss of motion. Pertinent negatives include no inability to bear weight (difficulty bearing weight but able to bear some weight ), loss of sensation, numbness or tingling. She reports no foreign bodies present. The symptoms are aggravated by palpation, weight bearing and movement. She has tried nothing for the symptoms.     Past Medical History:   Diagnosis Date   • Allergic rhinitis    • Anxiety    • Back pain     back/ right hip   • Bronchitis 5/2016   • Dental disorder     upper implants and bridge   • Obesity    • Pneumonia 5/2016   • Restless leg syndrome    • Sarcoidosis (HCC)    • Sciatica    • Sleep apnea     CPAP   • Snoring        Past Surgical History:   Procedure Laterality Date   • GASTROSCOPY N/A 5/9/2018    Procedure: GASTROSCOPY;  Surgeon: Martin Castillo M.D.;  Location: SURGERY SAME DAY [facility] ORS;  Service: Gastroenterology   • LUMBAR FUSION ANTERIOR  10/27/2016    Procedure: LUMBAR FUSION ANTERIOR L5-S1 ALIF;  Surgeon: Ronal [place] III, M.D.;  Location: SURGERY [facility];  Service:    • ABDOMINAL HYSTERECTOMY TOTAL     • ATHROPLASTY     • HIP ARTHROPLASTY TOTAL     • HIP REPLACEMENT, TOTAL     • HYSTERECTOMY LAPAROSCOPY     • LAPAROTOMY     • OTHER      dental implants   • OTHER ORTHOPEDIC SURGERY      R hip replacement March 2008   • SINUSCOPE         Family History   Problem Relation Age of Onset   • Lung Disease Mother         COPD, asthma   • Diabetes Mother    • Diabetes Father    • Heart Disease Father        No Known  "Allergies    Medications, Allergies, and current problem list reviewed today in Epic      Review of Systems   Constitutional: Negative for chills, fever and malaise/fatigue.   Gastrointestinal: Negative for nausea and vomiting.   Musculoskeletal: Positive for joint pain (left ankle pain, swelling ). Negative for myalgias.   Skin: Negative for rash.        No bruising or redness    Neurological: Negative for tingling, sensory change, focal weakness and numbness.     All other systems reviewed and are negative.        Objective:     /84 (BP Location: Right arm, Patient Position: Sitting, BP Cuff Size: Adult long)   Pulse 84   Temp 36.9 °C (98.4 °F) (Temporal)   Resp 20   Ht 1.778 m (5' 10\")   Wt (!) 147.4 kg (325 lb)   SpO2 96%   BMI 46.63 kg/m²      Physical Exam   Constitutional: She is oriented to person, place, and time. She appears well-developed. No distress.   obese   Pulmonary/Chest: Effort normal. No respiratory distress.   Musculoskeletal:        Left ankle: She exhibits decreased range of motion (marked limitation with ROM due to pain  ) and swelling (moderate swelling noted over medial and lateral aspect of ankle). She exhibits no ecchymosis, no deformity and normal pulse. Tenderness. Lateral malleolus, medial malleolus and AITFL tenderness found. No CF ligament, no posterior TFL, no head of 5th metatarsal and no proximal fibula tenderness found. Achilles tendon normal. Achilles tendon exhibits no pain and no defect.   Neurological: She is alert and oriented to person, place, and time.   Skin: Skin is warm and dry.   Psychiatric: She has a normal mood and affect. Her behavior is normal. Judgment and thought content normal.           9/19/2018 10:17 AM    HISTORY/REASON FOR EXAM:  Left lateral ankle pain.    TECHNIQUE/EXAM DESCRIPTION AND NUMBER OF VIEWS: 3 views of the LEFT ankle.    COMPARISON: None    FINDINGS:  Bone mineralization is normal. There is no evidence of fracture or dislocation. " There is lateral and medial soft tissue swelling seen. The ankle mortise is well-maintained.     Impression       No evidence of acute fracture or dislocation.            Assessment/Plan:     1. Strain of left ankle, initial encounter  DX-ANKLE 3+ VIEWS LEFT    HYDROcodone-acetaminophen (NORCO) 5-325 MG Tab per tablet    Consent for Opiate Prescription    REFERRAL TO [facility]       - DX-ANKLE 3+ VIEWS LEFT; Future    Moderate-severe left ankle strain.    X-ray negative for fracture.  RICE, NSAIDS.    Current Outpatient Prescriptions:   •  HYDROcodone-acetaminophen (NORCO) 5-325 MG Tab per tablet, Take 1 Tab by mouth every 8 hours as needed for up to 3 days., Disp: 8 Tab, Rfl: 0  Sedation side effects discussed. No Driving or alcohol with medication given.    - Opioid risk tool utilized. No concerns.    Follow-up with [facility]    [place]  Aware web site evaluation: I have obtained and reviewed patient utilization report from [facility] pharmacy database prior to writing prescription for controlled substance II, III or IV per [place] bill . Based on the report and my clinical assessment the prescription is medically necessary.     Differential diagnoses, Supportive care, and indications for immediate follow-up discussed with patient.   Instructed to return to clinic or nearest emergency department for any change in condition, further concerns, or worsening of symptoms.    The patient demonstrated a good understanding and agreed with the treatment plan.    Lori Patel P.A.-C.

## 2018-09-25 ENCOUNTER — OFFICE VISIT (OUTPATIENT)
Dept: HEALTH INFORMATION MANAGEMENT | Facility: MEDICAL CENTER | Age: 53
End: 2018-09-25
Payer: COMMERCIAL

## 2018-09-25 VITALS
BODY MASS INDEX: 41.95 KG/M2 | WEIGHT: 293 LBS | HEIGHT: 70 IN | SYSTOLIC BLOOD PRESSURE: 132 MMHG | OXYGEN SATURATION: 92 % | DIASTOLIC BLOOD PRESSURE: 84 MMHG | HEART RATE: 90 BPM

## 2018-09-25 DIAGNOSIS — E11.69 TYPE 2 DIABETES MELLITUS WITH OTHER SPECIFIED COMPLICATION, WITHOUT LONG-TERM CURRENT USE OF INSULIN (HCC): ICD-10-CM

## 2018-09-25 DIAGNOSIS — F33.41 RECURRENT MAJOR DEPRESSIVE DISORDER, IN PARTIAL REMISSION (HCC): ICD-10-CM

## 2018-09-25 DIAGNOSIS — R63.5 WEIGHT GAIN: ICD-10-CM

## 2018-09-25 DIAGNOSIS — R63.2 BINGE EATING: ICD-10-CM

## 2018-09-25 DIAGNOSIS — D86.9 SARCOIDOSIS: ICD-10-CM

## 2018-09-25 PROCEDURE — 97802 MEDICAL NUTRITION INDIV IN: CPT | Performed by: DIETITIAN, REGISTERED

## 2018-09-25 PROCEDURE — 99205 OFFICE O/P NEW HI 60 MIN: CPT | Performed by: INTERNAL MEDICINE

## 2018-09-25 RX ORDER — BUPROPION HYDROCHLORIDE 75 MG/1
75 TABLET ORAL 2 TIMES DAILY
Qty: 60 TAB | Refills: 1 | Status: SHIPPED | OUTPATIENT
Start: 2018-09-25 | End: 2018-11-05
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
## 2018-09-25 NOTE — PROGRESS NOTES
"9/25/2018   Referring Provider: MATTHEW Hawkins 53 y.o.        Time in/out: 10:07-10:36am    Anthropometrics/Objective  Vitals:    09/25/18 0927   BP: 132/84   Weight: (!) 144.3 kg (318 lb 1.6 oz)   Height: 1.778 m (5' 10\")     BMI: Body mass index is 45.64 kg/m².  Stated Goal Weight: weight loss of 30-40 lbs in next 6 months    See comprehensive patient history form for further information     Subjective:  Pt is here today for the initial screening visit for the medical weight management program.      - She is seeing Dr. Garcia for gastric sleeve. 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 She does work from home rest of the week   - She just moved to a new house with a kitchen she would like to cook in, but hasn't started   - Hx of high dose steroids   - Positive for depression   - Used [employer] in the past, trying to eat 1200 kcals. This was short lived and she went back to her old eating habits   - Not exercising, recently sprained ankle    B - coffee w/ heavy whipping cream, no sugar  L - Fast food; [employer] or [employer] w/ soda, regular  D - Fast food  S - chips  B - regular soda, water, beer (3)    See Medical Questionnaire for more detailed diet history     Nutrition Diagnosis (PES Statement)  · Obesity related to excessive energy intake and inadequate energy expenditure as evidenced by BMI >30     Client history:  Condition(s) associated with a diagnosis or treatment (specify): JOSE RAMON, hx of CONY, postmenopausal HRT, 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 DM being treated w/ Victoza.    Biochemical data, medical test and procedures  Lab Results   Component Value Date/Time    HBA1C 6.2 (H) 05/08/2018 02:19 PM     Lab Results   Component Value Date/Time    POCGLUCOSE 109 (H) 05/09/2018 11:02 AM "     Lab Results   Component Value Date/Time    CHOLSTRLTOT 185 05/08/2018 02:19 PM     (H) 05/08/2018 02:19 PM    HDL 68 05/08/2018 02:19 PM    TRIGLYCERIDE 70 05/08/2018 02:19 PM         Nutrition Intervention  Nutrition Prescription  Recommended Daily Kcals: 1800 Kcal based on REE of 2019   Recommended Daily Protein: 100g or more per Dr. Tinoco  Recommended Daily CHO: 100g or less per Dr. Tinoco     Meal Plan Recommendation   Low calorie, high protein/low CHO diet with 1 meal replacements per day. Opted to not use Robard. Will use for breakfast.     Comprehensive Nutrition Education Instruction or training leading to in-depth nutrition related knowledge about:   Benefits to following meal plan, Combine carb, protein and fat at each meal, Meal timing and spacing, Portion control, Sweets and alcohol in moderation, and tracking intake using [employer] and why we are recommending her protein intake to be increased and CHO portions to be decreased.    Handouts provided regarding topics discussed     - LCD Goal sheet   - MyPlate   - Snack list (w/o fruit)   - MyFitnessPal Guide and Recipe How-To   - Low CHO meal ideas   - Protein bar snack handout   - Meal replacement shake handout    Monitoring & Evaluation Plan    Behavioral-Environmental:  Behavior: Keep a food journal and bring to next appointment   Physical activity: Did not discuss    Food / Nutrient Intake:  Food intake: Follow meal plan as discussed. Avoid concentrated sweets and processed carbs. Use the plate method for portion control and macronutrient balance. Limit carbs/starch to up to 0-1/2 cup per meal. Snacks should be 1oz protein + ns veggies.     Fluid intake: Consume at least 64 oz water per day. Avoid all sweetened beverages. Limit coffee to 1 cup a day (ideally no cream or sugar). Avoid alcohol. She is going to cut out soda in the next 2 weeks. She is not ready to change the heavy whipping cream in her coffee in the AM.      Physical Signs / Symptoms:  HbA1c profiles < 7.0%, Lipid profiles WNL and Weight loss of 1-2 lbs per week towards goal weight      Assessment Notes:   Cat Jeffery oriented to the MWM program today. She will start tracking with Yue. She will start using a high protein MR once daily for breakfast. We set a goal to decrease her dining out by at least half (so no more than 6 times per week). She is concerned regarding the sodium in the meal replacements; but pointed out to her it is far less than fast food and overall her sodium intake will likely go down. She will also work on eliminating soda from her diet in these first two weeks. For tracking, she will first work on her calories, then shift her focus to the CHO and proteins if she is ready, or we can do this at f/u.     Follow up 2 weeks w/ ARYAN and 1 month w/ MD

## 2018-10-02 DIAGNOSIS — Z79.890 POSTMENOPAUSAL HRT (HORMONE REPLACEMENT THERAPY): ICD-10-CM

## 2018-10-02 RX ORDER — ESTRADIOL 0.05 MG/D
PATCH, EXTENDED RELEASE TRANSDERMAL
Qty: 8 PATCH | Refills: 2 | Status: SHIPPED | OUTPATIENT
Start: 2018-10-02 | End: 2018-12-22 | Stop reason: SDUPTHER

## 2018-10-09 ENCOUNTER — APPOINTMENT (OUTPATIENT)
Dept: HEALTH INFORMATION MANAGEMENT | Facility: MEDICAL CENTER | Age: 53
End: 2018-10-09
Payer: COMMERCIAL

## 2018-10-11 ENCOUNTER — APPOINTMENT (OUTPATIENT)
Dept: ADMISSIONS | Facility: MEDICAL CENTER | Age: 53
End: 2018-10-11
Payer: COMMERCIAL

## 2018-10-12 ENCOUNTER — HOSPITAL ENCOUNTER (OUTPATIENT)
Dept: LAB | Facility: MEDICAL CENTER | Age: 53
End: 2018-10-12
Attending: INTERNAL MEDICINE
Payer: COMMERCIAL

## 2018-10-12 ENCOUNTER — APPOINTMENT (OUTPATIENT)
Dept: ADMISSIONS | Facility: MEDICAL CENTER | Age: 53
End: 2018-10-12
Attending: ORTHOPAEDIC SURGERY
Payer: COMMERCIAL

## 2018-10-12 DIAGNOSIS — R63.5 WEIGHT GAIN: ICD-10-CM

## 2018-10-12 DIAGNOSIS — F33.41 RECURRENT MAJOR DEPRESSIVE DISORDER, IN PARTIAL REMISSION (HCC): ICD-10-CM

## 2018-10-12 LAB — TSH SERPL DL<=0.005 MIU/L-ACNC: 1.42 UIU/ML (ref 0.38–5.33)

## 2018-10-12 PROCEDURE — 36415 COLL VENOUS BLD VENIPUNCTURE: CPT

## 2018-10-12 PROCEDURE — 84443 ASSAY THYROID STIM HORMONE: CPT

## 2018-10-12 RX ORDER — TIOTROPIUM BROMIDE 18 UG/1
18 CAPSULE ORAL; RESPIRATORY (INHALATION) 2 TIMES DAILY
COMMUNITY
End: 2018-12-10

## 2018-10-15 ENCOUNTER — HOSPITAL ENCOUNTER (EMERGENCY)
Facility: MEDICAL CENTER | Age: 53
End: 2018-10-16
Attending: EMERGENCY MEDICINE
Payer: COMMERCIAL

## 2018-10-15 ENCOUNTER — APPOINTMENT (OUTPATIENT)
Dept: RADIOLOGY | Facility: MEDICAL CENTER | Age: 53
End: 2018-10-15
Attending: EMERGENCY MEDICINE
Payer: COMMERCIAL

## 2018-10-15 DIAGNOSIS — J45.21 MILD INTERMITTENT ASTHMA WITH ACUTE EXACERBATION: ICD-10-CM

## 2018-10-15 DIAGNOSIS — F33.41 RECURRENT MAJOR DEPRESSIVE DISORDER, IN PARTIAL REMISSION (HCC): ICD-10-CM

## 2018-10-15 LAB
BASOPHILS # BLD AUTO: 0.4 % (ref 0–1.8)
BASOPHILS # BLD: 0.03 K/UL (ref 0–0.12)
EOSINOPHIL # BLD AUTO: 0.12 K/UL (ref 0–0.51)
EOSINOPHIL NFR BLD: 1.7 % (ref 0–6.9)
ERYTHROCYTE [DISTWIDTH] IN BLOOD BY AUTOMATED COUNT: 48.1 FL (ref 35.9–50)
HCT VFR BLD AUTO: 38.6 % (ref 37–47)
HGB BLD-MCNC: 12.7 G/DL (ref 12–16)
IMM GRANULOCYTES # BLD AUTO: 0.01 K/UL (ref 0–0.11)
IMM GRANULOCYTES NFR BLD AUTO: 0.1 % (ref 0–0.9)
LYMPHOCYTES # BLD AUTO: 2.63 K/UL (ref 1–4.8)
LYMPHOCYTES NFR BLD: 37.9 % (ref 22–41)
MCH RBC QN AUTO: 29.1 PG (ref 27–33)
MCHC RBC AUTO-ENTMCNC: 32.9 G/DL (ref 33.6–35)
MCV RBC AUTO: 88.3 FL (ref 81.4–97.8)
MONOCYTES # BLD AUTO: 0.52 K/UL (ref 0–0.85)
MONOCYTES NFR BLD AUTO: 7.5 % (ref 0–13.4)
NEUTROPHILS # BLD AUTO: 3.63 K/UL (ref 2–7.15)
NEUTROPHILS NFR BLD: 52.4 % (ref 44–72)
NRBC # BLD AUTO: 0 K/UL
NRBC BLD-RTO: 0 /100 WBC
PLATELET # BLD AUTO: 323 K/UL (ref 164–446)
PMV BLD AUTO: 9.1 FL (ref 9–12.9)
RBC # BLD AUTO: 4.37 M/UL (ref 4.2–5.4)
WBC # BLD AUTO: 6.9 K/UL (ref 4.8–10.8)

## 2018-10-15 PROCEDURE — 80053 COMPREHEN METABOLIC PANEL: CPT

## 2018-10-15 PROCEDURE — 71045 X-RAY EXAM CHEST 1 VIEW: CPT

## 2018-10-15 PROCEDURE — 85025 COMPLETE CBC W/AUTO DIFF WBC: CPT

## 2018-10-15 PROCEDURE — 304561 HCHG STAT O2

## 2018-10-15 PROCEDURE — 84484 ASSAY OF TROPONIN QUANT: CPT

## 2018-10-15 PROCEDURE — 700111 HCHG RX REV CODE 636 W/ 250 OVERRIDE (IP): Performed by: EMERGENCY MEDICINE

## 2018-10-15 PROCEDURE — 94640 AIRWAY INHALATION TREATMENT: CPT

## 2018-10-15 PROCEDURE — 96374 THER/PROPH/DIAG INJ IV PUSH: CPT

## 2018-10-15 PROCEDURE — 93005 ELECTROCARDIOGRAM TRACING: CPT | Performed by: EMERGENCY MEDICINE

## 2018-10-15 PROCEDURE — 700101 HCHG RX REV CODE 250: Performed by: EMERGENCY MEDICINE

## 2018-10-15 PROCEDURE — 99284 EMERGENCY DEPT VISIT MOD MDM: CPT

## 2018-10-15 RX ORDER — METHYLPREDNISOLONE SODIUM SUCCINATE 125 MG/2ML
125 INJECTION, POWDER, LYOPHILIZED, FOR SOLUTION INTRAMUSCULAR; INTRAVENOUS ONCE
Status: COMPLETED | OUTPATIENT
Start: 2018-10-15 | End: 2018-10-15

## 2018-10-15 RX ORDER — ESCITALOPRAM OXALATE 20 MG/1
TABLET ORAL
Qty: 30 TAB | Refills: 0 | Status: SHIPPED | OUTPATIENT
Start: 2018-10-15 | End: 2018-12-11

## 2018-10-15 RX ADMIN — METHYLPREDNISOLONE SODIUM SUCCINATE 125 MG: 125 INJECTION, POWDER, FOR SOLUTION INTRAMUSCULAR; INTRAVENOUS at 23:17

## 2018-10-15 RX ADMIN — ALBUTEROL SULFATE 2.5 MG: 2.5 SOLUTION RESPIRATORY (INHALATION) at 22:46

## 2018-10-15 ASSESSMENT — ENCOUNTER SYMPTOMS
FOCAL WEAKNESS: 0
ABDOMINAL PAIN: 0
NECK PAIN: 0
COUGH: 1
CARDIOVASCULAR NEGATIVE: 1
FLANK PAIN: 0
GASTROINTESTINAL NEGATIVE: 1
BLOOD IN STOOL: 0
HEADACHES: 0
EYE PAIN: 0
SEIZURES: 0
SHORTNESS OF BREATH: 1
WEAKNESS: 0
WHEEZING: 1
CONSTITUTIONAL NEGATIVE: 1
SORE THROAT: 0
SPUTUM PRODUCTION: 0
HEMOPTYSIS: 0
HALLUCINATIONS: 0
EYES NEGATIVE: 1
MYALGIAS: 0
BRUISES/BLEEDS EASILY: 0
FEVER: 0
BACK PAIN: 0

## 2018-10-15 ASSESSMENT — PAIN SCALES - GENERAL: PAINLEVEL_OUTOF10: 0

## 2018-10-15 NOTE — OR NURSING
Dr. Montiel: I think that the HgbA1C shows adequate Co tell of her diabetes which is surprising given a very high BMI.  There is nothing further to add from my standpoint.  Thank you.

## 2018-10-16 VITALS
DIASTOLIC BLOOD PRESSURE: 55 MMHG | HEART RATE: 79 BPM | OXYGEN SATURATION: 91 % | BODY MASS INDEX: 41.95 KG/M2 | SYSTOLIC BLOOD PRESSURE: 127 MMHG | HEIGHT: 70 IN | WEIGHT: 293 LBS | RESPIRATION RATE: 14 BRPM | TEMPERATURE: 98 F

## 2018-10-16 LAB
ALBUMIN SERPL BCP-MCNC: 4.3 G/DL (ref 3.2–4.9)
ALBUMIN/GLOB SERPL: 1.6 G/DL
ALP SERPL-CCNC: 85 U/L (ref 30–99)
ALT SERPL-CCNC: 36 U/L (ref 2–50)
ANION GAP SERPL CALC-SCNC: 9 MMOL/L (ref 0–11.9)
AST SERPL-CCNC: 27 U/L (ref 12–45)
BILIRUB SERPL-MCNC: 0.2 MG/DL (ref 0.1–1.5)
BUN SERPL-MCNC: 14 MG/DL (ref 8–22)
CALCIUM SERPL-MCNC: 9.1 MG/DL (ref 8.4–10.2)
CHLORIDE SERPL-SCNC: 108 MMOL/L (ref 96–112)
CO2 SERPL-SCNC: 20 MMOL/L (ref 20–33)
CREAT SERPL-MCNC: 1.18 MG/DL (ref 0.5–1.4)
GLOBULIN SER CALC-MCNC: 2.7 G/DL (ref 1.9–3.5)
GLUCOSE SERPL-MCNC: 128 MG/DL (ref 65–99)
POTASSIUM SERPL-SCNC: 3.6 MMOL/L (ref 3.6–5.5)
PROT SERPL-MCNC: 7 G/DL (ref 6–8.2)
SODIUM SERPL-SCNC: 137 MMOL/L (ref 135–145)
TROPONIN I SERPL-MCNC: <0.02 NG/ML (ref 0–0.04)

## 2018-10-16 PROCEDURE — 700101 HCHG RX REV CODE 250

## 2018-10-16 PROCEDURE — 700111 HCHG RX REV CODE 636 W/ 250 OVERRIDE (IP)

## 2018-10-16 RX ORDER — PREDNISONE 20 MG/1
40 TABLET ORAL DAILY
Qty: 10 TAB | Refills: 0 | Status: SHIPPED | OUTPATIENT
Start: 2018-10-16 | End: 2018-10-21

## 2018-10-16 NOTE — ED PROVIDER NOTES
ED Provider Note    CHIEF COMPLAINT  Chief Complaint   Patient presents with   • Shortness of Breath       HPI  HPI  53-year-old female with past medical history of asthma presents with a chief complaint of shortness of breath for the last 4-5 days.  Pt reports +SOB and increased inhailer usage during this time.  Patient denies any chest pain or chest tightness.  Pt states that she has felt flu like sx for 4-5 days.    Denies vomiting or diarrhea  Patient denies any rash.  Patient denies any neck pain or pain with range of motion of neck.  Patient denies any sore throat.  Patient states that this feels like prior asthma exacerbation.  Patient denies any new exposures.  Patient denies any history of DVT or PE.  Patient denies any unilateral leg swelling or leg edema.  Patient denies any recent surgeries.  Patient denies hemoptysis or abdominal pain.    REVIEW OF SYSTEMS  Review of Systems   Constitutional: Negative.  Negative for fever.   HENT: Negative.  Negative for ear pain and sore throat.    Eyes: Negative.  Negative for pain.   Respiratory: Positive for cough, shortness of breath and wheezing. Negative for hemoptysis and sputum production.    Cardiovascular: Negative.  Negative for chest pain.   Gastrointestinal: Negative.  Negative for abdominal pain and blood in stool.   Genitourinary: Negative for dysuria and flank pain.   Musculoskeletal: Negative for back pain, myalgias and neck pain.   Skin: Negative.  Negative for rash.   Neurological: Negative for focal weakness, seizures, weakness and headaches.   Endo/Heme/Allergies: Does not bruise/bleed easily.   Psychiatric/Behavioral: Negative for hallucinations and suicidal ideas.   All other systems reviewed and are negative.      PAST MEDICAL HISTORY   has a past medical history of Allergic rhinitis; Anxiety; Asthma; Back pain; Bronchitis (5/2016); Dental disorder; Fatty liver; Heart burn; Heart murmur; Obesity; Pneumonia (5/2016); Restless leg syndrome;  "Sarcoidosis; Sciatica; Sleep apnea; and Snoring.    SOCIAL HISTORY  Social History     Social History Main Topics   • Smoking status: Former Smoker     Packs/day: 0.25     Years: 5.00     Types: Cigarettes     Quit date: 1/20/2001   • Smokeless tobacco: Never Used   • Alcohol use Yes      Comment: 14 per week   • Drug use: No   • Sexual activity: Not on file       SURGICAL HISTORY   has a past surgical history that includes abdominal hysterectomy total (2007); hip arthroplasty total (Right, 2008); laparotomy (2000); sinuscope (2001); other (2017); other orthopedic surgery (Right, 2008); lumbar fusion anterior (10/27/2016); and gastroscopy (N/A, 5/9/2018).    CURRENT MEDICATIONS  Home Medications     Reviewed by Lori Alvarado R.N. (Registered Nurse) on 10/15/18 at 0039  Med List Status: Complete   Medication Last Dose Status   albuterol (PROVENTIL) 2.5mg/3ml Nebu Soln solution for nebulization taking Active   albuterol 108 (90 Base) MCG/ACT Aero Soln inhalation aerosol 10/15/2018 Active   buPROPion (WELLBUTRIN) 75 MG Tab 10/15/2018 Active   escitalopram (LEXAPRO) 20 MG tablet 10/15/2018 Active   estradiol (VIVELLE DOT) 0.05 MG/24HR PATCH BIWEEKLY 10/15/2018 Active   fluticasone-salmeterol (ADVAIR DISKUS) 250-50 MCG/DOSE AEROSOL POWDER, BREATH ACTIVATED 10/15/2018 Active   omeprazole (PRILOSEC) 20 MG delayed-release capsule 10/15/2018 Active   tiotropium (SPIRIVA HANDIHALER) 18 MCG Cap 10/15/2018 Active                ALLERGIES  No Known Allergies    PHYSICAL EXAM  VITAL SIGNS: /55   Pulse 79   Temp 36.7 °C (98 °F)   Resp 14   Ht 1.778 m (5' 10\")   Wt (!) 147 kg (324 lb 1.2 oz)   SpO2 91%   BMI 46.50 kg/m²    Pulse ox interpretation: I interpret this pulse ox as normal.    Physical Exam   Constitutional: She is oriented to person, place, and time and well-developed, well-nourished, and in no distress.   HENT:   Head: Normocephalic and atraumatic.   Right Ear: External ear normal.   Left Ear: External " ear normal.   Eyes: Conjunctivae and EOM are normal. No scleral icterus.   Neck: Normal range of motion.   Cardiovascular: Normal rate.    Pulmonary/Chest: Effort normal. No stridor. No respiratory distress. She has wheezes.   Abdominal: She exhibits no distension. There is no tenderness.   Musculoskeletal: Normal range of motion. She exhibits no edema or deformity.   Neurological: She is alert and oriented to person, place, and time. Coordination normal.   Skin: Skin is warm and dry. No rash noted. No erythema.   Psychiatric: Affect and judgment normal.       DIAGNOSTIC STUDIES / PROCEDURES    LABS/EKG  Results for orders placed or performed during the hospital encounter of 10/15/18   CBC w/ Differential   Result Value Ref Range    WBC 6.9 4.8 - 10.8 K/uL    RBC 4.37 4.20 - 5.40 M/uL    Hemoglobin 12.7 12.0 - 16.0 g/dL    Hematocrit 38.6 37.0 - 47.0 %    MCV 88.3 81.4 - 97.8 fL    MCH 29.1 27.0 - 33.0 pg    MCHC 32.9 (L) 33.6 - 35.0 g/dL    RDW 48.1 35.9 - 50.0 fL    Platelet Count 323 164 - 446 K/uL    MPV 9.1 9.0 - 12.9 fL    Neutrophils-Polys 52.40 44.00 - 72.00 %    Lymphocytes 37.90 22.00 - 41.00 %    Monocytes 7.50 0.00 - 13.40 %    Eosinophils 1.70 0.00 - 6.90 %    Basophils 0.40 0.00 - 1.80 %    Immature Granulocytes 0.10 0.00 - 0.90 %    Nucleated RBC 0.00 /100 WBC    Neutrophils (Absolute) 3.63 2.00 - 7.15 K/uL    Lymphs (Absolute) 2.63 1.00 - 4.80 K/uL    Monos (Absolute) 0.52 0.00 - 0.85 K/uL    Eos (Absolute) 0.12 0.00 - 0.51 K/uL    Baso (Absolute) 0.03 0.00 - 0.12 K/uL    Immature Granulocytes (abs) 0.01 0.00 - 0.11 K/uL    NRBC (Absolute) 0.00 K/uL   Complete Metabolic Panel (CMP)   Result Value Ref Range    Sodium 137 135 - 145 mmol/L    Potassium 3.6 3.6 - 5.5 mmol/L    Chloride 108 96 - 112 mmol/L    Co2 20 20 - 33 mmol/L    Anion Gap 9.0 0.0 - 11.9    Glucose 128 (H) 65 - 99 mg/dL    Bun 14 8 - 22 mg/dL    Creatinine 1.18 0.50 - 1.40 mg/dL    Calcium 9.1 8.4 - 10.2 mg/dL    AST(SGOT) 27 12 - 45  U/L    ALT(SGPT) 36 2 - 50 U/L    Alkaline Phosphatase 85 30 - 99 U/L    Total Bilirubin 0.2 0.1 - 1.5 mg/dL    Albumin 4.3 3.2 - 4.9 g/dL    Total Protein 7.0 6.0 - 8.2 g/dL    Globulin 2.7 1.9 - 3.5 g/dL    A-G Ratio 1.6 g/dL   Troponin STAT   Result Value Ref Range    Troponin I <0.02 0.00 - 0.04 ng/mL   ESTIMATED GFR   Result Value Ref Range    GFR If  58 (A) >60 mL/min/1.73 m 2    GFR If Non  48 (A) >60 mL/min/1.73 m 2   EKG - STAT   Result Value Ref Range    Report       [facility] Emergency Dept.    Test Date:  2018-10-15  Pt Name:    AARON GARCIA              Department[facility]  MRN:        [id number]                      [facility]  Gender:     Female                       Technician:   :        [id number]                   Requested By:ISAAC BARRETO  Order #:    [id number]                    Reading MD:    Measurements  Intervals                                Axis  Rate:       73                           P:          40  KY:         168                          QRS:        5  QRSD:       98                           T:          43  QT:         408  QTc:        450    Interpretive Statements  SINUS RHYTHM  RSR' IN V1 OR V2, RIGHT VCD OR RVH  Compared to ECG 2018 11:12:21  T-wave abnormality no longer present       12 Lead EKG interpreted by me to show:  Normal sinus rhythm  Rate 73  Axis: Normal  Intervals: Normal  Normal T waves  Normal ST segments  My impression of this EKG: No STEMI.  Sinus ECG.   RADIOLOGY  DX-CHEST-PORTABLE (1 VIEW)   Final Result         1.  No acute cardiopulmonary disease.           COURSE & MEDICAL DECISION MAKING  Pertinent Labs & Imaging studies reviewed by me. (See chart for details)    53 y.o. female PMH of asthma and sarcoidosis p/w cough.     Differential diagnosis includes but is not limited to:    Asthma exacerbation: history and physical exam consistent with asthma exacerbation.  Patient given  duonebs and steroids upon arrival with improvement in wheezing and shortness of breath    No chest pain or abnormal tightness to suggest ACS  Trop and ECG unremarkable  No fever or unilateral breath sounds to suggest pneumonia and no e/o pna on xr  No unilateral leg swelling, hemoptysis, history of DVT or PE, or family history of hypercoagulability to suggest PE.  CTA obtained approximately 1 year ago with no evidence of pulmonary embolism at that time.     Patient prescribed 5-day course of steroids and plans to follow-up with PCP and call first thing tomorrow morning to establish close follow-up    FINAL IMPRESSION  Visit Diagnoses     ICD-10-CM   1. Mild intermittent asthma with acute exacerbation J45.21              Electronically signed by: Leo Lamas, 10/15/2018 10:37 PM

## 2018-10-16 NOTE — ED NOTES
IV placed, flushes with ease, no pain or blanching noted. Unable to get blood return. Pt reports 'tasting saline' with saline flush.

## 2018-10-16 NOTE — FLOWSHEET NOTE
10/15/18 2247   Interdisciplinary Plan of Care-Goals (Indications)   Bronchodilator Indications History / Diagnosis   Interdisciplinary Plan of Care-Outcomes    Bronchodilator Outcome Diminished Wheezing and Volume of Air Movement Increased   Education   Education Yes - Pt. / Family has been Instructed in use of Respiratory Equipment;Yes - Pt. / Family has been Instructed in use of Respiratory Medications and Adverse Reactions   SVN Group   #SVN Performed Yes   Given By: Mouthpiece   Respiratory WDL   Respiratory (WDL) X   Chest Exam   Respiration (!) 22   Pulse 67   Breath Sounds   Pre/Post Intervention Pre Intervention Assessment   RUL Breath Sounds Diminished   RML Breath Sounds Diminished   RLL Breath Sounds Diminished   AYDEN Breath Sounds Diminished   LLL Breath Sounds Diminished   Oxygen   Pulse Oximetry 99 %   O2 (LPM) 0   FiO2% 21 %

## 2018-10-16 NOTE — ED TRIAGE NOTES
Patient with reports of SOB since earlier today.  States she has used her rescue inhaler 4 times PTA.  Pt denies any pain at this time.  Reports hx of Asthma.

## 2018-10-16 NOTE — ED NOTES
Pt breathing even, unlabored at this time. Reports 'I feel a lot better'. Aware to use call light if shortness of breath returns, or for any other needs. Denies needs or questions at this time. Call light in reach.

## 2018-10-16 NOTE — ED NOTES
Agree with triage, pt reports sob for 'a couple days' but reports it was worse today. Pt coughing - dry and unproductive, reports cough present for 4-5 days.

## 2018-10-16 NOTE — ED NOTES
D/c instructions given to pt. 1 prescription sent to pharmacy. Instructed to f/u with pcp. Return conditions explained. Pt denies questions. Breathing unlabored, even at this time. Pt ambulated out of ED.

## 2018-10-17 ENCOUNTER — HOSPITAL ENCOUNTER (OUTPATIENT)
Facility: MEDICAL CENTER | Age: 53
End: 2018-10-17
Attending: ORTHOPAEDIC SURGERY | Admitting: ORTHOPAEDIC SURGERY
Payer: COMMERCIAL

## 2018-10-17 VITALS
OXYGEN SATURATION: 96 % | DIASTOLIC BLOOD PRESSURE: 80 MMHG | BODY MASS INDEX: 41.95 KG/M2 | HEIGHT: 70 IN | HEART RATE: 61 BPM | WEIGHT: 293 LBS | SYSTOLIC BLOOD PRESSURE: 145 MMHG | TEMPERATURE: 98.6 F | RESPIRATION RATE: 18 BRPM

## 2018-10-17 DIAGNOSIS — M65.311 TRIGGER FINGER OF RIGHT THUMB: ICD-10-CM

## 2018-10-17 PROCEDURE — 160025 RECOVERY II MINUTES (STATS): Performed by: ORTHOPAEDIC SURGERY

## 2018-10-17 PROCEDURE — 501838 HCHG SUTURE GENERAL: Performed by: ORTHOPAEDIC SURGERY

## 2018-10-17 PROCEDURE — 502576 HCHG PACK, HAND: Performed by: ORTHOPAEDIC SURGERY

## 2018-10-17 PROCEDURE — 160021 HCHG LOCAL: Performed by: ORTHOPAEDIC SURGERY

## 2018-10-17 PROCEDURE — A6454 SELF-ADHER BAND W>=3" <5"/YD: HCPCS | Performed by: ORTHOPAEDIC SURGERY

## 2018-10-17 PROCEDURE — 160046 HCHG PACU - 1ST 60 MINS PHASE II: Performed by: ORTHOPAEDIC SURGERY

## 2018-10-17 PROCEDURE — 500881 HCHG PACK, EXTREMITY: Performed by: ORTHOPAEDIC SURGERY

## 2018-10-17 PROCEDURE — 160028 HCHG SURGERY MINUTES - 1ST 30 MINS LEVEL 3: Performed by: ORTHOPAEDIC SURGERY

## 2018-10-17 PROCEDURE — A6402 STERILE GAUZE <= 16 SQ IN: HCPCS | Performed by: ORTHOPAEDIC SURGERY

## 2018-10-17 PROCEDURE — 700101 HCHG RX REV CODE 250

## 2018-10-17 PROCEDURE — 160048 HCHG OR STATISTICAL LEVEL 1-5: Performed by: ORTHOPAEDIC SURGERY

## 2018-10-17 RX ORDER — HYDROCODONE BITARTRATE AND ACETAMINOPHEN 5; 325 MG/1; MG/1
1 TABLET ORAL EVERY 6 HOURS PRN
Qty: 11 TAB | Refills: 0 | Status: SHIPPED | OUTPATIENT
Start: 2018-10-17 | End: 2018-10-20

## 2018-10-17 RX ORDER — SODIUM CHLORIDE, SODIUM LACTATE, POTASSIUM CHLORIDE, CALCIUM CHLORIDE 600; 310; 30; 20 MG/100ML; MG/100ML; MG/100ML; MG/100ML
1000 INJECTION, SOLUTION INTRAVENOUS
Status: DISCONTINUED | OUTPATIENT
Start: 2018-10-17 | End: 2018-10-17 | Stop reason: HOSPADM

## 2018-10-17 RX ORDER — BUPIVACAINE HYDROCHLORIDE AND EPINEPHRINE 5; 5 MG/ML; UG/ML
INJECTION, SOLUTION EPIDURAL; INTRACAUDAL; PERINEURAL
Status: DISCONTINUED | OUTPATIENT
Start: 2018-10-17 | End: 2018-10-17 | Stop reason: HOSPADM

## 2018-10-17 RX ADMIN — SODIUM CHLORIDE, SODIUM LACTATE, POTASSIUM CHLORIDE, CALCIUM CHLORIDE 1000 ML: 600; 310; 30; 20 INJECTION, SOLUTION INTRAVENOUS at 12:19

## 2018-10-17 NOTE — OR NURSING
1200 Pt. Allergies and NPO status verified, home medications reconciled. Belongings secured. Pt. Verbalizes understanding of pain scale, expected course of stay and plan of care. Surgical site verified with pt. Pt. And family given home care instructions for discharge planning. IV access established.

## 2018-10-17 NOTE — OR NURSING
1632: To stage ll from O.R. post right thumb trigger finger release w/ local anesthetic. Pt is awake and alert. No pain or nausea. Dressings are c/d/i.  1659: Home care instructions reviewed w/ pt. No questions. Pt remains pain/nausea free. VSS. Meets criteria for discharge.

## 2018-10-17 NOTE — DISCHARGE INSTRUCTIONS
ACTIVITY: Rest and take it easy for the first 24 hours.  A responsible adult is recommended to remain with you during that time.  It is normal to feel sleepy.  We encourage you to not do anything that requires balance, judgment or coordination.    MILD FLU-LIKE SYMPTOMS ARE NORMAL. YOU MAY EXPERIENCE GENERALIZED MUSCLE ACHES, THROAT IRRITATION, HEADACHE AND/OR SOME NAUSEA.    FOR 24 HOURS DO NOT:  Drive, operate machinery or run household appliances.  Drink beer or alcoholic beverages.   Make important decisions or sign legal documents.    SPECIAL INSTRUCTIONS:   Keep dressings clean, dry and intact   No lifting anything heavier than 10 lbs with the operative hand   May remove dressings after 48 hrs   May begin showering and washing hands as normal after 48 hrs   Do not soak the wound   Keep hand elevated and apply ice as much as possible in the first three days   Do not operate a vehicle or machinery while taking narcotic pain medications   Return to clinic in 10-14 days   Please call the office with any questions [phone]    DIET: To avoid nausea, slowly advance diet as tolerated, avoiding spicy or greasy foods for the first day.  Add more substantial food to your diet according to your physician's instructions.   INCREASE FLUIDS AND FIBER TO AVOID CONSTIPATION.      FOLLOW-UP APPOINTMENT:  A follow-up appointment should be arranged with your doctor in 10-14 days; call to schedule.    You should CALL YOUR PHYSICIAN if you develop:  Fever greater than 101 degrees F.  Pain not relieved by medication, or persistent nausea or vomiting.  Excessive bleeding (blood soaking through dressing) or unexpected drainage from the wound.  Extreme redness or swelling around the incision site, drainage of pus or foul smelling drainage.  Inability to urinate or empty your bladder within 8 hours.    You should call 911 if you develop problems with breathing or chest pain.    If you are unable to contact your doctor or surgical  center, you should go to the nearest emergency room or urgent care center.      Physician's telephone #: Dr. Yung [phone]    If any questions arise, call your doctor.  If your doctor is not available, please feel free to call the Surgical Center at [phone].  The Center is open Monday through Friday from 7AM to 7PM.      You can also call the HEALTH HOTLINE open 24 hours/day, 7 days/week and speak to a nurse at [phone], or toll free at [phone].    A registered nurse may call you a few days after your surgery to see how you are doing after your procedure.    MEDICATIONS: Resume taking daily medication.  Take prescribed pain medication with food.  If no medication is prescribed, you may take non-aspirin pain medication if needed.  PAIN MEDICATION CAN BE VERY CONSTIPATING.  Take a stool softener or laxative such as senokot, pericolace, or milk of magnesia if needed.    Prescription given for norco.      Last pain medication given: none.    If your physician has prescribed pain medication that includes Acetaminophen (Tylenol), do not take additional Acetaminophen (Tylenol) while taking the prescribed medication.    Depression / Suicide Risk    As you are discharged from this [facility] facility, it is important to learn how to keep safe from harming yourself.    Recognize the warning signs:  · Abrupt changes in personality, positive or negative- including increase in energy   · Giving away possessions  · Change in eating patterns- significant weight changes-  positive or negative  · Change in sleeping patterns- unable to sleep or sleeping all the time   · Unwillingness or inability to communicate  · Depression  · Unusual sadness, discouragement and loneliness  · Talk of wanting to die  · Neglect of personal appearance   · Rebelliousness- reckless behavior  · Withdrawal from people/activities they love  · Confusion- inability to concentrate     If you or a loved one observes any of these  behaviors or has concerns about self-harm, here's what you can do:  · Talk about it- your feelings and reasons for harming yourself  · Remove any means that you might use to hurt yourself (examples: pills, rope, extension cords, firearm)  · Get professional help from the community (Mental Health, Substance Abuse, psychological counseling)  · Do not be alone:Call your Safe Contact- someone whom you trust who will be there for you.  · Call your local CRISIS HOTLINE 821-1316 or 097-028-1721  · Call your local Children's Mobile Crisis Response Team [place] (742) 917-5484 or www.theDrop  · Call the toll free National Suicide Prevention Hotlines   · National Suicide Prevention Lifeline 858-878-PKKP (2208)  · National Hope Line Network 800-SUICIDE (206-3002)

## 2018-10-17 NOTE — OP REPORT
Date of surgery: 10/17/2018    Diagnosis: Right trigger thumb    Postoperative diagnosis: Right trigger thumb    Surgery performed: Right thumb A1 pulley release.    Surgeon: Houston Yung M.D.    Anesthesia: Local    Tourniquet time: 4 minutes    Tourniquet pressure: 250 mmHg    Complications: None    Indications for procedure: The patient has had persistent right thumb trigger finger and has tried rest, activity modifications, NSAIDs and steroid injections. All of these have failed to resolve the symptoms. For these reasons the decision was made to take them to the operating room today for the above-mentioned procedure.    Description of procedure: On the day of surgery the patient was seen in the preoperative area where informed consent was obtained with all risks and benefits of the procedure explained and all questions answered. The patient wished to proceed with surgery. The proper site was marked. They were subsequently taken to the operating room and placed in the supine position with all bony prominences well-padded. Local anesthesia was induced with equal parts of 0.5% bupivacaine with epinephrine and 1% lidocaine with epinephrine. A tourniquet was placed on the operative extremity and it was then prepped and draped in the usual sterile fashion.    A timeout was performed without persons in attendance agreed on the proper patient, proper surgical site and proper surgery to be performed.    An Esmarch was used to exsanguinate the right upper extremity and the tourniquet was insufflated to 250 mmHg. A longitudinal incision was made directly over the A1 pulley of the right thumb. Sharp and blunt dissection was taken down to the level of the A1 pulley. Soft tissue retractors were placed and the A1 pulley was visualized. A Niobrara blade was used to incise the A1 pulley. Tenotomy scissors were then used to complete the A1 pulley release both proximally and distally. The patient was then asked to flex and  extend the fingers and make a full fist. There was no more triggering. The wound was then thoroughly irrigated with copious amounts of normal saline. The wound was then closed with 4-0 nylon in a horizontal mattress fashion. The wound was dressed with Xeroform, 4 x 4's, sterile cast padding and a loosely approximated Coban dressing. The tourniquet was deflated. The patient was taken to the PACU in good and stable condition.    Disposition: The patient will be discharged home on a regular diet, weightbearing as tolerated with the exception of a 10 pound lifting restriction. Dressings may be removed after 48 hours at which time the patient may begin showering and washing hands as normal. They should not soak the wound. The patient was prescribed tramadol 50 mg 1 tab by mouth every 4 hours when necessary pain. They were instructed not to drive or operate machinery while taking this medication.  They will return to clinic in 10-14 days.

## 2018-10-25 ENCOUNTER — APPOINTMENT (OUTPATIENT)
Dept: ADMISSIONS | Facility: MEDICAL CENTER | Age: 53
End: 2018-10-25
Attending: ORTHOPAEDIC SURGERY
Payer: COMMERCIAL

## 2018-10-26 ENCOUNTER — HOSPITAL ENCOUNTER (EMERGENCY)
Facility: MEDICAL CENTER | Age: 53
End: 2018-10-26
Attending: EMERGENCY MEDICINE
Payer: COMMERCIAL

## 2018-10-26 VITALS
HEART RATE: 90 BPM | TEMPERATURE: 97.6 F | DIASTOLIC BLOOD PRESSURE: 76 MMHG | BODY MASS INDEX: 41.95 KG/M2 | RESPIRATION RATE: 20 BRPM | SYSTOLIC BLOOD PRESSURE: 146 MMHG | OXYGEN SATURATION: 94 % | HEIGHT: 70 IN | WEIGHT: 293 LBS

## 2018-10-26 DIAGNOSIS — T81.41XA INFECTION OF SUPERFICIAL INCISIONAL SURGICAL SITE AFTER PROCEDURE, INITIAL ENCOUNTER: ICD-10-CM

## 2018-10-26 DIAGNOSIS — G89.18 POST-OP PAIN: ICD-10-CM

## 2018-10-26 PROCEDURE — 99284 EMERGENCY DEPT VISIT MOD MDM: CPT

## 2018-10-26 RX ORDER — HYDROCODONE BITARTRATE AND ACETAMINOPHEN 5; 325 MG/1; MG/1
1-2 TABLET ORAL EVERY 6 HOURS PRN
Qty: 15 TAB | Refills: 0 | Status: SHIPPED | OUTPATIENT
Start: 2018-10-26 | End: 2018-10-29

## 2018-10-26 RX ORDER — CEPHALEXIN 500 MG/1
500 CAPSULE ORAL 4 TIMES DAILY
Qty: 40 CAP | Refills: 0 | Status: SHIPPED | OUTPATIENT
Start: 2018-10-26 | End: 2018-11-05

## 2018-10-26 RX ORDER — SULFAMETHOXAZOLE AND TRIMETHOPRIM 200; 40 MG/5ML; MG/5ML
10 SUSPENSION ORAL 2 TIMES DAILY
Qty: 100 ML | Refills: 0 | Status: SHIPPED | OUTPATIENT
Start: 2018-10-26 | End: 2018-11-05

## 2018-10-26 ASSESSMENT — PAIN SCALES - GENERAL: PAINLEVEL_OUTOF10: 4

## 2018-10-26 NOTE — ED TRIAGE NOTES
Presents with a hx of ganglion cyst removal from her right thumb the 17 of this month.  Pt C/O swelling,and increased throbbing pain since this past Monday.

## 2018-10-27 NOTE — ED NOTES
Dc instructions and prescriptions reviewed with pt. To f/u with ortho o Monday. Aware of no driving while taking pain meds

## 2018-11-05 ENCOUNTER — OFFICE VISIT (OUTPATIENT)
Dept: HEALTH INFORMATION MANAGEMENT | Facility: MEDICAL CENTER | Age: 53
End: 2018-11-05
Payer: COMMERCIAL

## 2018-11-05 VITALS
BODY MASS INDEX: 41.95 KG/M2 | HEART RATE: 72 BPM | DIASTOLIC BLOOD PRESSURE: 78 MMHG | WEIGHT: 293 LBS | SYSTOLIC BLOOD PRESSURE: 132 MMHG | HEIGHT: 70 IN

## 2018-11-05 DIAGNOSIS — E11.69 TYPE 2 DIABETES MELLITUS WITH OTHER SPECIFIED COMPLICATION, WITHOUT LONG-TERM CURRENT USE OF INSULIN (HCC): ICD-10-CM

## 2018-11-05 DIAGNOSIS — F33.41 RECURRENT MAJOR DEPRESSIVE DISORDER, IN PARTIAL REMISSION (HCC): ICD-10-CM

## 2018-11-05 DIAGNOSIS — E66.01 MORBID (SEVERE) OBESITY DUE TO EXCESS CALORIES (HCC): ICD-10-CM

## 2018-11-05 DIAGNOSIS — R63.5 WEIGHT GAIN: ICD-10-CM

## 2018-11-05 DIAGNOSIS — R63.2 BINGE EATING: ICD-10-CM

## 2018-11-05 DIAGNOSIS — G47.33 OBSTRUCTIVE SLEEP APNEA SYNDROME: ICD-10-CM

## 2018-11-05 PROCEDURE — 97803 MED NUTRITION INDIV SUBSEQ: CPT | Performed by: DIETITIAN, REGISTERED

## 2018-11-05 PROCEDURE — 99214 OFFICE O/P EST MOD 30 MIN: CPT | Performed by: INTERNAL MEDICINE

## 2018-11-05 RX ORDER — BUPROPION HYDROCHLORIDE 75 MG/1
150 TABLET ORAL 2 TIMES DAILY
Qty: 60 TAB | Refills: 1 | Status: SHIPPED | OUTPATIENT
Start: 2018-11-05 | End: 2018-12-11 | Stop reason: SDUPTHER

## 2018-11-05 NOTE — PROGRESS NOTES
Bariatric Medicine Follow Up  No chief complaint on file.      History of Present Illness:   Latia Almanza is a 53 y.o. female who presents for weight management follow-up and to help address co-morbidities related to overweight, including T2DM.    During the patient's last visit, the following were discussed and recommended:  Weight Goal: 5% wt loss at one month after start (pt goal weight is 30-40 pound weight loss within the next 6 months)  Diet:   High Protein/Low Carb Meals and 2 snacks between meals daily  Start one Robard meal replacement shake for breakfast daily to avoid meal skipping  >100 g protein, <100 g total carbs daily, did not set these goals today  64+ oz water per day  Avoid sweet drinks and sodas; reduce beer intake by 50%  Track daily intake with my fitness pal  Physical Activity: Start peloton bike at home at least once weekly  Risk level for moderate/vigorous exercise program:   Moderate given deconditioning  New Rx:   Wellbutrin, titrate up as needed at next visit  Behavior change:   Tracking, behavioral health counseling  Follow-up: One month  2 weeks with RD    Having Muscle Milk for breakfast.  Still having oatmeal some am.  Eating out a lot less.  Stopped drinking also.  Buying food from the perimeter of the store, having lettuce wrap sandwiches.    Tracking with phone but did not bring.  Cannot recall what her intake has been.    Waiting to hear from psych for eval.    Had infected hand, also in ER for asthma.  Got one dose of steroids, also on Abx.  Feels wellbutrin helping but would like to try higher dose.  Felt overwhelmed, no SI.  Knees painful, could not walk.  Now better.  Not on glucose lowering agent or tracking blood glucose regularly.    Exercise:   none     Review of Systems   Significant knee pain, swelling recently.  Denies change in BMs.  Sleep stable.  All other ROS were reviewed and are otherwise unchanged from my previous visit with patient.    Physical  "Exam:    /78   Pulse 72   Ht 1.778 m (5' 10\")   Wt (!) 141.6 kg (312 lb 3.2 oz)   BMI 44.80 kg/m²   Waist: 50.5 in  Body fat % 54  REE 1998 kcal/day    Weight change since last visit: -6 lb   Waist Circum change since last visit: -0.5 in     Constitutional: Oriented to person, place, and time and well-developed, well-nourished, and in no distress.    Head: Normocephalic.   Musculoskeletal: Normal range of motion. No edema.   Neurological: Alert and oriented to person, place, and time. No muscle weakness.  Gait normal.   Skin: Warm and dry. Not diaphoretic.   Psychiatric: Mood, memory, affect and judgment normal.     Laboratory:   Recent labs reviewed.  Fasting glucose 128      Dietitian Assessment: I have reviewed the Dietitian's assessment related to this encounter.       ASSESSMENT/PLAN:  Body mass index is 44.8 kg/m².    Obesity Stage ([place]): 2; Class 3    1. Weight gain  buPROPion (WELLBUTRIN) 75 MG Tab   2. Recurrent major depressive disorder, in partial remission (HCC)  buPROPion (WELLBUTRIN) 75 MG Tab   3. Binge eating     4. Type 2 diabetes mellitus with other specified complication, without long-term current use of insulin (HCC)     5. Obstructive sleep apnea syndrome       The patient has begun to make progress, is reversing her weight gain.  Binge eating has improved, but would benefit from behavioral health counseling.  Increase Wellbutrin dosing given ongoing depression, to see psychiatry.  Fasting glucose still elevated, needs to reduce CHO intake more.  Sleep stable.    The patient and I have discussed at length and agree to the following recommendations, which are all addressing the above diagnoses:    Weight Goal: 3-5% wt loss each month (pt goal weight is 270 lb)  Diet: High Protein/Low Carb Meals and 2 snacks between meals daily  Muscle milk for breakfast daily, some mornings has oatmeal  >100 g protein, <100 g total carbs daily, 8045-1399 kcal per day is goal  64+ oz water per " day  Avoid sweet drinks and sodas  Track daily intake with my fitness pal, bring in next visit!  Physical Activity: None given knee pain.  Consider PT.  New Rx: None.  Consider metformin at next visit.  Increase Wellbutrin dosing.  Behavior change: Await behavioral health counseling.  Keep tracking.  Avoid meal skipping.  Follow-up: One month    Patient's body mass index is 44.8 kg/m². Exercise and nutrition counseling were performed at this visit.

## 2018-11-05 NOTE — PROGRESS NOTES
"Nutrition Reassess: Medical Weight Management   Today's date: 11/5/2018  Referring Provider: Terry Currie MD    Time: in/out 10:23-10:44am   Visit#: 2     Subjective:  -Latia states she has been using Muscle Milk for breakfast most days  -She has been using My fitness pal and has found it helpful , however has not been as good about using it the past 1-2 weeks.   -Forgot her phone today so we did not review My Fitness Pal   -Has cut out soda  -Is eating out much less often and if she does is choosing 1/2 size portions  -Is still working on her eating routine but finds it helpful to eat more often to prevent binge eating in evenings         Diet history:   Breakfast - Muscle Milk or occasionally oatmeal   Lunch - didn't discuss today   Snack - EPIC bar, cottage cheese or nuts or hard boiled egg  Dinner - varies   Snack- veggies, or pickle     Anthropometrics/Objective  Today's weight:    Vitals:    11/05/18 1001   BP: 132/78   Weight: (!) 141.6 kg (312 lb 3.2 oz)   Height: 1.778 m (5' 10\")     BMI:  Body mass index is 44.8 kg/m².  Starting weight/date 318.1lb     Total weight change : -5.9lb            Meal Plan:   1800kcal high protein low carb diet with 1 meal replacements per day   Muscle milk for breakfast   1-2 snacks as needed (1oz protein + ns veggies)     ReAssesment/Notes:  Today we reviewed snack guidelines and ideas. Pt is working on her eating pattern to contain more frequent meals and snacks. Tracking in food journal greg is helping with this and she finds the Greg is teaching her a lot of about the nutrient value of her foods. Today we also discussed exercise.   Next appt would go over food journal in more detail and discuss nutrition basics to have pt begin to think about quality in addition to quantity in regards to food.     Goals:   1. Continue to track on My Fitness Pal - 1800kcal (30% protein, 40-45% fat, 25-30% carbs)   2. Begin exercise --- walk 3:30pm (set an alarm) x 30 mins   3. Meet with " pulmonologist to discuss sleep apnea and CPAP machine   4. Healthy snacks as needed 1-2 per day   5. Muscle milk or high protein breakfast     Follow-up: 6 weeks with ARYAN and MD

## 2018-11-07 NOTE — ED PROVIDER NOTES
ED Provider Note    CHIEF COMPLAINT  Chief Complaint   Patient presents with   • Post-Op Complications      HPI  Latia Almanza is a 53 y.o. female who presents To the ED with complete complaints of increasing swelling and redness to the area of her surgery this is a redictation.  Patient states the area started turning slightly red and she has been having increasing pain.  She presents to ED for evaluation.      REVIEW OF SYSTEMS  See HPI for further details. All other systems are negative.     PAST MEDICAL HISTORY  Past Medical History:   Diagnosis Date   • Allergic rhinitis    • Anxiety    • Asthma     daily and as needed inhalers   • Back pain     back/ right hip   • Bronchitis 2017   • Dental disorder     upper implants   • Fatty liver     per pt hx   • Heart burn    • Heart murmur     functional murmur   • Obesity    • Pneumonia 2017   • Restless leg syndrome    • Sarcoidosis    • Sciatica    • Sleep apnea     CPAP   • Snoring        FAMILY HISTORY  Family History   Problem Relation Age of Onset   • Lung Disease Mother         COPD, asthma   • Diabetes Mother    • Diabetes Father    • Heart Disease Father      Patient's family history has been discussed and is been found to be noncontributory to his present illness  SOCIAL HISTORY  Social History     Social History   • Marital status: Single     Spouse name: N/A   • Number of children: N/A   • Years of education: N/A     Social History Main Topics   • Smoking status: Former Smoker     Packs/day: 0.25     Years: 5.00     Types: Cigarettes     Quit date: 1/20/2001   • Smokeless tobacco: Never Used   • Alcohol use Yes      Comment: occasionally   • Drug use: No   • Sexual activity: Not on file     Other Topics Concern   • Not on file     Social History Narrative   • No narrative on file      e        SURGICAL HISTORY  Past Surgical History:   Procedure Laterality Date   • TRIGGER FINGER RELEASE Right 10/17/2018    Procedure: TRIGGER FINGER RELEASE - A1  "KATYA THUMB;  Surgeon: Houston Yung M.D.;  Location: SURGERY [facility];  Service: Orthopedics   • CYST EXCISION  10/17/2018    Procedure: CYST EXCISION;  Surgeon: Houston Yung M.D.;  Location: SURGERY [facility];  Service: Orthopedics   • GASTROSCOPY N/A 5/9/2018    Procedure: GASTROSCOPY;  Surgeon: Martin Castillo M.D.;  Location: SURGERY SAME DAY [facility];  Service: Gastroenterology   • OTHER  2017    dental implants   • LUMBAR FUSION ANTERIOR  10/27/2016    Procedure: LUMBAR FUSION ANTERIOR L5-S1 ALIF;  Surgeon: Ronal Washington III, M.D.;  Location: SURGERY [facility];  Service:    • HIP ARTHROPLASTY TOTAL Right 2008   • OTHER ORTHOPEDIC SURGERY Right 2008    R hip replacement March 2008   • ABDOMINAL HYSTERECTOMY TOTAL  2007   • SINUSCOPE  2001   • LAPAROTOMY  2000       CURRENT MEDICATIONS   Home Medications    **Home medications have not yet been reviewed for this encounter**         ALLERGIES   No Known Allergies    PHYSICAL EXAM  VITAL SIGNS: /76   Pulse 90   Temp 36.4 °C (97.6 °F)   Resp 20   Ht 1.778 m (5' 10\")   Wt (!) 145 kg (319 lb 10.7 oz)   SpO2 94%   BMI 45.87 kg/m²    Pulse Ox interpretation nonhypoxic    Constitutional: Well developed, Well nourished, No acute distress, Non-toxic appearance.     Skin: Warm, Dry, No erythema, the area of the surgical region is slightly erythematous and tender to palpation there is no abscess formation.  Neurologic: A sensation is intact  RADIOLOGY/PROCEDURES  No orders to display         COURSE & MEDICAL DECISION MAKING  Pertinent Labs & Imaging studies reviewed. (See chart for details)  Patient presents for evaluation.  Clinically could very well be an early cellulitis I am not overly impressed however I will start the patient on antibiotics.  I did briefly speak with Dr. Yung will follow up with the patient as an outpatient.    FINAL IMPRESSION  1. Post-op pain    2. Infection of superficial incisional " surgical site after procedure, initial encounter         The patient will return for new or worsening symptoms and is stable at the time of discharge.    The patient is referred to a primary physician for blood pressure management, diabetic screening, and for all other preventative health concerns.    I reviewed prescription monitoring program for patient's narcotic use before prescribing a scheduled drug.The patient will not drink alcohol nor drive with prescribed medications      In prescribing controlled substances to this patient, I certify that I have obtained and reviewed the medical history this patient I have also made a good izabel effort to obtain applicable records from other providers who have treated the patient and records did not demonstrate any increased risk of substance abuse that would prevent me from prescribing controlled substances.     I have conducted a physical exam and documented it. I have reviewed Ms. Almanza’s prescription history as maintained by the [place] Prescription Monitoring Program.     I have assessed the patient’s risk for abuse, dependency, and addiction using the validated Opioid Risk Tool available at https://www.mdcalc.com/bkbhct-ljcx-ujjb-ort-narcotic-abuse.     Given the above, I believe the benefits of controlled substance therapy outweigh the risks. The reasons for prescribing controlled substances include in my professional opinion, controlled substances are a reasonable choice for this patient. Accordingly, I have discussed the risk and benefits, treatment plan, and alternative therapies with the patient. The patient has been consented for the medication and understands the risks.        DISPOSITION:  Patient will be discharged home in stable condition.    FOLLOW UP:  Houston Yung M.D.  [address]  [address]  [facility] [address]  [phone]    Schedule an appointment as soon as possible for a visit   On Monday for recheck return if any symptoms  worsen      OUTPATIENT MEDICATIONS:  Discharge Medication List as of 10/26/2018  5:13 PM      START taking these medications    Details   HYDROcodone-acetaminophen (NORCO) 5-325 MG Tab per tablet Take 1-2 Tabs by mouth every 6 hours as needed for up to 3 days., Disp-15 Tab, R-0, Print Rx Paper, For 3 days      sulfamethoxazole-trimethoprim 200-40 mg/5 mL (BACTRIM,SEPTRA) 200-40 MG/5ML Suspension Take 10 mL by mouth 2 times a day for 10 days., Disp-100 mL, R-0, Print Rx Paper      cephALEXin (KEFLEX) 500 MG Cap Take 1 Cap by mouth 4 times a day for 10 days., Disp-40 Cap, R-0, Print Rx Paper                     Electronically signed by: Juliano Gallardo, 11/7/2018 12:13 PM

## 2018-12-10 ENCOUNTER — OFFICE VISIT (OUTPATIENT)
Dept: MEDICAL GROUP | Facility: MEDICAL CENTER | Age: 53
End: 2018-12-10
Payer: COMMERCIAL

## 2018-12-10 VITALS
BODY MASS INDEX: 41.95 KG/M2 | HEIGHT: 70 IN | DIASTOLIC BLOOD PRESSURE: 74 MMHG | SYSTOLIC BLOOD PRESSURE: 130 MMHG | WEIGHT: 293 LBS | TEMPERATURE: 98.8 F | HEART RATE: 69 BPM | OXYGEN SATURATION: 97 %

## 2018-12-10 DIAGNOSIS — D72.819 LEUKOPENIA, UNSPECIFIED TYPE: ICD-10-CM

## 2018-12-10 DIAGNOSIS — H81.10 BENIGN PAROXYSMAL POSITIONAL VERTIGO, UNSPECIFIED LATERALITY: ICD-10-CM

## 2018-12-10 DIAGNOSIS — Z12.31 ENCOUNTER FOR SCREENING MAMMOGRAM FOR MALIGNANT NEOPLASM OF BREAST: ICD-10-CM

## 2018-12-10 DIAGNOSIS — J45.20 MILD INTERMITTENT ASTHMA WITHOUT COMPLICATION: ICD-10-CM

## 2018-12-10 DIAGNOSIS — D86.9 SARCOIDOSIS: ICD-10-CM

## 2018-12-10 DIAGNOSIS — R73.01 IFG (IMPAIRED FASTING GLUCOSE): ICD-10-CM

## 2018-12-10 DIAGNOSIS — J30.2 SEASONAL ALLERGIES: ICD-10-CM

## 2018-12-10 PROBLEM — R63.5 WEIGHT GAIN: Status: RESOLVED | Noted: 2018-09-25 | Resolved: 2018-12-10

## 2018-12-10 PROBLEM — J18.0 BRONCHOPNEUMONIA, UNSPECIFIED ORGANISM: Status: RESOLVED | Noted: 2017-11-24 | Resolved: 2018-12-10

## 2018-12-10 PROBLEM — J96.01 ACUTE RESPIRATORY FAILURE WITH HYPOXIA (HCC): Status: RESOLVED | Noted: 2017-11-24 | Resolved: 2018-12-10

## 2018-12-10 PROCEDURE — 99214 OFFICE O/P EST MOD 30 MIN: CPT | Performed by: INTERNAL MEDICINE

## 2018-12-11 ENCOUNTER — OFFICE VISIT (OUTPATIENT)
Dept: HEALTH INFORMATION MANAGEMENT | Facility: MEDICAL CENTER | Age: 53
End: 2018-12-11
Payer: COMMERCIAL

## 2018-12-11 VITALS
OXYGEN SATURATION: 96 % | BODY MASS INDEX: 41.95 KG/M2 | DIASTOLIC BLOOD PRESSURE: 78 MMHG | HEIGHT: 70 IN | WEIGHT: 293 LBS | SYSTOLIC BLOOD PRESSURE: 124 MMHG | HEART RATE: 83 BPM

## 2018-12-11 DIAGNOSIS — F33.41 RECURRENT MAJOR DEPRESSIVE DISORDER, IN PARTIAL REMISSION (HCC): ICD-10-CM

## 2018-12-11 DIAGNOSIS — E11.69 TYPE 2 DIABETES MELLITUS WITH OTHER SPECIFIED COMPLICATION, WITHOUT LONG-TERM CURRENT USE OF INSULIN (HCC): ICD-10-CM

## 2018-12-11 DIAGNOSIS — R63.2 BINGE EATING: ICD-10-CM

## 2018-12-11 DIAGNOSIS — R63.5 WEIGHT GAIN: ICD-10-CM

## 2018-12-11 PROCEDURE — 99214 OFFICE O/P EST MOD 30 MIN: CPT | Performed by: INTERNAL MEDICINE

## 2018-12-11 PROCEDURE — 97803 MED NUTRITION INDIV SUBSEQ: CPT | Performed by: DIETITIAN, REGISTERED

## 2018-12-11 RX ORDER — BUPROPION HYDROCHLORIDE 75 MG/1
TABLET ORAL
Qty: 60 TAB | Refills: 0 | Status: SHIPPED | OUTPATIENT
Start: 2018-12-11 | End: 2018-12-28 | Stop reason: SDUPTHER

## 2018-12-11 NOTE — PROGRESS NOTES
"Nutrition Reassess: Medical Weight Management   Today's date: 12/11/2018  Referring Provider: Terry Currie MD    Time: in/out 9:25-9:43  Visit#: 3    Subjective:   - Latia didn't bring her food journal. She states she is around 3207-5618 calories per day though and has a hard time making it to 1500.    - Didn't like Muscle Milk, didn't think it was \"clean\" enough and read that casein rather than whey helped with fullness longer. She switched to a powder she found at [facility] and mixing with almond milk. Doesn't remember name   - Having her shake with 1/2 cup plain steel cut oats since she found the shake alone did not hold her appetite and if she has it with the oats she is full until lunch   - Will be starting Metformin and checking [facility] for Dr. Lam    - Walked 3-4 times since last appointment and want to make PA more routine. Also wants to start doing yoga again   - Wants to come in more regularly for more accountability    Diet history:   Breakfast - Protein shake and 1 cup oatmeal  Snack - none  Lunch - Evolve frozen meal w/ ~ 300 kcal and ~30g CHO  Snack - pickles, cottage cheese, yogurt  Dinner - she can't remember    Anthropometrics/Objective  Today's weight:    Vitals:    12/11/18 0855   BP: 124/78   Weight: (!) 143.3 kg (316 lb)   Height: 1.778 m (5' 10\")     BMI:  Body mass index is 45.34 kg/m².  Starting weight/date 317.5 lb (9/25/18)     Total weight change : +1.5 lb            Meal Plan:   Low CHO / high protein / calorie controlled with 1 meal replacements per day     ReAssesment/Notes:  Helped Latia set SMART goals for PA. She will get intentional PA on Tuesdays, Thursdays and Saturdays. Yoga is Tuesday and Thursday and walking will be Saturday. Answered her questions on protein shakes and types and amount of protein. Gave her the handout with the requirements for her to use to check the one she has at home. Discussed oatmeal and portion of 1/4 cup dry (1/2 cup cooked) and swapping this out with " a serving of fruit some mornings. Reminded her to always include a protein at her afternoon snack.     Follow-up: 2 weeks w/ RD

## 2018-12-11 NOTE — PROGRESS NOTES
CHIEF COMPLAINT  Chief Complaint   Patient presents with   • Dizziness     x 1 week   • Vertigo     HPI  Patient is a 53 y.o. female patient who presents today for the following    Dizziness  The patient developed intermittent dizziness approximately 1 week ago.   It is mostly when she is turning her head, turning in bed.   Accompanied with intermittent nausea.    Denies:  Trauma  Recent or current respiratory infection  Hearing problem  Balance problem  Tinnitus.    IFG  The patient had elevated FBG, and A1c.  No polydipsia, polyphagia, polyuria.  No abdominal pain, weight loss, fatigue.  Diet: low maria e  Exercise: increased  FH of DM: parents    OBESITY, BMI 45, was 47  She was evaluated for bariatric surgery   -Referred to Dr Tinoco  BMI went from 47 to 45    Onset: after hysterectomy, at the age of 46 y/o  Diet: regular  Exercise: limited  No temperature intolerance. No change in hair/skin quality, BMs.   No HTN, buffalo hump, purple striae, flushing.  FH of obesity: mother    Asthma / Sarcoidosis  Seasonal allergies  Leukopenia  52 year old, female with history of seasonal allergies  Sarcoidosis dg: in 2004.      C/o dyspnea, probably multifactorial, due reactive airway disease, morbid obesity, sarcoidosis.  The last PFTs were done on 1/24/18:  Normal oxygen transfer, total lung capacity at 96% of predicted, mid flows are normal. Mild reduction of forced vital capacity is noted. Marked reduction of expiratory reserve volume reflects elevated body weight.     She has been f/u by pulmonology.   - Labs showed leukopenia.  FH: mother (COPD, asthma)    Reviewed PMH, PSH, FH, SH, ALL, HCM/IMM, no changes  Reviewed MEDS, no changes    Patient Active Problem List    Diagnosis Date Noted   • Transaminitis 01/29/2018     Priority: Medium   • Hypokalemia 01/29/2018     Priority: Medium   • Sleep apnea 04/20/2015     Priority: Medium   • Trigger finger of right thumb 10/17/2018   • Binge eating 09/25/2018   • Weight  gain 09/25/2018   • Intermittent asthma without complication 03/26/2018   • Chronic seasonal allergic rhinitis due to pollen 03/26/2018   • Leukopenia 01/29/2018   • Influenza 01/29/2018   • Sarcoidosis 01/24/2018   • BMI 45.0-49.9, adult (CMS-HCC)- 45.7 11/24/2017   • DM (diabetes mellitus) ([facility]) 11/24/2017   • Pre-operative cardiovascular examination 11/17/2017   • Recurrent major depressive disorder, in partial remission ([facility]) 03/03/2017   • Degeneration of lumbar intervertebral disc 10/27/2016   • Wheezing 01/20/2016   • Postmenopausal HRT (hormone replacement therapy) 01/20/2016   • H/O hysterectomy with oophorectomy 04/20/2015   • S/P hip replacement 04/20/2015     CURRENT MEDICATIONS  Current Outpatient Prescriptions   Medication Sig Dispense Refill   • buPROPion (WELLBUTRIN) 75 MG Tab TAKE ONE TABLET BY MOUTH TWICE A DAY 60 Tab 0   • fluticasone-salmeterol (ADVAIR DISKUS) 250-50 MCG/DOSE AEROSOL POWDER, BREATH ACTIVATED Advair Diskus 250 mcg-50 mcg/dose powder for inhalation     • liraglutide (VICTOZA) 18 MG/3ML Solution Pen-injector injection 1.2 mg by Subdermal route.     • SPIRIVA RESPIMAT 1.25 MCG/ACT Aero Soln      • buPROPion (WELLBUTRIN) 75 MG Tab Take 2 Tabs by mouth 2 times a day. 60 Tab 1   • escitalopram (LEXAPRO) 20 MG tablet TAKE ONE TABLET BY MOUTH DAILY 30 Tab 0   • estradiol (VIVELLE DOT) 0.05 MG/24HR PATCH BIWEEKLY APPLY  ONE PATCH TO CLEAN DRY SKIN AS DIRECTED 2 TIMES A WEEK, REMOVE OLD PATCH BEFORE APPLYING NEW ONE 8 Patch 2   • omeprazole (PRILOSEC) 20 MG delayed-release capsule Take 1 Cap by mouth 2 times a day. 60 Cap 11     No current facility-administered medications for this visit.      ALLERGIES  Allergies: Patient has no known allergies.  PAST MEDICAL HISTORY  Past Medical History:   Diagnosis Date   • Bronchitis 2017   • Pneumonia 2017   • Allergic rhinitis    • Anxiety    • Asthma     daily and as needed inhalers   • Back pain     back/ right hip   • Dental disorder     upper  "implants   • Fatty liver     per pt hx   • Heart burn    • Heart murmur     functional murmur   • Obesity    • Restless leg syndrome    • Sarcoidosis    • Sciatica    • Sleep apnea     CPAP   • Snoring      SURGICAL HISTORY  She  has a past surgical history that includes abdominal hysterectomy total (); hip arthroplasty total (Right, ); laparotomy (); sinuscope (); other (2017); other orthopedic surgery (Right, ); lumbar fusion anterior (10/27/2016); gastroscopy (N/A, 2018); trigger finger release (Right, 10/17/2018); and cyst excision (10/17/2018).  SOCIAL HISTORY  Social History   Substance Use Topics   • Smoking status: Former Smoker     Packs/day: 0.25     Years: 5.00     Types: Cigarettes     Quit date: 2001   • Smokeless tobacco: Never Used   • Alcohol use Yes      Comment: occasionally     Social History     Social History Narrative   • No narrative on file     FAMILY HISTORY  Family History   Problem Relation Age of Onset   • Lung Disease Mother         COPD, asthma   • Diabetes Mother    • Diabetes Father    • Heart Disease Father      Family Status   Relation Status   • Mo    • Fa Alive     ROS   Constitutional: Negative for fever, chills.  HENT: Negative for congestion, sore throat. And per HPI.  Eyes: Negative for blurred vision.   Respiratory: As above.  Cardiovascular: Negative for chest pain, palpitations.   Gastrointestinal: Negative for heartburn, nausea, abdominal pain.   Genitourinary: Negative for dysuria.  Musculoskeletal: Negative for significant myalgias, back pain and joint pain.   Skin: Negative for rash and itching.   Neuro: Negative for dizziness, weakness and headaches.   Endo/Heme/Allergies: Does not bruise/bleed easily. And per HPI.  Psychiatric/Behavioral: treated for anxiety/depression.    PHYSICAL EXAM   Blood pressure 130/74, pulse 69, temperature 37.1 °C (98.8 °F), temperature source Temporal, height 1.778 m (5' 10\"), weight (!) 145.2 kg (320 lb " 1.7 oz), SpO2 97 %, not currently breastfeeding. Body mass index is 45.93 kg/m².  General:  NAD, well appearing  HEENT:   NC/AT, PERRLA, EOMI, TMs are clear. Oropharyngeal mucosa is pink,  without lesions;  no cervical / supraclavicular  lymphadenopathy, no thyromegaly.    Cardiovascular: RRR.   No m/r/g. No carotid bruits .      Lungs:   CTAB, no w/r/r, no respiratory distress.  Abdomen: Soft, NT/ND + BS; unable to palpate liver/spleen due to obesity..  Extremities:  2+ DP and radial pulses bilaterally.  No c/c/e.   Skin:  Warm, dry.  No erythema. No rash.   Neurologic: Alert & oriented x 3. No focal deficits.  Psychiatric:  Affect normal, mood normal, judgment normal.    LABS     Labs are reviewed and discussed with a patient  Lab Results   Component Value Date/Time    CHOLSTRLTOT 185 05/08/2018 02:19 PM     (H) 05/08/2018 02:19 PM    HDL 68 05/08/2018 02:19 PM    TRIGLYCERIDE 70 05/08/2018 02:19 PM       Lab Results   Component Value Date/Time    SODIUM 137 10/15/2018 12:23 AM    POTASSIUM 3.6 10/15/2018 12:23 AM    CHLORIDE 108 10/15/2018 12:23 AM    CO2 20 10/15/2018 12:23 AM    GLUCOSE 128 (H) 10/15/2018 12:23 AM    BUN 14 10/15/2018 12:23 AM    CREATININE 1.18 10/15/2018 12:23 AM     Lab Results   Component Value Date/Time    ALKPHOSPHAT 85 10/15/2018 12:23 AM    ASTSGOT 27 10/15/2018 12:23 AM    ALTSGPT 36 10/15/2018 12:23 AM    TBILIRUBIN 0.2 10/15/2018 12:23 AM      Lab Results   Component Value Date/Time    HBA1C 6.2 (H) 05/08/2018 02:19 PM    HBA1C 6.3 (H) 11/24/2017 11:05 AM     Lab Results   Component Value Date/Time    WBC 6.9 10/15/2018 12:23 AM    RBC 4.37 10/15/2018 12:23 AM    HEMOGLOBIN 12.7 10/15/2018 12:23 AM    HEMATOCRIT 38.6 10/15/2018 12:23 AM    MCV 88.3 10/15/2018 12:23 AM    MCH 29.1 10/15/2018 12:23 AM    MCHC 32.9 (L) 10/15/2018 12:23 AM    MPV 9.1 10/15/2018 12:23 AM    NEUTSPOLYS 52.40 10/15/2018 12:23 AM    LYMPHOCYTES 37.90 10/15/2018 12:23 AM    MONOCYTES 7.50 10/15/2018  12:23 AM    EOSINOPHILS 1.70 10/15/2018 12:23 AM    BASOPHILS 0.40 10/15/2018 12:23 AM      IMAGING     None    ASSESMENT AND PLAN     1. Benign paroxysmal positional vertigo, unspecified laterality  Explained natural course  - REFERRAL TO PHYSICAL THERAPY Reason for Therapy: Eval/Treat/Report    2. IFG (impaired fasting glucose)  Discussed about risk to develop DM.   Advised low carb diet, exercise, watch for WT.   - COMP METABOLIC PANEL; Future  - HEMOGLOBIN A1C; Future  - MICROALBUMIN CREAT RATIO URINE; Future    3. BMI 45.0-49.9, adult (HCC)  Continue current regimen    4. Encounter for screening mammogram for malignant neoplasm of breast  - MA-SCREEN MAMMO W/CAD-BILAT; Standing    5. Mild intermittent asthma without complication  6. Sarcoidosis  - CBC WITH DIFFERENTIAL; Future  - WESTERGREN SED RATE; Future  7. Seasonal allergies  Stable, follow-up labs; continue current treatment    8. Leukopenia, unspecified type  Mild, follow-up labs  - CBC WITH DIFFERENTIAL; Future    Counseling:   - Smoking:  Nonsmoker    Followup: in 4 weeks with labs    All questions are answered.    Please note that this dictation was created using voice recognition software, and that there might be errors of earl and possibly content.

## 2018-12-11 NOTE — PROGRESS NOTES
Bariatric Medicine Follow Up  Chief Complaint   Patient presents with   • Weight Gain       History of Present Illness:   Latia Almanza is a 53 y.o. female who presents for weight management follow-up and to help address co-morbidities related to overweight, including T2DM, JOSE RAMON.    During the patient's last visit, the following were discussed and recommended:  Weight Goal: 3-5% wt loss each month (pt goal weight is 270 lb)  Diet: High Protein/Low Carb Meals and 2 snacks between meals daily  Muscle milk for breakfast daily, some mornings has oatmeal  >100 g protein, <100 g total carbs daily, 9808-8895 kcal per day is goal  64+ oz water per day  Avoid sweet drinks and sodas  Track daily intake with my fitness pal, bring in next visit!  Physical Activity: None given ankle pain.  Consider PT.  New Rx: None.  Consider metformin at next visit.  Increase Wellbutrin dosing.  Behavior change: Await behavioral health counseling.  Keep tracking.  Avoid meal skipping.  Follow-up: One month    MyFitnessPal helping show when to eat more protein.  Had been down to 1200 kcal per day.  Was not feeling hungry.  Ate out more around Thanksgiving, which she feels is her big problem.  Still skipping some meals.  Mornings are hardest.  Has whey protein drink late morning and evening.    Not checking fasting glucose.  Increased Wellbutrin, but then felt worse when went back down to normal dose.  Thinks it is really positional vertigo. Confirms Wellbutrin 75 mg bid.  Wants psychiatry out of [facility] b/c cannot get in to be seen.  Recent reduced GFR.  Sleep stable.    Exercise:   Ankle pain but now walking in [place], not yet routine     Review of Systems   Intermittent vertigo.  Depression stable.  Denies lightheadedness, worsening fatigue or change in BMs.  All other ROS were reviewed and are otherwise unchanged from my previous visit with patient.    Physical Exam:    /78 (BP Location: Left arm, Patient  "Position: Sitting, BP Cuff Size: Large adult)   Pulse 83   Ht 1.778 m (5' 10\")   Wt (!) 143.3 kg (316 lb)   SpO2 96%   BMI 45.34 kg/m²   Waist: 49.75 in  Body fat % 51  REE 2011 kcal/day    Weight change since last visit: +4 lb (-2 lb total)  Waist Circum change since last visit: -1 in (-1 in total)    Constitutional: Oriented to person, place, and time and well-developed, well-nourished, and in no distress.    Head: Normocephalic.   Musculoskeletal: Normal range of motion. No edema.   Neurological: Alert and oriented to person, place, and time. No muscle weakness.  Gait normal.   Skin: Warm and dry. Not diaphoretic.   Psychiatric: Mood, memory, affect and judgment normal.     Laboratory:   None new.      Dietitian Assessment: I have reviewed the Dietitian's assessment related to this encounter.       ASSESSMENT/PLAN:  Body mass index is 45.34 kg/m².    Obesity Stage ([place]): 2; Class 3    1. BMI 45.0-49.9, adult (CMS-[facility])- 45.7  metFORMIN (GLUCOPHAGE) 500 MG Tab   2. Type 2 diabetes mellitus with other specified complication, without long-term current use of insulin ([facility])  metFORMIN (GLUCOPHAGE) 500 MG Tab   3. Binge eating     4. Recurrent major depressive disorder, in partial remission ([facility])       The patient struggles with consistency.  Start tracking fasting glucose, continue tracking intake.  Increase protein throughout the day, which should help reduce binge eating.  Continue Wellbutrin, appears to be helping with depression.    The patient and I have discussed at length and agree to the following recommendations, which are all addressing the above diagnoses:    Weight Goal: 3-5% wt loss each month (pt goal weight is 270 lb)  Diet: High Protein/Low Carb Meals and 2 snacks between meals daily  Okay to use collagen/whey-based protein for snack twice daily  >100 g protein, <100 g total carbs daily, around 1500 kcal per day  64+ oz water per day  Avoid sweet drinks and sodas  Track daily intake with my " fitness pal, as she is doing  Physical Activity: Neighborhood walking, restart yoga  Risk level for moderate/vigorous exercise program: Moderate given poor exercise tolerance  New Rx: Start metformin 500 every morning, titrate up as tolerated  Side Effects: Possible diarrhea  Behavior change: Mindfulness, increase activity  Follow-up: 1 month  2 weeks with RD  Patient request more frequent visits    Patient's body mass index is 45.34 kg/m². Exercise and nutrition counseling were performed at this visit.

## 2018-12-12 ENCOUNTER — OFFICE VISIT (OUTPATIENT)
Dept: URGENT CARE | Facility: CLINIC | Age: 53
End: 2018-12-12
Payer: COMMERCIAL

## 2018-12-12 VITALS
RESPIRATION RATE: 20 BRPM | OXYGEN SATURATION: 98 % | SYSTOLIC BLOOD PRESSURE: 128 MMHG | HEART RATE: 88 BPM | DIASTOLIC BLOOD PRESSURE: 80 MMHG | TEMPERATURE: 99.9 F

## 2018-12-12 DIAGNOSIS — J45.21 MILD INTERMITTENT ASTHMA WITH EXACERBATION: ICD-10-CM

## 2018-12-12 PROCEDURE — 94640 AIRWAY INHALATION TREATMENT: CPT | Performed by: PHYSICIAN ASSISTANT

## 2018-12-12 PROCEDURE — 99214 OFFICE O/P EST MOD 30 MIN: CPT | Mod: 25 | Performed by: PHYSICIAN ASSISTANT

## 2018-12-12 RX ORDER — IPRATROPIUM BROMIDE AND ALBUTEROL SULFATE 2.5; .5 MG/3ML; MG/3ML
3 SOLUTION RESPIRATORY (INHALATION) ONCE
Status: COMPLETED | OUTPATIENT
Start: 2018-12-12 | End: 2018-12-12

## 2018-12-12 RX ORDER — DOXYCYCLINE HYCLATE 100 MG
100 TABLET ORAL 2 TIMES DAILY
Qty: 14 TAB | Refills: 0 | Status: SHIPPED | OUTPATIENT
Start: 2018-12-12 | End: 2018-12-19

## 2018-12-12 RX ORDER — BENZONATATE 200 MG/1
200 CAPSULE ORAL 3 TIMES DAILY PRN
Qty: 30 CAP | Refills: 0 | Status: SHIPPED | OUTPATIENT
Start: 2018-12-12 | End: 2019-02-13

## 2018-12-12 RX ORDER — PREDNISONE 20 MG/1
TABLET ORAL
Qty: 10 TAB | Refills: 0 | Status: SHIPPED | OUTPATIENT
Start: 2018-12-12 | End: 2019-02-13

## 2018-12-12 RX ADMIN — IPRATROPIUM BROMIDE AND ALBUTEROL SULFATE 3 ML: 2.5; .5 SOLUTION RESPIRATORY (INHALATION) at 18:31

## 2018-12-12 ASSESSMENT — ENCOUNTER SYMPTOMS
WHEEZING: 1
VOMITING: 0
ABDOMINAL PAIN: 0
SWEATS: 0
FEVER: 1
FOCAL WEAKNESS: 0
CHILLS: 1
DIARRHEA: 0
NAUSEA: 0
SPUTUM PRODUCTION: 0
SORE THROAT: 0
HEADACHES: 0
RHINORRHEA: 0
SINUS PAIN: 0
MYALGIAS: 0
SHORTNESS OF BREATH: 1
TINGLING: 0
SENSORY CHANGE: 0
COUGH: 1
PALPITATIONS: 0
HEMOPTYSIS: 0

## 2018-12-13 NOTE — PROGRESS NOTES
Subjective:      Latia Almanza is a 53 y.o. female who presents with Cough (Couple days dry cough , since last night SOB)            Cough   This is a new problem. Episode onset: 3 days  The problem has been gradually worsening. The cough is non-productive. Associated symptoms include chest pain (pain in right chest with coughing), chills, a fever, shortness of breath and wheezing. Pertinent negatives include no ear congestion, ear pain, headaches, hemoptysis, myalgias, nasal congestion, postnasal drip, rash, rhinorrhea, sore throat or sweats. She has tried a beta-agonist inhaler and ipratropium inhaler for the symptoms. The treatment provided no relief. Her past medical history is significant for asthma and bronchitis. There is no history of pneumonia.       Past Medical History:   Diagnosis Date   • Allergic rhinitis    • Anxiety    • Asthma     daily and as needed inhalers   • Back pain     back/ right hip   • Bronchitis 2017   • Dental disorder     upper implants   • Fatty liver     per pt hx   • Heart burn    • Heart murmur     functional murmur   • Obesity    • Pneumonia 2017   • Restless leg syndrome    • Sarcoidosis    • Sciatica    • Sleep apnea     CPAP   • Snoring        Past Surgical History:   Procedure Laterality Date   • TRIGGER FINGER RELEASE Right 10/17/2018    Procedure: TRIGGER FINGER RELEASE - A1 KATYA THUMB;  Surgeon: Houston Yung M.D.;  Location: SURGERY [facility];  Service: Orthopedics   • CYST EXCISION  10/17/2018    Procedure: CYST EXCISION;  Surgeon: Houston Yung M.D.;  Location: SURGERY [facility];  Service: Orthopedics   • GASTROSCOPY N/A 5/9/2018    Procedure: GASTROSCOPY;  Surgeon: Martin Castillo M.D.;  Location: SURGERY SAME DAY [facility];  Service: Gastroenterology   • OTHER  2017    dental implants   • LUMBAR FUSION ANTERIOR  10/27/2016    Procedure: LUMBAR FUSION ANTERIOR L5-S1 ALIF;  Surgeon: Ronal Washington III, M.D.;  Location: SURGERY  [facility];  Service:    • HIP ARTHROPLASTY TOTAL Right 2008   • OTHER ORTHOPEDIC SURGERY Right 2008    R hip replacement March 2008   • ABDOMINAL HYSTERECTOMY TOTAL  2007   • SINUSCOPE  2001   • LAPAROTOMY  2000       Family History   Problem Relation Age of Onset   • Lung Disease Mother         COPD, asthma   • Diabetes Mother    • Diabetes Father    • Heart Disease Father        No Known Allergies    Medications, Allergies, and current problem list reviewed today in Epic    Review of Systems   Constitutional: Positive for chills and fever. Negative for malaise/fatigue.   HENT: Negative for congestion, ear discharge, ear pain, postnasal drip, rhinorrhea, sinus pain and sore throat.    Respiratory: Positive for cough, shortness of breath and wheezing. Negative for hemoptysis and sputum production.    Cardiovascular: Positive for chest pain (pain in right chest with coughing). Negative for palpitations and leg swelling.   Gastrointestinal: Negative for abdominal pain, diarrhea, nausea and vomiting.   Musculoskeletal: Negative for myalgias.   Skin: Negative for rash.   Neurological: Negative for tingling, sensory change, focal weakness and headaches.     All other systems reviewed and are negative.        Objective:     /80 (BP Location: Left arm, Patient Position: Sitting, BP Cuff Size: Adult)   Pulse 88   Temp 37.7 °C (99.9 °F) (Temporal)   Resp 20   SpO2 98%      Physical Exam   Constitutional: She is oriented to person, place, and time. She appears well-developed and well-nourished. No distress.   HENT:   Head: Normocephalic and atraumatic.   Right Ear: Tympanic membrane, external ear and ear canal normal.   Left Ear: Tympanic membrane, external ear and ear canal normal.   Nose: Mucosal edema and rhinorrhea present.   Mouth/Throat: Uvula is midline, oropharynx is clear and moist and mucous membranes are normal.   Neck: Neck supple.   Cardiovascular: Normal rate, regular rhythm and normal heart  sounds.  Exam reveals no gallop and no friction rub.    No murmur heard.  Pulmonary/Chest: Effort normal. No respiratory distress. She has wheezes (mild diffuse wheezes ). She has no rales. She exhibits no tenderness.   Musculoskeletal: She exhibits no edema.   No calf TTP or swelling bilaterally.    Lymphadenopathy:     She has no cervical adenopathy.   Neurological: She is alert and oriented to person, place, and time. No cranial nerve deficit.   Skin: Skin is warm and dry. No rash noted.   Psychiatric: She has a normal mood and affect. Her behavior is normal. Judgment and thought content normal.               Assessment/Plan:     1. Mild intermittent asthma with exacerbation    - ipratropium-albuterol (DUONEB) nebulizer solution; 3 mL by Nebulization route Once.    Given in the office today. Patient's Lung sounds improved after treatment.     - doxycycline (VIBRAMYCIN) 100 MG Tab; Take 1 Tab by mouth 2 times a day for 7 days.  Dispense: 14 Tab; Refill: 0    - predniSONE (DELTASONE) 20 MG Tab; 2 tabs by mouth daily x 5 days.  Dispense: 10 Tab; Refill: 0    - benzonatate (TESSALON) 200 MG capsule; Take 1 Cap by mouth 3 times a day as needed.  Dispense: 30 Cap; Refill: 0       Differential diagnoses, Supportive care, and indications for immediate follow-up discussed with patient.   Instructed to return to clinic or nearest emergency department for any change in condition, further concerns, or worsening of symptoms.    The patient demonstrated a good understanding and agreed with the treatment plan.    Lori Patel P.A.-C.

## 2018-12-22 DIAGNOSIS — Z79.890 POSTMENOPAUSAL HRT (HORMONE REPLACEMENT THERAPY): ICD-10-CM

## 2018-12-24 RX ORDER — ESTRADIOL 0.05 MG/D
PATCH, EXTENDED RELEASE TRANSDERMAL
Qty: 8 PATCH | Refills: 3 | Status: SHIPPED | OUTPATIENT
Start: 2018-12-24 | End: 2019-08-08 | Stop reason: SDUPTHER

## 2018-12-28 DIAGNOSIS — R63.5 WEIGHT GAIN: ICD-10-CM

## 2018-12-28 DIAGNOSIS — F33.41 RECURRENT MAJOR DEPRESSIVE DISORDER, IN PARTIAL REMISSION (HCC): ICD-10-CM

## 2018-12-28 LAB — EKG IMPRESSION: NORMAL

## 2018-12-31 RX ORDER — BUPROPION HYDROCHLORIDE 75 MG/1
TABLET ORAL
Qty: 60 TAB | Refills: 0 | Status: SHIPPED | OUTPATIENT
Start: 2018-12-31 | End: 2019-01-23

## 2019-01-04 ENCOUNTER — APPOINTMENT (OUTPATIENT)
Dept: PHYSICAL THERAPY | Facility: REHABILITATION | Age: 54
End: 2019-01-04
Attending: INTERNAL MEDICINE
Payer: COMMERCIAL

## 2019-01-06 DIAGNOSIS — R63.5 WEIGHT GAIN: ICD-10-CM

## 2019-01-06 DIAGNOSIS — F33.41 RECURRENT MAJOR DEPRESSIVE DISORDER, IN PARTIAL REMISSION (HCC): ICD-10-CM

## 2019-01-08 ENCOUNTER — APPOINTMENT (OUTPATIENT)
Dept: MEDICAL GROUP | Facility: MEDICAL CENTER | Age: 54
End: 2019-01-08
Payer: COMMERCIAL

## 2019-01-08 RX ORDER — BUPROPION HYDROCHLORIDE 75 MG/1
TABLET ORAL
Qty: 60 TAB | Refills: 0 | Status: SHIPPED | OUTPATIENT
Start: 2019-01-08 | End: 2019-01-23 | Stop reason: CLARIF

## 2019-01-09 ENCOUNTER — APPOINTMENT (OUTPATIENT)
Dept: PHYSICAL THERAPY | Facility: REHABILITATION | Age: 54
End: 2019-01-09
Attending: INTERNAL MEDICINE
Payer: COMMERCIAL

## 2019-01-15 RX ORDER — CODEINE PHOSPHATE AND GUAIFENESIN 10; 100 MG/5ML; MG/5ML
SOLUTION ORAL
COMMUNITY
Start: 2018-12-17 | End: 2019-02-20

## 2019-01-15 RX ORDER — SULFAMETHOXAZOLE AND TRIMETHOPRIM 200; 40 MG/5ML; MG/5ML
SUSPENSION ORAL
COMMUNITY
Start: 2018-10-26 | End: 2019-01-23

## 2019-01-15 RX ORDER — BECLOMETHASONE DIPROPIONATE HFA 80 UG/1
AEROSOL, METERED RESPIRATORY (INHALATION)
COMMUNITY
Start: 2019-01-02 | End: 2019-12-19

## 2019-01-15 RX ORDER — PANTOPRAZOLE SODIUM 40 MG/1
TABLET, DELAYED RELEASE ORAL
COMMUNITY
Start: 2019-01-02 | End: 2019-12-19

## 2019-01-15 RX ORDER — CEPHALEXIN 500 MG/1
CAPSULE ORAL
COMMUNITY
Start: 2018-10-26 | End: 2019-01-23

## 2019-01-15 RX ORDER — HYDROCODONE BITARTRATE AND ACETAMINOPHEN 5; 325 MG/1; MG/1
TABLET ORAL
COMMUNITY
Start: 2018-10-26 | End: 2019-02-20

## 2019-01-15 RX ORDER — DOXYCYCLINE HYCLATE 100 MG
TABLET ORAL
COMMUNITY
Start: 2018-12-12 | End: 2019-02-20

## 2019-01-23 ENCOUNTER — OFFICE VISIT (OUTPATIENT)
Dept: HEALTH INFORMATION MANAGEMENT | Facility: MEDICAL CENTER | Age: 54
End: 2019-01-23
Payer: COMMERCIAL

## 2019-01-23 VITALS
SYSTOLIC BLOOD PRESSURE: 132 MMHG | HEIGHT: 70 IN | BODY MASS INDEX: 41.95 KG/M2 | HEART RATE: 73 BPM | OXYGEN SATURATION: 96 % | WEIGHT: 293 LBS | DIASTOLIC BLOOD PRESSURE: 80 MMHG

## 2019-01-23 DIAGNOSIS — E11.69 TYPE 2 DIABETES MELLITUS WITH OTHER SPECIFIED COMPLICATION, WITHOUT LONG-TERM CURRENT USE OF INSULIN (HCC): ICD-10-CM

## 2019-01-23 DIAGNOSIS — F41.9 ANXIETY: ICD-10-CM

## 2019-01-23 DIAGNOSIS — J45.20 INTERMITTENT ASTHMA WITHOUT COMPLICATION, UNSPECIFIED ASTHMA SEVERITY: ICD-10-CM

## 2019-01-23 DIAGNOSIS — E66.01 MORBID (SEVERE) OBESITY DUE TO EXCESS CALORIES (HCC): ICD-10-CM

## 2019-01-23 DIAGNOSIS — R63.5 WEIGHT GAIN: ICD-10-CM

## 2019-01-23 DIAGNOSIS — R63.2 BINGE EATING: ICD-10-CM

## 2019-01-23 PROCEDURE — 99214 OFFICE O/P EST MOD 30 MIN: CPT | Performed by: INTERNAL MEDICINE

## 2019-01-23 PROCEDURE — 97803 MED NUTRITION INDIV SUBSEQ: CPT | Performed by: DIETITIAN, REGISTERED

## 2019-01-23 RX ORDER — BUPROPION HYDROCHLORIDE 150 MG/1
150 TABLET ORAL EVERY MORNING
Qty: 30 TAB | Refills: 1 | Status: SHIPPED | OUTPATIENT
Start: 2019-01-23 | End: 2019-02-20 | Stop reason: SDUPTHER

## 2019-01-23 RX ORDER — LANCETS 30 GAUGE
EACH MISCELLANEOUS
Qty: 100 EACH | Refills: 1 | Status: SHIPPED | OUTPATIENT
Start: 2019-01-23 | End: 2019-10-03

## 2019-01-23 NOTE — PROGRESS NOTES
Bariatric Medicine Follow Up  Chief Complaint   Patient presents with   • Weight Gain       History of Present Illness:   Latia Almanza is a 53 y.o. female who presents for weight management follow-up and to help address co-morbidities related to overweight, including T2 DM, JOSE RAMON.    During the patient's last visit, the following were discussed and recommended:  Weight Goal: 3-5% wt loss each month (pt goal weight is 270 lb)  Diet: High Protein/Low Carb Meals and 2 snacks between meals daily  Okay to use collagen/whey-based protein for snack twice daily  >100 g protein, <100 g total carbs daily, around 1500 kcal per day  64+ oz water per day  Avoid sweet drinks and sodas  Track daily intake with my fitness pal, as she is doing  Physical Activity: Neighborhood walking, restart yoga  Risk level for moderate/vigorous exercise program: Moderate given poor exercise tolerance  New Rx: Start metformin 500 every morning, titrate up as tolerated  Side Effects: Possible diarrhea  Behavior change: Mindfulness, increase activity  Follow-up: 1 month  2 weeks with RD  Patient request more frequent visits     Has not been able to make changes.  Not tracking intake b/c not consistent.  Needs to feel better to be able to focus on wt loss.    Had been MM and casein based protein shake, not using recently.  Would be willing to sample a [place] meal replacement, did not like in the past    Has new dx of eosinophilic asthma, was on steroids.  Checking fasting glucose, now 100 on metformin.  Having vertigo, supposed to get maneuver therapy done when she has time.  Had a lot of anxiety off wellbutrin.  Wants to remain on it.    Exercise:   None     Review of Systems   Denies diaphoresis, lightheaded.  Having some vertigo.  All other ROS were reviewed and are otherwise unchanged from my previous visit with patient.    Physical Exam:    /80 (BP Location: Right arm, Patient Position: Sitting, BP Cuff Size: Large adult)   Pulse 73  "  Ht 1.778 m (5' 10\")   Wt (!) 145.7 kg (321 lb 1.6 oz)   SpO2 96%   BMI 46.07 kg/m²         Weight change since last visit: +5 lb (+3 lb total)  Waist Circum change since last visit: +2 in (+1 in total)    Constitutional: Oriented to person, place, and time and well-developed, well-nourished, and in no distress.    Head: Normocephalic.   Musculoskeletal: Normal range of motion. No edema.   Neurological: Alert and oriented to person, place, and time. No muscle weakness.  Gait normal.   Skin: Warm and dry. Not diaphoretic.   Psychiatric: Mood, memory, affect and judgment normal.     Laboratory:   Recent labs reviewed.      Dietitian Assessment: I have reviewed the Dietitian's assessment related to this encounter.       ASSESSMENT/PLAN:  Body mass index is 46.07 kg/m².    Obesity Stage ([place]): 2; Class 3    1. Binge eating     2. Type 2 diabetes mellitus with other specified complication, without long-term current use of insulin ([facility])     3. BMI 45.0-49.9, adult (CMS-HCC)- 45.7     4. Intermittent asthma without complication, unspecified asthma severity     5. Weight gain       The patient has struggled towards her weight loss goals, would benefit from stimulus narrowing, likely also anti-obesity medication.  Focus on starting a meal replacement she will stick with, that she likes, that should help improve binge eating, reduce fasting glucose.  Weight gain likely related to heavy steroid use recently, but can reverse with anti-obesity medication if she is willing to try.    The patient and I have discussed at length and agree to the following recommendations, which are all addressing the above diagnoses:    Weight Goal: 3-5% wt loss each month (pt goal weight is 270 lb)  Diet: 2 meal replacements per day, with muscle milk or [employer] based  We will also start multivitamin  Physical Activity: Set goals for exercise program next visit  Risk level for moderate/vigorous exercise program: Moderate given asthma  New Rx: " None.  Increase Wellbutrin.  Consider Contrave.  Consider increasing Victoza to 3 mg  Behavior change: Commitment to significant change  Follow-up: 1 month    Patient's body mass index is 46.07 kg/m². Exercise and nutrition counseling were performed at this visit.

## 2019-01-23 NOTE — PROGRESS NOTES
"Nutrition Reassess: Medical Weight Management   Today's date: 1/23/2019  Referring Provider: Terry Currie MD      Time: in/out  1100-1126am   Visit#: 4    Subjective:  -Pt states she has been focusing on her breathing and health, and therefore not thinking about her eating habits or choices   -Is not tracking  -Not using meal replacements but thinks using 2 a day would be good to do for a while   -DIdnt like muscle milk bc it wasn't clean\" but now understands it is still healthier than pizza or fast food that she has been eating lately       Anthropometrics/Objective  Today's weight:    Vitals:    01/23/19 1043   BP: 132/80   Weight: (!) 145.7 kg (321 lb 1.6 oz)   Height: 1.778 m (5' 10\")     BMI:  Body mass index is 46.07 kg/m².  Starting weight/date 317.5lb      Total weight change : +3.6lb            Meal Plan:   High protein diet  with 2 meal replacements per day for breakfast and lunch   2-3 high protein snacks daily   Plate method for dinner   Multivitamin with minerals daily     ReAssesment/Notes:  Pt feels that because her schedule has been hectic and bc of stress she feels that using meal replacements would be the best option. Today we went through a meal plan and reviewed snack ideas (cottage cheese, greek yogurt, hb egg, nuts, string cheese etc). Recommended she stop in a gas station if on the go to get one of these healthy snacks.   She is not certain yet what MR she will use but didn't like Robdemetra ; gave her the list of options and macronutrient content to look for when choosing one.     Follow-up: 2 weeks with ARYAN, 4 weeks with MD/ARYAN   "

## 2019-02-12 ENCOUNTER — HOSPITAL ENCOUNTER (OUTPATIENT)
Dept: LAB | Facility: MEDICAL CENTER | Age: 54
End: 2019-02-12
Attending: INTERNAL MEDICINE
Payer: COMMERCIAL

## 2019-02-12 DIAGNOSIS — D72.819 LEUKOPENIA, UNSPECIFIED TYPE: ICD-10-CM

## 2019-02-12 DIAGNOSIS — D86.9 SARCOIDOSIS: ICD-10-CM

## 2019-02-12 DIAGNOSIS — R73.01 IFG (IMPAIRED FASTING GLUCOSE): ICD-10-CM

## 2019-02-12 LAB
ALBUMIN SERPL BCP-MCNC: 4.4 G/DL (ref 3.2–4.9)
ALBUMIN/GLOB SERPL: 1.7 G/DL
ALP SERPL-CCNC: 86 U/L (ref 30–99)
ALT SERPL-CCNC: 56 U/L (ref 2–50)
ANION GAP SERPL CALC-SCNC: 5 MMOL/L (ref 0–11.9)
AST SERPL-CCNC: 36 U/L (ref 12–45)
BASOPHILS # BLD AUTO: 1 % (ref 0–1.8)
BASOPHILS # BLD: 0.05 K/UL (ref 0–0.12)
BILIRUB SERPL-MCNC: 0.6 MG/DL (ref 0.1–1.5)
BUN SERPL-MCNC: 16 MG/DL (ref 8–22)
CALCIUM SERPL-MCNC: 9.7 MG/DL (ref 8.5–10.5)
CHLORIDE SERPL-SCNC: 104 MMOL/L (ref 96–112)
CO2 SERPL-SCNC: 26 MMOL/L (ref 20–33)
CREAT SERPL-MCNC: 0.96 MG/DL (ref 0.5–1.4)
CREAT UR-MCNC: 306.7 MG/DL
CRP SERPL HS-MCNC: 0.79 MG/DL (ref 0–0.75)
EOSINOPHIL # BLD AUTO: 0.13 K/UL (ref 0–0.51)
EOSINOPHIL NFR BLD: 2.7 % (ref 0–6.9)
ERYTHROCYTE [DISTWIDTH] IN BLOOD BY AUTOMATED COUNT: 53.7 FL (ref 35.9–50)
ERYTHROCYTE [SEDIMENTATION RATE] IN BLOOD BY WESTERGREN METHOD: 18 MM/HOUR (ref 0–30)
EST. AVERAGE GLUCOSE BLD GHB EST-MCNC: 126 MG/DL
FASTING STATUS PATIENT QL REPORTED: NORMAL
GLOBULIN SER CALC-MCNC: 2.6 G/DL (ref 1.9–3.5)
GLUCOSE SERPL-MCNC: 115 MG/DL (ref 65–99)
HBA1C MFR BLD: 6 % (ref 0–5.6)
HCT VFR BLD AUTO: 43.4 % (ref 37–47)
HGB BLD-MCNC: 13.8 G/DL (ref 12–16)
IMM GRANULOCYTES # BLD AUTO: 0.01 K/UL (ref 0–0.11)
IMM GRANULOCYTES NFR BLD AUTO: 0.2 % (ref 0–0.9)
LYMPHOCYTES # BLD AUTO: 1.27 K/UL (ref 1–4.8)
LYMPHOCYTES NFR BLD: 26.3 % (ref 22–41)
MCH RBC QN AUTO: 28.9 PG (ref 27–33)
MCHC RBC AUTO-ENTMCNC: 31.8 G/DL (ref 33.6–35)
MCV RBC AUTO: 90.8 FL (ref 81.4–97.8)
MICROALBUMIN UR-MCNC: 3.7 MG/DL
MICROALBUMIN/CREAT UR: 12 MG/G (ref 0–30)
MONOCYTES # BLD AUTO: 0.45 K/UL (ref 0–0.85)
MONOCYTES NFR BLD AUTO: 9.3 % (ref 0–13.4)
NEUTROPHILS # BLD AUTO: 2.92 K/UL (ref 2–7.15)
NEUTROPHILS NFR BLD: 60.5 % (ref 44–72)
NRBC # BLD AUTO: 0 K/UL
NRBC BLD-RTO: 0 /100 WBC
PLATELET # BLD AUTO: 370 K/UL (ref 164–446)
PMV BLD AUTO: 9.8 FL (ref 9–12.9)
POTASSIUM SERPL-SCNC: 4.2 MMOL/L (ref 3.6–5.5)
PROT SERPL-MCNC: 7 G/DL (ref 6–8.2)
RBC # BLD AUTO: 4.78 M/UL (ref 4.2–5.4)
RHEUMATOID FACT SER IA-ACNC: <10 IU/ML (ref 0–14)
SODIUM SERPL-SCNC: 135 MMOL/L (ref 135–145)
WBC # BLD AUTO: 4.8 K/UL (ref 4.8–10.8)

## 2019-02-12 PROCEDURE — 82043 UR ALBUMIN QUANTITATIVE: CPT

## 2019-02-12 PROCEDURE — 82570 ASSAY OF URINE CREATININE: CPT

## 2019-02-12 PROCEDURE — 86140 C-REACTIVE PROTEIN: CPT

## 2019-02-12 PROCEDURE — 85025 COMPLETE CBC W/AUTO DIFF WBC: CPT

## 2019-02-12 PROCEDURE — 86038 ANTINUCLEAR ANTIBODIES: CPT

## 2019-02-12 PROCEDURE — 80053 COMPREHEN METABOLIC PANEL: CPT

## 2019-02-12 PROCEDURE — 85652 RBC SED RATE AUTOMATED: CPT

## 2019-02-12 PROCEDURE — 86431 RHEUMATOID FACTOR QUANT: CPT

## 2019-02-12 PROCEDURE — 83036 HEMOGLOBIN GLYCOSYLATED A1C: CPT

## 2019-02-12 PROCEDURE — 36415 COLL VENOUS BLD VENIPUNCTURE: CPT

## 2019-02-13 ENCOUNTER — OFFICE VISIT (OUTPATIENT)
Dept: HEALTH INFORMATION MANAGEMENT | Facility: MEDICAL CENTER | Age: 54
End: 2019-02-13
Payer: COMMERCIAL

## 2019-02-13 VITALS
HEART RATE: 78 BPM | SYSTOLIC BLOOD PRESSURE: 134 MMHG | WEIGHT: 293 LBS | HEIGHT: 70 IN | DIASTOLIC BLOOD PRESSURE: 80 MMHG | BODY MASS INDEX: 41.95 KG/M2 | OXYGEN SATURATION: 96 %

## 2019-02-13 DIAGNOSIS — R63.5 WEIGHT GAIN: ICD-10-CM

## 2019-02-13 DIAGNOSIS — G47.33 OBSTRUCTIVE SLEEP APNEA SYNDROME: ICD-10-CM

## 2019-02-13 DIAGNOSIS — R63.2 BINGE EATING: ICD-10-CM

## 2019-02-13 DIAGNOSIS — E11.69 TYPE 2 DIABETES MELLITUS WITH OTHER SPECIFIED COMPLICATION, WITHOUT LONG-TERM CURRENT USE OF INSULIN (HCC): ICD-10-CM

## 2019-02-13 PROCEDURE — 99214 OFFICE O/P EST MOD 30 MIN: CPT | Performed by: INTERNAL MEDICINE

## 2019-02-13 NOTE — PROGRESS NOTES
"Bariatric Medicine Follow Up  Chief Complaint   Patient presents with   • Weight Gain       History of Present Illness:   Latia Almanza is a 53 y.o. female who presents for weight management follow-up and to help address co-morbidities related to overweight, including T2DM.    During the patient's last visit, the following were discussed and recommended:  Weight Goal: 3-5% wt loss each month (pt goal weight is 270 lb)  Diet: 2 meal replacements per day, with muscle milk or GNC based  We will also start multivitamin  Physical Activity: Set goals for exercise program next visit  Risk level for moderate/vigorous exercise program: Moderate given asthma  New Rx: None.  Increase Wellbutrin.  Consider Contrave.  Consider adding Victoza 3 mg  Behavior change: Commitment to significant change  Follow-up: 1 month    Still overeating in evenings, too many quick and easy foods.  Feels like she is changing her behavior.  Wellbutrin helping, feels brighter.  Getting more sleep.  Trying to stop work sooner.    Having one MM per day and GNC protein shake daily.  Eggs with berries in am for snack.  Not having afternoon snack.    Fasting glucose 125 in am.  Would like to continue current dose of Wellbutrin.    Exercise:   None given weather is bad  Has [employer], renewed subscription to it     Review of Systems   Denies depressive symptoms, excessive fatigue or insomnia.  All other ROS were reviewed and are otherwise unchanged from my previous visit with patient.    Physical Exam:    /80 (BP Location: Left arm, Patient Position: Sitting, BP Cuff Size: Large adult)   Pulse 78   Ht 1.778 m (5' 10\")   Wt (!) 146.7 kg (323 lb 6.4 oz)   SpO2 96%   BMI 46.40 kg/m²      Weight change since last visit: +2 lb (+5 lb total)  Waist Circum change since last visit: 0 in (+1 in total)    Constitutional: Oriented to person, place, and time and well-developed, well-nourished, and in no distress.    Head: Normocephalic. "   Musculoskeletal: Normal range of motion. No edema.   Neurological: Alert and oriented to person, place, and time. No muscle weakness.  Gait normal.   Skin: Warm and dry. Not diaphoretic.   Psychiatric: Mood, memory, affect and judgment normal.     Laboratory:   Recent labs reviewed.      ASSESSMENT/PLAN:  Body mass index is 46.4 kg/m².    Obesity Stage ([place]):  2; Class 3    1. Type 2 diabetes mellitus with other specified complication, without long-term current use of insulin ([facility])     2. Obstructive sleep apnea syndrome     3. Binge eating     4. Weight gain     5. BMI 45.0-49.9, adult (CMS-HCC)- 45.7       Although the patient has had some weight gain since starting the program, she feels behaviorally she is beginning to make some changes, eating more mindfully, using stimulus narrowing to help and binge eating less.  If she continues to gain weight, will need to look at anti-obesity medication options, possible referral to bariatric surgery.  Continue to monitor fasting glucose, sleep as she progresses through the program.    The patient and I have discussed at length and agree to the following recommendations, which are all addressing the above diagnoses:    Weight Goal: 3-5% wt loss each month (pt goal weight is 270 lb)  Diet: High Protein/Low Carb Meals and 2 snacks between meals daily  1 Premier protein shake per day, 1 GNC shake per day  >100 g protein, <100 g total carbs daily if tracking  64+ oz water per day  Avoid sweet drinks and sodas  Track daily intake if possible, especially given weight gain  Physical Activity:  Start [employer], has bike and edwar  Wants to walk more  Risk level for moderate/vigorous exercise program: moderate  New Rx: None.  Consider increasing Wellbutrin or changing to Contrave pending course  Behavior change: Tracking intake.  Put snacks in car. Considering behavioral health eval  Follow-up: 1 mo    Patient's body mass index is 46.4 kg/m². Exercise and nutrition counseling  were performed at this visit.

## 2019-02-14 LAB — NUCLEAR IGG SER QL IA: NORMAL

## 2019-02-20 ENCOUNTER — OFFICE VISIT (OUTPATIENT)
Dept: MEDICAL GROUP | Facility: MEDICAL CENTER | Age: 54
End: 2019-02-20
Payer: COMMERCIAL

## 2019-02-20 VITALS
HEART RATE: 70 BPM | OXYGEN SATURATION: 95 % | BODY MASS INDEX: 41.95 KG/M2 | SYSTOLIC BLOOD PRESSURE: 128 MMHG | DIASTOLIC BLOOD PRESSURE: 84 MMHG | WEIGHT: 293 LBS | TEMPERATURE: 98.8 F | HEIGHT: 70 IN

## 2019-02-20 DIAGNOSIS — E61.1 IRON DEFICIENCY: ICD-10-CM

## 2019-02-20 DIAGNOSIS — D86.9 SARCOIDOSIS: ICD-10-CM

## 2019-02-20 DIAGNOSIS — Z12.39 SCREENING FOR MALIGNANT NEOPLASM OF BREAST: ICD-10-CM

## 2019-02-20 DIAGNOSIS — R73.01 IFG (IMPAIRED FASTING GLUCOSE): ICD-10-CM

## 2019-02-20 DIAGNOSIS — R74.01 TRANSAMINITIS: ICD-10-CM

## 2019-02-20 DIAGNOSIS — E55.9 HYPOVITAMINOSIS D: ICD-10-CM

## 2019-02-20 DIAGNOSIS — Z00.00 HEALTH CARE MAINTENANCE: ICD-10-CM

## 2019-02-20 DIAGNOSIS — D72.819 LEUKOPENIA, UNSPECIFIED TYPE: ICD-10-CM

## 2019-02-20 DIAGNOSIS — R06.2 WHEEZING: ICD-10-CM

## 2019-02-20 DIAGNOSIS — F33.41 RECURRENT MAJOR DEPRESSIVE DISORDER, IN PARTIAL REMISSION (HCC): ICD-10-CM

## 2019-02-20 DIAGNOSIS — F41.1 GAD (GENERALIZED ANXIETY DISORDER): ICD-10-CM

## 2019-02-20 DIAGNOSIS — J30.2 SEASONAL ALLERGIES: ICD-10-CM

## 2019-02-20 PROBLEM — E11.9 DM (DIABETES MELLITUS) (HCC): Status: RESOLVED | Noted: 2017-11-24 | Resolved: 2019-02-20

## 2019-02-20 PROCEDURE — 99214 OFFICE O/P EST MOD 30 MIN: CPT | Performed by: INTERNAL MEDICINE

## 2019-02-20 RX ORDER — BUPROPION HYDROCHLORIDE 150 MG/1
150 TABLET ORAL EVERY MORNING
Qty: 90 TAB | Refills: 1 | Status: SHIPPED | OUTPATIENT
Start: 2019-02-20 | End: 2019-02-20

## 2019-02-20 ASSESSMENT — PATIENT HEALTH QUESTIONNAIRE - PHQ9
3. TROUBLE FALLING OR STAYING ASLEEP OR SLEEPING TOO MUCH: 0
8. MOVING OR SPEAKING SO SLOWLY THAT OTHER PEOPLE COULD HAVE NOTICED. OR THE OPPOSITE, BEING SO FIGETY OR RESTLESS THAT YOU HAVE BEEN MOVING AROUND A LOT MORE THAN USUAL: 0
5. POOR APPETITE OR OVEREATING: 1
9. THOUGHTS THAT YOU WOULD BE BETTER OFF DEAD, OR OF HURTING YOURSELF: 0
1. LITTLE INTEREST OR PLEASURE IN DOING THINGS: 1
6. FEELING BAD ABOUT YOURSELF - OR THAT YOU ARE A FAILURE OR HAVE LET YOURSELF OR YOUR FAMILY DOWN: 0
2. FEELING DOWN, DEPRESSED, IRRITABLE, OR HOPELESS: 0
7. TROUBLE CONCENTRATING ON THINGS, SUCH AS READING THE NEWSPAPER OR WATCHING TELEVISION: 1
SUM OF ALL RESPONSES TO PHQ QUESTIONS 1-9: 4
1. LITTLE INTEREST OR PLEASURE IN DOING THINGS: 1
2. FEELING DOWN, DEPRESSED, IRRITABLE, OR HOPELESS: 0
4. FEELING TIRED OR HAVING LITTLE ENERGY: 1
SUM OF ALL RESPONSES TO PHQ9 QUESTIONS 1 AND 2: 1
SUM OF ALL RESPONSES TO PHQ9 QUESTIONS 1 AND 2: 1
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
## 2019-03-05 ENCOUNTER — APPOINTMENT (OUTPATIENT)
Dept: RADIOLOGY | Facility: MEDICAL CENTER | Age: 54
End: 2019-03-05
Attending: INTERNAL MEDICINE
Payer: COMMERCIAL

## 2019-03-05 DIAGNOSIS — N64.4 BREAST PAIN, RIGHT: ICD-10-CM

## 2019-03-15 ENCOUNTER — APPOINTMENT (OUTPATIENT)
Dept: RADIOLOGY | Facility: MEDICAL CENTER | Age: 54
End: 2019-03-15
Attending: INTERNAL MEDICINE
Payer: COMMERCIAL

## 2019-08-08 DIAGNOSIS — Z79.890 POSTMENOPAUSAL HRT (HORMONE REPLACEMENT THERAPY): ICD-10-CM

## 2019-08-08 RX ORDER — ESTRADIOL 0.05 MG/D
PATCH, EXTENDED RELEASE TRANSDERMAL
Qty: 8 PATCH | Refills: 0 | Status: SHIPPED | OUTPATIENT
Start: 2019-08-08 | End: 2019-09-12 | Stop reason: SDUPTHER

## 2019-08-18 DIAGNOSIS — Z79.890 POSTMENOPAUSAL HRT (HORMONE REPLACEMENT THERAPY): ICD-10-CM

## 2019-08-19 RX ORDER — ESTRADIOL 0.05 MG/D
PATCH, EXTENDED RELEASE TRANSDERMAL
Qty: 8 PATCH | Refills: 0 | Status: SHIPPED
Start: 2019-08-19 | End: 2019-12-19

## 2019-09-12 ENCOUNTER — OFFICE VISIT (OUTPATIENT)
Dept: MEDICAL GROUP | Facility: MEDICAL CENTER | Age: 54
End: 2019-09-12
Payer: COMMERCIAL

## 2019-09-12 VITALS
TEMPERATURE: 97.9 F | HEART RATE: 80 BPM | SYSTOLIC BLOOD PRESSURE: 138 MMHG | OXYGEN SATURATION: 95 % | HEIGHT: 70 IN | DIASTOLIC BLOOD PRESSURE: 80 MMHG | BODY MASS INDEX: 41.95 KG/M2 | WEIGHT: 293 LBS

## 2019-09-12 DIAGNOSIS — E78.00 HYPERCHOLESTEROLEMIA: ICD-10-CM

## 2019-09-12 DIAGNOSIS — R74.01 TRANSAMINITIS: ICD-10-CM

## 2019-09-12 DIAGNOSIS — R73.01 IFG (IMPAIRED FASTING GLUCOSE): ICD-10-CM

## 2019-09-12 DIAGNOSIS — R63.2 BINGE EATING: ICD-10-CM

## 2019-09-12 DIAGNOSIS — Z79.890 POSTMENOPAUSAL HRT (HORMONE REPLACEMENT THERAPY): ICD-10-CM

## 2019-09-12 DIAGNOSIS — Z12.39 SCREENING FOR MALIGNANT NEOPLASM OF BREAST: ICD-10-CM

## 2019-09-12 DIAGNOSIS — M54.12 CERVICAL RADICULOPATHY: ICD-10-CM

## 2019-09-12 DIAGNOSIS — M62.838 MUSCLE SPASM: ICD-10-CM

## 2019-09-12 PROBLEM — J82.83 EOSINOPHILIC ASTHMA: Status: ACTIVE | Noted: 2019-09-12

## 2019-09-12 PROCEDURE — 99214 OFFICE O/P EST MOD 30 MIN: CPT | Performed by: INTERNAL MEDICINE

## 2019-09-12 RX ORDER — ESTRADIOL 0.05 MG/D
PATCH, EXTENDED RELEASE TRANSDERMAL
Qty: 24 PATCH | Refills: 3 | Status: ON HOLD
Start: 2019-09-12 | End: 2019-12-23

## 2019-09-12 RX ORDER — CYCLOBENZAPRINE HCL 5 MG
5-10 TABLET ORAL 3 TIMES DAILY PRN
Qty: 40 TAB | Refills: 1 | Status: SHIPPED | OUTPATIENT
Start: 2019-09-12 | End: 2019-11-01 | Stop reason: SDUPTHER

## 2019-09-12 NOTE — PROGRESS NOTES
CHIEF COMPLAINT  Chief Complaint   Patient presents with   • Follow-Up   • Medication Refill   Estradiol    HPI  Latia Almanza is a 54 y.o. female who presents today for the following     IFG  The patient had elevated FBG, and A1c at 6.0.  No polydipsia, polyphagia, polyuria.  No abdominal pain, weight loss, fatigue.  Diet: low maria e  Exercise: increased  On metformin 500 mg QD  BMI: 46  FH of DM: parents    Hypercholesterolemia, transaminitis  The patient has slightly elevated cholesterol, no medications.  Diet /Exercise/BMI:  As above  FH: unknown  Labs showed slightly elevated ALT.    Obesity, BMI 46  No change  She was evaluated for bariatric surgery              -Referred to Dr Tinoco for management, did not continue f/u     Onset: after hysterectomy, at the age of 46 y/o  Diet: regular  Exercise: limited  No temperature intolerance. No change in hair/skin quality, BMs.   No HTN, buffalo hump, purple striae, flushing.  FH of obesity: mother     Cervical radiculopathy, muscle spasm  Complains of intermittent pain in the right shoulder, radiating to the arm.  Muscles are stiff.  She does not complain of neck pain.  No trauma, fever, chills, numbness or tingling.  Naproxen did not help.     Reviewed PMH, PSH, FH, SH, ALL, HCM/IMM, no changes  Reviewed MEDS, no changes    Patient Active Problem List    Diagnosis Date Noted   • Transaminitis 01/29/2018     Priority: Medium   • Hypokalemia 01/29/2018     Priority: Medium   • Sleep apnea 04/20/2015     Priority: Medium   • Screening for malignant neoplasm of breast 02/20/2019   • Anxiety 01/23/2019   • Trigger finger of right thumb 10/17/2018   • Binge eating 09/25/2018   • Weight gain 09/25/2018   • Intermittent asthma without complication 03/26/2018   • Chronic seasonal allergic rhinitis due to pollen 03/26/2018   • Leukopenia 01/29/2018   • Influenza 01/29/2018   • Sarcoidosis 01/24/2018   • BMI 45.0-49.9, adult (CMS-HCC)- 45.7 11/24/2017   •  Pre-operative cardiovascular examination 11/17/2017   • Recurrent major depressive disorder, in partial remission (HCC) 03/03/2017   • Degeneration of lumbar intervertebral disc 10/27/2016   • Wheezing 01/20/2016   • Postmenopausal HRT (hormone replacement therapy) 01/20/2016   • H/O hysterectomy with oophorectomy 04/20/2015   • S/P hip replacement 04/20/2015     CURRENT MEDICATIONS  Current Outpatient Medications   Medication Sig Dispense Refill   • dupilumab (DUPIXENT) 300 MG/2ML Solution Prefilled Syringe injection Inject  as instructed Once.     • estradiol (VIVELLE DOT) 0.05 MG/24HR PATCH BIWEEKLY APPLY 1 PATCH TOPICALLY TO CLEAN, DRY SKIN AS DIRECTED 2 TIMES A WEEK. REMOVE OLD PATCH BEFORE APPLYING NEW PATCH 8 Patch 0   • Lancets Use bid 100 Each 1   • Blood Glucose Test Strips Use bid 1 Container 1   • QVAR REDIHALER 80 MCG/ACT inhaler      • ALBUTEROL INH Inhale  by mouth.     • fluticasone-salmeterol (ADVAIR DISKUS) 250-50 MCG/DOSE AEROSOL POWDER, BREATH ACTIVATED Advair Diskus 250 mcg-50 mcg/dose powder for inhalation     • estradiol (VIVELLE DOT) 0.05 MG/24HR PATCH BIWEEKLY APPLY 1 PATCH TOPICALLY TO CLEAN, DRY SKIN AS DIRECTED 2 TIMES A WEEK. REMOVE OLD PATCH BEFORE APPLYING NEW PATCH 8 Patch 0   • metFORMIN (GLUCOPHAGE) 500 MG Tab Take 1 Tab by mouth every day. (Patient not taking: Reported on 9/12/2019) 90 Tab 1   • pantoprazole (PROTONIX) 40 MG Tablet Delayed Response      • SPIRIVA RESPIMAT 1.25 MCG/ACT Aero Soln        No current facility-administered medications for this visit.      ALLERGIES  Allergies: Patient has no known allergies.  PAST MEDICAL HISTORY  Past Medical History:   Diagnosis Date   • Bronchitis 2017   • Pneumonia 2017   • Allergic rhinitis    • Anxiety    • Asthma     daily and as needed inhalers   • Back pain     back/ right hip   • Dental disorder     upper implants   • Fatty liver     per pt hx   • Heart burn    • Heart murmur     functional murmur   • Obesity    • Restless leg  "syndrome    • Sarcoidosis    • Sciatica    • Sleep apnea     CPAP   • Snoring      SURGICAL HISTORY  She  has a past surgical history that includes abdominal hysterectomy total (); hip arthroplasty total (Right, ); laparotomy (); sinuscope (); other (); other orthopedic surgery (Right, ); lumbar fusion anterior (10/27/2016); gastroscopy (N/A, 2018); trigger finger release (Right, 10/17/2018); and cyst excision (10/17/2018).  SOCIAL HISTORY  Social History     Tobacco Use   • Smoking status: Former Smoker     Packs/day: 0.25     Years: 5.00     Pack years: 1.25     Types: Cigarettes     Last attempt to quit: 2001     Years since quittin.6   • Smokeless tobacco: Never Used   Substance Use Topics   • Alcohol use: Yes     Comment: occasionally   • Drug use: No     Social History     Social History Narrative   • Not on file     FAMILY HISTORY  Family History   Problem Relation Age of Onset   • Lung Disease Mother         COPD, asthma   • Diabetes Mother    • Diabetes Father    • Heart Disease Father      Family Status   Relation Name Status   • Mo     • Fa  Alive       ROS   Constitutional: Negative for fever, chills, fatigue.  Eyes: Negative for vision problems.   Respiratory: Negative for cough, shortness of breath.  Cardiovascular: Negative for chest pain, palpitations.   Gastrointestinal: Negative for heartburn, nausea, abdominal pain.   Genitourinary: Negative for dysuria.  Musculoskeletal: As above.   Skin: Negative for rash.   Neuro: Negative for dizziness, weakness and headaches.   Endo/Heme/Allergies: Does not bruise/bleed easily.   Psychiatric/Behavioral: Negative for depression.    PHYSICAL EXAM   Blood Pressure 138/80   Pulse 80   Temperature 36.6 °C (97.9 °F) (Temporal)   Height 1.778 m (5' 10\")   Weight (Abnormal) 146.4 kg (322 lb 12.1 oz)   Oxygen Saturation 95%  Body mass index is 46.31 kg/m².  General:  NAD, well appearing  HEENT:   NC/AT, PERRLA, EOMI, " TMs are clear. Oropharyngeal mucosa is pink,  without lesions;  no cervical / supraclavicular  lymphadenopathy, no thyromegaly.    Cardiovascular: RRR.   No m/r/g.       Lungs:   CTAB, no w/r/r, no respiratory distress.  Abdomen: Soft, NT/ND; unable to palpate liver/spleen due to WT.  Extremities:  2+ DP and radial pulses bilaterally.  No c/c/e.   Skin:  Warm, dry.  No erythema. No rash.   Neurologic: Alert & oriented x 3. CN II-XII grossly intact. No focal deficits.  Psychiatric:  Affect normal, mood normal, judgment normal.  MS: Spasm of neck and bilateral shoulder muscles, more on the left.    Labs     Labs are reviewed and discussed with a patient  Lab Results   Component Value Date/Time    CHOLSTRLTOT 185 05/08/2018 02:19 PM     (H) 05/08/2018 02:19 PM    HDL 68 05/08/2018 02:19 PM    TRIGLYCERIDE 70 05/08/2018 02:19 PM       Lab Results   Component Value Date/Time    SODIUM 135 02/12/2019 09:25 AM    POTASSIUM 4.2 02/12/2019 09:25 AM    CHLORIDE 104 02/12/2019 09:25 AM    CO2 26 02/12/2019 09:25 AM    GLUCOSE 115 (H) 02/12/2019 09:25 AM    BUN 16 02/12/2019 09:25 AM    CREATININE 0.96 02/12/2019 09:25 AM     Lab Results   Component Value Date/Time    ALKPHOSPHAT 86 02/12/2019 09:25 AM    ASTSGOT 36 02/12/2019 09:25 AM    ALTSGPT 56 (H) 02/12/2019 09:25 AM    TBILIRUBIN 0.6 02/12/2019 09:25 AM      Lab Results   Component Value Date/Time    HBA1C 6.0 (H) 02/12/2019 09:25 AM    HBA1C 6.2 (H) 05/08/2018 02:19 PM    HBA1C 6.3 (H) 11/24/2017 11:05 AM     Lab Results   Component Value Date/Time    WBC 4.8 02/12/2019 09:25 AM    RBC 4.78 02/12/2019 09:25 AM    HEMOGLOBIN 13.8 02/12/2019 09:25 AM    HEMATOCRIT 43.4 02/12/2019 09:25 AM    MCV 90.8 02/12/2019 09:25 AM    MCH 28.9 02/12/2019 09:25 AM    MCHC 31.8 (L) 02/12/2019 09:25 AM    MPV 9.8 02/12/2019 09:25 AM    NEUTSPOLYS 60.50 02/12/2019 09:25 AM    LYMPHOCYTES 26.30 02/12/2019 09:25 AM    MONOCYTES 9.30 02/12/2019 09:25 AM    EOSINOPHILS 2.70 02/12/2019  09:25 AM    BASOPHILS 1.00 02/12/2019 09:25 AM        Imaging     None    Assessment and Plan     Latia Almanza is a 54 y.o. female    1. IFG (impaired fasting glucose)  Pending labs, advised low-calorie diet, daily exercise    2. Hypercholesterolemia  Pending labs, advised as above    3. Transaminitis  Probably fatty liver, pending labs    4. BMI 45.0-49.9, adult (CMS-[facility])- 45.7  - Patient identified as having weight management issue.  Appropriate orders and counseling given.  5. Binge eating  As above    6. Postmenopausal HRT (hormone replacement therapy)  Controlled postmenopausal symptoms, continue current treatment  - estradiol (VIVELLE DOT) 0.05 MG/24HR PATCH BIWEEKLY; APPLY 1 PATCH TOPICALLY TO CLEAN, DRY SKIN AS DIRECTED 2 TIMES A WEEK. REMOVE OLD PATCH BEFORE APPLYING NEW PATCH  Dispense: 24 Patch; Refill: 3    7. Screening for malignant neoplasm of breast  - MA-SCREEN MAMMO W/CAD-BILAT; Future    8. Cervical radiculopathy  Continue activity as tolerated  Massage can help  - DX-CERVICAL SPINE-2 OR 3 VIEWS; Future  - REFERRAL TO PHYSICAL THERAPY Reason for Therapy: Eval/Treat/Report  - cyclobenzaprine (FLEXERIL) 5 MG tablet; Take 1-2 Tabs by mouth 3 times a day as needed.  Dispense: 40 Tab; Refill: 1    9. Muscle spasm  - cyclobenzaprine (FLEXERIL) 5 MG tablet; Take 1-2 Tabs by mouth 3 times a day as needed.  Dispense: 40 Tab; Refill: 1      Counseling:   - Smoking:  Nonsmoker    Followup: Within 6 weeks with labs    All questions are answered.    Please note that this dictation was created using voice recognition software, and that there might be errors of earl and possibly content.

## 2019-09-18 ENCOUNTER — APPOINTMENT (OUTPATIENT)
Dept: HEALTH INFORMATION MANAGEMENT | Facility: MEDICAL CENTER | Age: 54
End: 2019-09-18
Payer: COMMERCIAL

## 2019-09-26 ENCOUNTER — APPOINTMENT (OUTPATIENT)
Dept: HEALTH INFORMATION MANAGEMENT | Facility: MEDICAL CENTER | Age: 54
End: 2019-09-26
Payer: COMMERCIAL

## 2019-10-02 ENCOUNTER — APPOINTMENT (OUTPATIENT)
Dept: HEALTH INFORMATION MANAGEMENT | Facility: MEDICAL CENTER | Age: 54
End: 2019-10-02
Payer: COMMERCIAL

## 2019-10-03 ENCOUNTER — OFFICE VISIT (OUTPATIENT)
Dept: HEALTH INFORMATION MANAGEMENT | Facility: MEDICAL CENTER | Age: 54
End: 2019-10-03
Payer: COMMERCIAL

## 2019-10-03 ENCOUNTER — APPOINTMENT (OUTPATIENT)
Dept: PHYSICAL THERAPY | Facility: MEDICAL CENTER | Age: 54
End: 2019-10-03
Payer: COMMERCIAL

## 2019-10-03 VITALS
OXYGEN SATURATION: 92 % | HEIGHT: 70 IN | HEART RATE: 69 BPM | SYSTOLIC BLOOD PRESSURE: 136 MMHG | DIASTOLIC BLOOD PRESSURE: 86 MMHG | WEIGHT: 293 LBS | BODY MASS INDEX: 41.95 KG/M2

## 2019-10-03 DIAGNOSIS — E78.00 HYPERCHOLESTEROLEMIA: ICD-10-CM

## 2019-10-03 DIAGNOSIS — R63.2 BINGE EATING: ICD-10-CM

## 2019-10-03 DIAGNOSIS — R73.01 IFG (IMPAIRED FASTING GLUCOSE): ICD-10-CM

## 2019-10-03 PROCEDURE — 99214 OFFICE O/P EST MOD 30 MIN: CPT | Performed by: INTERNAL MEDICINE

## 2019-10-03 NOTE — PROGRESS NOTES
Bariatric Medicine Follow Up  Chief Complaint   Patient presents with   • Weight Gain       History of Present Illness:   Latia Almanza is a 54 y.o. female who presents for weight management follow-up and to help address co-morbidities caused by overweight, as below.    During the patient's last visit, the following were discussed and recommended:  Weight Goal: 3-5% wt loss each month (pt goal weight is 270 lb)  Diet: High Protein/Low Carb Meals and 2 snacks between meals daily  1 Premier protein shake per day, 1 GNC shake per day  >100 g protein, <100 g total carbs daily if tracking  64+ oz water per day  Avoid sweet drinks and sodas  Track daily intake if possible, especially given weight gain  Physical Activity:  Start Peloton, has bike and edwar  Wants to walk more  Risk level for moderate/vigorous exercise program: moderate  New Rx: None.  Consider increasing Wellbutrin or changing to Contrave pending course  Behavior change: Tracking intake.  Put snacks in car. Considering behavioral health eval  Follow-up: 1 mo    The patient was having a lot of family issues, stressors and was out of town for some time.  She tried to watch what she was eating, reduce carbohydrates and use protein shakes, but has some difficulty staying on track.  She is happy to see she has not gained weight since her last visit 8 months ago.  She would like to pursue bariatric surgery.    Has been having tinnitus, vertigo, saw an ENT but did not get an answer as to why she may be having the symptoms.  She decided to stop metformin, Wellbutrin as she read these could cause her symptoms so she was fearful to continue.    I FG: Not currently on metformin per her request  HLD: Not on statin or fenofibrate    Exercise:   Inconsistent use of Peloton, walking     Review of Systems   Recent URI, rhinitis, cough  All other ROS were reviewed and are otherwise unchanged from my previous visit with patient.    Physical Exam:    /86 (BP  "Location: Left arm, Patient Position: Sitting, BP Cuff Size: Large adult long)   Pulse 69   Ht 1.778 m (5' 10\")   Wt (!) 146.3 kg (322 lb 8 oz)   SpO2 92%   BMI 46.27 kg/m²   Waist Measurement   Waist: 51.75 inch/inches  Weight change since last visit: -1 lb (+3 lb total)  Waist Circum change since last visit: 0 in (0 in total)    Constitutional: Oriented to person, place, and time and well-developed, well-nourished, and in no distress.    Head: Normocephalic.   Musculoskeletal: Normal range of motion. No edema.   Neurological: Alert and oriented to person, place, and time. No muscle weakness.  Gait normal.   Skin: Warm and dry. Not diaphoretic.   Psychiatric: Mood, memory, affect and judgment normal.     Laboratory:   Recent labs reviewed.      Dietitian Assessment: None today    ASSESSMENT/PLAN:  Body mass index is 46.27 kg/m².    Obesity Stage ([place]): 1; Class 3    1. BMI 45.0-49.9, adult (CMS-HCC)- 45.7  REFERRAL TO BARIATRIC SURGERY   2. Binge eating     3. IFG (impaired fasting glucose)     4. Hypercholesterolemia       The patient is struggled with weight loss over the last year, as she has been a patient in this program.  Would benefit from anti-obesity medication to treat binge eating.  Will refer patient to bariatric surgery per her request.  Unfortunately, did not tolerate metformin which could help with weight and impaired fasting glucose.  Encourage patient to seek care for vertigo/tinnitus and see what under lying etiology for it may be.  This has impeded her exercise capacity and increased her stress level.    The patient and I have discussed at length and agree to the following recommendations, which are all addressing the above diagnoses:    Weight Goal: 3-5% wt loss each month (pt goal weight is 270 lb)  Diet: 2 G[employer] shakes per day  Physical Activity: Indoor biking  Risk level for moderate/vigorous exercise program: Low  New Rx: Patient declines anti-obesity medication, metformin, " Wellbutrin  Behavior change: Consider behavioral health counseling to reduce stress levels and binge eating behaviors  Follow-up: 1 month  Referred to bariatric surgery.    Patient's body mass index is 46.27 kg/m². Exercise and nutrition counseling were performed at this visit.

## 2019-10-10 DIAGNOSIS — E66.01 OBESITY, CLASS III, BMI 40-49.9 (MORBID OBESITY) (HCC): ICD-10-CM

## 2019-10-10 DIAGNOSIS — G47.33 OSA (OBSTRUCTIVE SLEEP APNEA): ICD-10-CM

## 2019-10-10 DIAGNOSIS — E78.00 HYPERCHOLESTEROLEMIA: ICD-10-CM

## 2019-10-10 DIAGNOSIS — R73.01 IFG (IMPAIRED FASTING GLUCOSE): ICD-10-CM

## 2019-10-10 DIAGNOSIS — F33.41 RECURRENT MAJOR DEPRESSIVE DISORDER, IN PARTIAL REMISSION (HCC): ICD-10-CM

## 2019-10-14 ENCOUNTER — PATIENT MESSAGE (OUTPATIENT)
Dept: MEDICAL GROUP | Facility: MEDICAL CENTER | Age: 54
End: 2019-10-14

## 2019-10-14 NOTE — PATIENT COMMUNICATION
1. Caller Name: Latia Almanza                                           Call Back Number: [phone] (home)        Patient approves a detailed voicemail message: yes    2. SPECIFIC Action To Be Taken: Referral pending, please sign.    3. Diagnosis/Clinical Reason for Request: Sleep Apena    4. Specialty & Provider Name/Lab/Imaging Location: Dr. Dye    5. Is appointment scheduled for requested order/referral: no    Patient was not informed they will receive a return phone call from the office ONLY if there are any questions before processing their request. Advised to call back if they haven't received a call from the referral department in 5 days.

## 2019-10-22 ENCOUNTER — HOSPITAL ENCOUNTER (OUTPATIENT)
Dept: LAB | Facility: MEDICAL CENTER | Age: 54
End: 2019-10-22
Attending: INTERNAL MEDICINE
Payer: COMMERCIAL

## 2019-10-22 ENCOUNTER — HOSPITAL ENCOUNTER (OUTPATIENT)
Dept: RADIOLOGY | Facility: MEDICAL CENTER | Age: 54
End: 2019-10-22
Attending: INTERNAL MEDICINE
Payer: COMMERCIAL

## 2019-10-22 DIAGNOSIS — R74.01 TRANSAMINITIS: ICD-10-CM

## 2019-10-22 DIAGNOSIS — E61.1 IRON DEFICIENCY: ICD-10-CM

## 2019-10-22 DIAGNOSIS — D72.819 LEUKOPENIA, UNSPECIFIED TYPE: ICD-10-CM

## 2019-10-22 DIAGNOSIS — E55.9 HYPOVITAMINOSIS D: ICD-10-CM

## 2019-10-22 DIAGNOSIS — M54.12 CERVICAL RADICULOPATHY: ICD-10-CM

## 2019-10-22 DIAGNOSIS — R73.01 IFG (IMPAIRED FASTING GLUCOSE): ICD-10-CM

## 2019-10-22 LAB
ALBUMIN SERPL BCP-MCNC: 4.3 G/DL (ref 3.2–4.9)
ALBUMIN/GLOB SERPL: 1.5 G/DL
ALP SERPL-CCNC: 91 U/L (ref 30–99)
ALT SERPL-CCNC: 39 U/L (ref 2–50)
ANION GAP SERPL CALC-SCNC: 8 MMOL/L (ref 0–11.9)
AST SERPL-CCNC: 27 U/L (ref 12–45)
BASOPHILS # BLD AUTO: 0.6 % (ref 0–1.8)
BASOPHILS # BLD: 0.03 K/UL (ref 0–0.12)
BILIRUB SERPL-MCNC: 0.4 MG/DL (ref 0.1–1.5)
BUN SERPL-MCNC: 18 MG/DL (ref 8–22)
CALCIUM SERPL-MCNC: 9 MG/DL (ref 8.5–10.5)
CHLORIDE SERPL-SCNC: 108 MMOL/L (ref 96–112)
CO2 SERPL-SCNC: 24 MMOL/L (ref 20–33)
CREAT SERPL-MCNC: 0.88 MG/DL (ref 0.5–1.4)
EOSINOPHIL # BLD AUTO: 0.16 K/UL (ref 0–0.51)
EOSINOPHIL NFR BLD: 3.4 % (ref 0–6.9)
ERYTHROCYTE [DISTWIDTH] IN BLOOD BY AUTOMATED COUNT: 44.3 FL (ref 35.9–50)
EST. AVERAGE GLUCOSE BLD GHB EST-MCNC: 131 MG/DL
FASTING STATUS PATIENT QL REPORTED: NORMAL
GLOBULIN SER CALC-MCNC: 2.8 G/DL (ref 1.9–3.5)
GLUCOSE SERPL-MCNC: 95 MG/DL (ref 65–99)
HBA1C MFR BLD: 6.2 % (ref 0–5.6)
HCT VFR BLD AUTO: 42.1 % (ref 37–47)
HGB BLD-MCNC: 13.4 G/DL (ref 12–16)
IMM GRANULOCYTES # BLD AUTO: 0 K/UL (ref 0–0.11)
IMM GRANULOCYTES NFR BLD AUTO: 0 % (ref 0–0.9)
IRON SATN MFR SERPL: 17 % (ref 15–55)
IRON SERPL-MCNC: 60 UG/DL (ref 40–170)
LYMPHOCYTES # BLD AUTO: 1.51 K/UL (ref 1–4.8)
LYMPHOCYTES NFR BLD: 32.3 % (ref 22–41)
MCH RBC QN AUTO: 28.6 PG (ref 27–33)
MCHC RBC AUTO-ENTMCNC: 31.8 G/DL (ref 33.6–35)
MCV RBC AUTO: 90 FL (ref 81.4–97.8)
MONOCYTES # BLD AUTO: 0.46 K/UL (ref 0–0.85)
MONOCYTES NFR BLD AUTO: 9.9 % (ref 0–13.4)
NEUTROPHILS # BLD AUTO: 2.51 K/UL (ref 2–7.15)
NEUTROPHILS NFR BLD: 53.8 % (ref 44–72)
NRBC # BLD AUTO: 0 K/UL
NRBC BLD-RTO: 0 /100 WBC
PLATELET # BLD AUTO: 343 K/UL (ref 164–446)
PMV BLD AUTO: 9.7 FL (ref 9–12.9)
POTASSIUM SERPL-SCNC: 4.1 MMOL/L (ref 3.6–5.5)
PROT SERPL-MCNC: 7.1 G/DL (ref 6–8.2)
RBC # BLD AUTO: 4.68 M/UL (ref 4.2–5.4)
SODIUM SERPL-SCNC: 140 MMOL/L (ref 135–145)
TIBC SERPL-MCNC: 361 UG/DL (ref 250–450)
WBC # BLD AUTO: 4.7 K/UL (ref 4.8–10.8)

## 2019-10-22 PROCEDURE — 85025 COMPLETE CBC W/AUTO DIFF WBC: CPT

## 2019-10-22 PROCEDURE — 83550 IRON BINDING TEST: CPT

## 2019-10-22 PROCEDURE — 83540 ASSAY OF IRON: CPT

## 2019-10-22 PROCEDURE — 72040 X-RAY EXAM NECK SPINE 2-3 VW: CPT

## 2019-10-22 PROCEDURE — 80053 COMPREHEN METABOLIC PANEL: CPT

## 2019-10-22 PROCEDURE — 36415 COLL VENOUS BLD VENIPUNCTURE: CPT

## 2019-10-22 PROCEDURE — 82306 VITAMIN D 25 HYDROXY: CPT

## 2019-10-22 PROCEDURE — 83036 HEMOGLOBIN GLYCOSYLATED A1C: CPT

## 2019-10-23 LAB — 25(OH)D3 SERPL-MCNC: 15 NG/ML (ref 30–100)

## 2019-10-24 ENCOUNTER — APPOINTMENT (OUTPATIENT)
Dept: MEDICAL GROUP | Facility: MEDICAL CENTER | Age: 54
End: 2019-10-24
Payer: COMMERCIAL

## 2019-10-31 ENCOUNTER — APPOINTMENT (OUTPATIENT)
Dept: HEALTH INFORMATION MANAGEMENT | Facility: MEDICAL CENTER | Age: 54
End: 2019-10-31
Payer: COMMERCIAL

## 2019-11-01 ENCOUNTER — OFFICE VISIT (OUTPATIENT)
Dept: MEDICAL GROUP | Facility: MEDICAL CENTER | Age: 54
End: 2019-11-01
Payer: COMMERCIAL

## 2019-11-01 VITALS
SYSTOLIC BLOOD PRESSURE: 130 MMHG | OXYGEN SATURATION: 96 % | WEIGHT: 293 LBS | BODY MASS INDEX: 41.95 KG/M2 | HEIGHT: 70 IN | HEART RATE: 72 BPM | DIASTOLIC BLOOD PRESSURE: 70 MMHG | TEMPERATURE: 98.7 F

## 2019-11-01 DIAGNOSIS — G47.33 OSA ON CPAP: ICD-10-CM

## 2019-11-01 DIAGNOSIS — E66.01 OBESITY, CLASS III, BMI 40-49.9 (MORBID OBESITY) (HCC): ICD-10-CM

## 2019-11-01 DIAGNOSIS — M54.12 CERVICAL RADICULOPATHY: ICD-10-CM

## 2019-11-01 DIAGNOSIS — H93.13 TINNITUS AURIUM, BILATERAL: ICD-10-CM

## 2019-11-01 DIAGNOSIS — M62.838 MUSCLE SPASM: ICD-10-CM

## 2019-11-01 DIAGNOSIS — R73.01 IFG (IMPAIRED FASTING GLUCOSE): ICD-10-CM

## 2019-11-01 DIAGNOSIS — E78.00 HYPERCHOLESTEROLEMIA: ICD-10-CM

## 2019-11-01 DIAGNOSIS — E55.9 HYPOVITAMINOSIS D: ICD-10-CM

## 2019-11-01 DIAGNOSIS — D72.819 LEUKOPENIA, UNSPECIFIED TYPE: ICD-10-CM

## 2019-11-01 PROCEDURE — 99214 OFFICE O/P EST MOD 30 MIN: CPT | Performed by: INTERNAL MEDICINE

## 2019-11-01 RX ORDER — ERGOCALCIFEROL 1.25 MG/1
50000 CAPSULE ORAL
Qty: 12 CAP | Refills: 1 | Status: ON HOLD
Start: 2019-11-01 | End: 2019-12-23

## 2019-11-01 RX ORDER — CYCLOBENZAPRINE HCL 5 MG
5-10 TABLET ORAL 3 TIMES DAILY PRN
Qty: 40 TAB | Refills: 1 | Status: ON HOLD
Start: 2019-11-01 | End: 2019-12-23

## 2019-11-01 NOTE — PROGRESS NOTES
CHIEF COMPLAINT  Chief Complaint   Patient presents with   • Follow-Up     6 weeks   IFG    HPI  Latia Almanza is a 54 y.o. female who presents today for the following     IFG  The patient had elevated FBG, and A1c at 6.0.  No polydipsia, polyphagia, polyuria.  No abdominal pain, weight loss, fatigue.  Diet: low maria e  Exercise: increased  On metformin 500 mg QD  BMI: 46  FH of DM: parents     Hypercholesterolemia, transaminitis  The patient has slightly elevated cholesterol, no medications.  Diet /Exercise/BMI: As above  FH: unknown  Labs showed slightly elevated ALT.     Hypovitaminosis D  The patient had low vitamin D level at 15.  Vitamin D supplement: no.    Leukopenia  The patient had borderline low WBC count, normal differential.  She has not have frequent infections, lymphadenopathy, anorexia, weight loss.     Cervical radiculopathy, muscle spasm  Complains of intermittent pain in the right shoulder, radiating to the arm.  Muscles are stiff.  She does not complain of neck pain.  No trauma, fever, chills, numbness or tingling.  Naproxen did not help.    Tinnitus bilateral, significant in the RT ear  Complaints for ringing in in both ears, mainly on the right side, accompanied with dizziness.  No recent respiratory infection, balance problem, headache, numbness, weakness, tingling.    JOSE RAMON on CPAP  The last sleep study was few yrs ago.   She has been compliant with CPAP, using every night.  Denies daytime sleepiness.    Obesity, BMI 47  No change  She was evaluated for bariatric surgery, pending insurance approval     Onset: after hysterectomy, at the age of 46 y/o  Diet: regular  Exercise: limited  No temperature intolerance. No change in hair/skin quality, BMs.   No HTN, buffalo hump, purple striae, flushing.  FH of obesity: mother     Reviewed PMH, PSH, FH, SH, ALL, HCM/IMM, no changes  Reviewed MEDS, no changes    Patient Active Problem List    Diagnosis Date Noted   • Transaminitis 01/29/2018      Priority: Medium   • Hypokalemia 01/29/2018     Priority: Medium   • Sleep apnea 04/20/2015     Priority: Medium   • Hypercholesterolemia 09/12/2019   • IFG (impaired fasting glucose) 09/12/2019   • Eosinophilic asthma (HCC) 09/12/2019   • Screening for malignant neoplasm of breast 02/20/2019   • Anxiety 01/23/2019   • Trigger finger of right thumb 10/17/2018   • Binge eating 09/25/2018   • Weight gain 09/25/2018   • Intermittent asthma without complication 03/26/2018   • Chronic seasonal allergic rhinitis due to pollen 03/26/2018   • Leukopenia 01/29/2018   • Influenza 01/29/2018   • Sarcoidosis 01/24/2018   • BMI 45.0-49.9, adult (CMS-HCC)- 45.7 11/24/2017   • Pre-operative cardiovascular examination 11/17/2017   • Recurrent major depressive disorder, in partial remission ([facility]) 03/03/2017   • Degeneration of lumbar intervertebral disc 10/27/2016   • Wheezing 01/20/2016   • Postmenopausal HRT (hormone replacement therapy) 01/20/2016   • H/O hysterectomy with oophorectomy 04/20/2015   • S/P hip replacement 04/20/2015     CURRENT MEDICATIONS  Current Outpatient Medications   Medication Sig Dispense Refill   • vitamin D, Ergocalciferol, (DRISDOL) 37355 units Cap capsule Take 1 Cap by mouth every 7 days. 12 Cap 1   • cyclobenzaprine (FLEXERIL) 5 MG tablet Take 1-2 Tabs by mouth 3 times a day as needed. 40 Tab 1   • dupilumab (DUPIXENT) 300 MG/2ML Solution Prefilled Syringe injection Inject  as instructed Once.     • estradiol (VIVELLE DOT) 0.05 MG/24HR PATCH BIWEEKLY APPLY 1 PATCH TOPICALLY TO CLEAN, DRY SKIN AS DIRECTED 2 TIMES A WEEK. REMOVE OLD PATCH BEFORE APPLYING NEW PATCH 24 Patch 3   • ALBUTEROL INH Inhale  by mouth.     • estradiol (VIVELLE DOT) 0.05 MG/24HR PATCH BIWEEKLY APPLY 1 PATCH TOPICALLY TO CLEAN, DRY SKIN AS DIRECTED 2 TIMES A WEEK. REMOVE OLD PATCH BEFORE APPLYING NEW PATCH 8 Patch 0   • metFORMIN (GLUCOPHAGE) 500 MG Tab Take 1 Tab by mouth every day. (Patient not taking: Reported on 9/12/2019) 90  Tab 1   • QVAR REDIHALER 80 MCG/ACT inhaler      • pantoprazole (PROTONIX) 40 MG Tablet Delayed Response      • fluticasone-salmeterol (ADVAIR DISKUS) 250-50 MCG/DOSE AEROSOL POWDER, BREATH ACTIVATED Advair Diskus 250 mcg-50 mcg/dose powder for inhalation     • SPIRIVA RESPIMAT 1.25 MCG/ACT Aero Soln        No current facility-administered medications for this visit.      ALLERGIES  Allergies: Patient has no known allergies.  PAST MEDICAL HISTORY  Past Medical History:   Diagnosis Date   • Bronchitis    • Pneumonia 2017   • Allergic rhinitis    • Anxiety    • Asthma     daily and as needed inhalers   • Back pain     back/ right hip   • Dental disorder     upper implants   • Fatty liver     per pt hx   • Heart burn    • Heart murmur     functional murmur   • Obesity    • Restless leg syndrome    • Sarcoidosis    • Sciatica    • Sleep apnea     CPAP   • Snoring      SURGICAL HISTORY  She  has a past surgical history that includes abdominal hysterectomy total (); hip arthroplasty total (Right, ); laparotomy (); sinuscope (); other (); other orthopedic surgery (Right, ); lumbar fusion anterior (10/27/2016); gastroscopy (N/A, 2018); trigger finger release (Right, 10/17/2018); and cyst excision (10/17/2018).  SOCIAL HISTORY  Social History     Tobacco Use   • Smoking status: Former Smoker     Packs/day: 0.25     Years: 5.00     Pack years: 1.25     Types: Cigarettes     Last attempt to quit: 2001     Years since quittin.7   • Smokeless tobacco: Never Used   Substance Use Topics   • Alcohol use: Yes     Comment: occasionally   • Drug use: No     Social History     Social History Narrative   • Not on file     FAMILY HISTORY  Family History   Problem Relation Age of Onset   • Lung Disease Mother         COPD, asthma   • Diabetes Mother    • Diabetes Father    • Heart Disease Father      Family Status   Relation Name Status   • Mo     • Fa  Alive     ROS   Constitutional:  "Negative for fever, chills, fatigue.  HENT: Negative for congestion, sore throat.  As above.  Eyes: Negative for vision problems.   Respiratory: Negative for cough, shortness of breath.  Cardiovascular: Negative for chest pain, palpitations.   Gastrointestinal: Negative for heartburn, nausea, abdominal pain.   Genitourinary: Negative for dysuria.  Musculoskeletal: As above.   Skin: Negative for rash.   Neuro: Negative for dizziness, weakness and headaches.   Endo/Heme/Allergies: Does not bruise/bleed easily.   Psychiatric/Behavioral: Negative for depression.    PHYSICAL EXAM   Blood Pressure 130/70   Pulse 72   Temperature 37.1 °C (98.7 °F) (Temporal)   Height 1.778 m (5' 10\")   Weight (Abnormal) 150 kg (330 lb 11 oz)   Oxygen Saturation 96%  Body mass index is 47.45 kg/m².  General:  NAD, well appearing  HEENT:   NC/AT, PERRLA, EOMI, TMs are clear. Oropharyngeal mucosa is pink,  without lesions;  no cervical / supraclavicular  lymphadenopathy, no thyromegaly.    Cardiovascular: RRR.   No m/r/g.       Lungs:   CTAB, no w/r/r, no respiratory distress.  Abdomen: Soft, NT/ND; unable to palpate liver/spleen due to WT.  Extremities:  2+ DP and radial pulses bilaterally.  No c/c/e.   Skin:  Warm, dry.  No erythema. No rash.   Neurologic: Alert & oriented x 3. CN II-XII grossly intact. No focal deficits.  Psychiatric:  Affect normal, mood normal, judgment normal.    Labs     Labs are reviewed and discussed with a patient  Lab Results   Component Value Date/Time    CHOLSTRLTOT 185 05/08/2018 02:19 PM     (H) 05/08/2018 02:19 PM    HDL 68 05/08/2018 02:19 PM    TRIGLYCERIDE 70 05/08/2018 02:19 PM       Lab Results   Component Value Date/Time    SODIUM 140 10/22/2019 01:38 PM    POTASSIUM 4.1 10/22/2019 01:38 PM    CHLORIDE 108 10/22/2019 01:38 PM    CO2 24 10/22/2019 01:38 PM    GLUCOSE 95 10/22/2019 01:38 PM    BUN 18 10/22/2019 01:38 PM    CREATININE 0.88 10/22/2019 01:38 PM     Lab Results   Component Value " Date/Time    ALKPHOSPHAT 91 10/22/2019 01:38 PM    ASTSGOT 27 10/22/2019 01:38 PM    ALTSGPT 39 10/22/2019 01:38 PM    TBILIRUBIN 0.4 10/22/2019 01:38 PM      Lab Results   Component Value Date/Time    HBA1C 6.2 (H) 10/22/2019 01:38 PM    HBA1C 6.0 (H) 02/12/2019 09:25 AM    HBA1C 6.2 (H) 05/08/2018 02:19 PM     Lab Results   Component Value Date/Time    WBC 4.7 (L) 10/22/2019 01:38 PM    RBC 4.68 10/22/2019 01:38 PM    HEMOGLOBIN 13.4 10/22/2019 01:38 PM    HEMATOCRIT 42.1 10/22/2019 01:38 PM    MCV 90.0 10/22/2019 01:38 PM    MCH 28.6 10/22/2019 01:38 PM    MCHC 31.8 (L) 10/22/2019 01:38 PM    MPV 9.7 10/22/2019 01:38 PM    NEUTSPOLYS 53.80 10/22/2019 01:38 PM    LYMPHOCYTES 32.30 10/22/2019 01:38 PM    MONOCYTES 9.90 10/22/2019 01:38 PM    EOSINOPHILS 3.40 10/22/2019 01:38 PM    BASOPHILS 0.60 10/22/2019 01:38 PM      Imaging     None    Assessment and Plan     Latia Almanza is a 54 y.o. female    1. IFG (impaired fasting glucose)  Discussed about risk to develop DM.   Advised low carb diet, exercise, watch for WT.   - Comp Metabolic Panel; Future  - HEMOGLOBIN A1C; Future    2. Hypercholesterolemia  Advised as above  - Comp Metabolic Panel; Future  - Lipid Profile; Future    3. Hypovitaminosis D  Start high-dose vitamin D  - VITAMIN D,25 HYDROXY; Future  - vitamin D, Ergocalciferol, (DRISDOL) 26603 units Cap capsule; Take 1 Cap by mouth every 7 days.  Dispense: 12 Cap; Refill: 1    4. Leukopenia, unspecified type  Borderline, follow-up labs  - CBC WITH DIFFERENTIAL; Future    5. Cervical radiculopathy  Responded well to Flexeril, requested refill.  Advised to continue activity as tolerated.  - cyclobenzaprine (FLEXERIL) 5 MG tablet; Take 1-2 Tabs by mouth 3 times a day as needed.  Dispense: 40 Tab; Refill: 1  6. Muscle spasm  - cyclobenzaprine (FLEXERIL) 5 MG tablet; Take 1-2 Tabs by mouth 3 times a day as needed.  Dispense: 40 Tab; Refill: 1    7. Tinnitus aurium, bilateral  She was evaluated by ENT,  requested second opinion  - REFERRAL TO ENT    8. JOSE RAMON on CPAP  Need pulmonology follow-up  - REFERRAL TO PULMONOLOGY    9. Obesity, Class III, BMI 40-49.9 (morbid obesity) (HCC)  Pending approval for bariatric surgery by insurance    Counseling:   - Smoking:  Nonsmoker    Followup: Return in about 4 months (around 3/1/2020) for Labs.    All questions are answered.    Please note that this dictation was created using voice recognition software, and that there might be errors of earl and possibly content.

## 2019-12-19 ENCOUNTER — HOSPITAL ENCOUNTER (OUTPATIENT)
Dept: RADIOLOGY | Facility: MEDICAL CENTER | Age: 54
DRG: 621 | End: 2019-12-19
Attending: SURGERY | Admitting: SURGERY
Payer: COMMERCIAL

## 2019-12-19 DIAGNOSIS — Z01.812 PRE-OPERATIVE LABORATORY EXAMINATION: ICD-10-CM

## 2019-12-19 DIAGNOSIS — Z01.811 PRE-OPERATIVE RESPIRATORY EXAMINATION: ICD-10-CM

## 2019-12-19 DIAGNOSIS — Z01.810 PRE-OPERATIVE CARDIOVASCULAR EXAMINATION: ICD-10-CM

## 2019-12-19 LAB
25(OH)D3 SERPL-MCNC: 34 NG/ML (ref 30–100)
ALBUMIN SERPL BCP-MCNC: 4.5 G/DL (ref 3.2–4.9)
ALBUMIN/GLOB SERPL: 1.5 G/DL
ALP SERPL-CCNC: 84 U/L (ref 30–99)
ALT SERPL-CCNC: 22 U/L (ref 2–50)
ANION GAP SERPL CALC-SCNC: 10 MMOL/L (ref 0–11.9)
AST SERPL-CCNC: 20 U/L (ref 12–45)
BASOPHILS # BLD AUTO: 0.9 % (ref 0–1.8)
BASOPHILS # BLD: 0.05 K/UL (ref 0–0.12)
BILIRUB SERPL-MCNC: 0.5 MG/DL (ref 0.1–1.5)
BUN SERPL-MCNC: 17 MG/DL (ref 8–22)
CALCIUM SERPL-MCNC: 9.6 MG/DL (ref 8.5–10.5)
CHLORIDE SERPL-SCNC: 105 MMOL/L (ref 96–112)
CHOLEST SERPL-MCNC: 190 MG/DL (ref 100–199)
CO2 SERPL-SCNC: 21 MMOL/L (ref 20–33)
CREAT SERPL-MCNC: 0.85 MG/DL (ref 0.5–1.4)
EKG IMPRESSION: NORMAL
EOSINOPHIL # BLD AUTO: 0.17 K/UL (ref 0–0.51)
EOSINOPHIL NFR BLD: 3.1 % (ref 0–6.9)
ERYTHROCYTE [DISTWIDTH] IN BLOOD BY AUTOMATED COUNT: 47.6 FL (ref 35.9–50)
FERRITIN SERPL-MCNC: 46.9 NG/ML (ref 10–291)
FOLATE SERPL-MCNC: 12.7 NG/ML
GLOBULIN SER CALC-MCNC: 3 G/DL (ref 1.9–3.5)
GLUCOSE SERPL-MCNC: 97 MG/DL (ref 65–99)
HCT VFR BLD AUTO: 41.3 % (ref 37–47)
HDLC SERPL-MCNC: 64 MG/DL
HGB BLD-MCNC: 13.3 G/DL (ref 12–16)
IMM GRANULOCYTES # BLD AUTO: 0.02 K/UL (ref 0–0.11)
IMM GRANULOCYTES NFR BLD AUTO: 0.4 % (ref 0–0.9)
IRON SATN MFR SERPL: 18 % (ref 15–55)
IRON SERPL-MCNC: 70 UG/DL (ref 40–170)
LDLC SERPL CALC-MCNC: 108 MG/DL
LYMPHOCYTES # BLD AUTO: 1.61 K/UL (ref 1–4.8)
LYMPHOCYTES NFR BLD: 29.7 % (ref 22–41)
MCH RBC QN AUTO: 28.9 PG (ref 27–33)
MCHC RBC AUTO-ENTMCNC: 32.2 G/DL (ref 33.6–35)
MCV RBC AUTO: 89.6 FL (ref 81.4–97.8)
MONOCYTES # BLD AUTO: 0.41 K/UL (ref 0–0.85)
MONOCYTES NFR BLD AUTO: 7.6 % (ref 0–13.4)
NEUTROPHILS # BLD AUTO: 3.16 K/UL (ref 2–7.15)
NEUTROPHILS NFR BLD: 58.3 % (ref 44–72)
NRBC # BLD AUTO: 0 K/UL
NRBC BLD-RTO: 0 /100 WBC
PLATELET # BLD AUTO: 393 K/UL (ref 164–446)
PMV BLD AUTO: 9.6 FL (ref 9–12.9)
POTASSIUM SERPL-SCNC: 4 MMOL/L (ref 3.6–5.5)
PREALB SERPL-MCNC: 16 MG/DL (ref 18–38)
PROT SERPL-MCNC: 7.5 G/DL (ref 6–8.2)
PTH-INTACT SERPL-MCNC: 124.5 PG/ML (ref 14–72)
RBC # BLD AUTO: 4.61 M/UL (ref 4.2–5.4)
SODIUM SERPL-SCNC: 136 MMOL/L (ref 135–145)
TIBC SERPL-MCNC: 389 UG/DL (ref 250–450)
TRIGL SERPL-MCNC: 92 MG/DL (ref 0–149)
VIT B12 SERPL-MCNC: 408 PG/ML (ref 211–911)
WBC # BLD AUTO: 5.4 K/UL (ref 4.8–10.8)

## 2019-12-19 PROCEDURE — 36415 COLL VENOUS BLD VENIPUNCTURE: CPT

## 2019-12-19 PROCEDURE — 93005 ELECTROCARDIOGRAM TRACING: CPT

## 2019-12-19 PROCEDURE — 82306 VITAMIN D 25 HYDROXY: CPT

## 2019-12-19 PROCEDURE — 80061 LIPID PANEL: CPT

## 2019-12-19 PROCEDURE — 82746 ASSAY OF FOLIC ACID SERUM: CPT

## 2019-12-19 PROCEDURE — 84425 ASSAY OF VITAMIN B-1: CPT

## 2019-12-19 PROCEDURE — 80053 COMPREHEN METABOLIC PANEL: CPT

## 2019-12-19 PROCEDURE — 83970 ASSAY OF PARATHORMONE: CPT

## 2019-12-19 PROCEDURE — 82728 ASSAY OF FERRITIN: CPT

## 2019-12-19 PROCEDURE — 93010 ELECTROCARDIOGRAM REPORT: CPT | Performed by: INTERNAL MEDICINE

## 2019-12-19 PROCEDURE — 83540 ASSAY OF IRON: CPT

## 2019-12-19 PROCEDURE — 85025 COMPLETE CBC W/AUTO DIFF WBC: CPT

## 2019-12-19 PROCEDURE — 84134 ASSAY OF PREALBUMIN: CPT

## 2019-12-19 PROCEDURE — 71046 X-RAY EXAM CHEST 2 VIEWS: CPT

## 2019-12-19 PROCEDURE — 82607 VITAMIN B-12: CPT

## 2019-12-19 PROCEDURE — 83550 IRON BINDING TEST: CPT

## 2019-12-19 RX ORDER — NEOMYCIN/POLYMYXIN B/PRAMOXINE 3.5-10K-1
1 CREAM (GRAM) TOPICAL DAILY
COMMUNITY

## 2019-12-19 SDOH — HEALTH STABILITY: MENTAL HEALTH: HOW OFTEN DO YOU HAVE A DRINK CONTAINING ALCOHOL?: 2-3 TIMES A WEEK

## 2019-12-19 SDOH — HEALTH STABILITY: MENTAL HEALTH: HOW MANY STANDARD DRINKS CONTAINING ALCOHOL DO YOU HAVE ON A TYPICAL DAY?: 1 OR 2

## 2019-12-22 RX ORDER — ACETAMINOPHEN 10 MG/ML
1 INJECTION, SOLUTION INTRAVENOUS ONCE
Status: COMPLETED | OUTPATIENT
Start: 2019-12-23 | End: 2019-12-23

## 2019-12-23 ENCOUNTER — ANESTHESIA (OUTPATIENT)
Dept: SURGERY | Facility: MEDICAL CENTER | Age: 54
DRG: 621 | End: 2019-12-23
Payer: COMMERCIAL

## 2019-12-23 ENCOUNTER — ANESTHESIA EVENT (OUTPATIENT)
Dept: SURGERY | Facility: MEDICAL CENTER | Age: 54
DRG: 621 | End: 2019-12-23
Payer: COMMERCIAL

## 2019-12-23 ENCOUNTER — HOSPITAL ENCOUNTER (INPATIENT)
Facility: MEDICAL CENTER | Age: 54
LOS: 1 days | DRG: 621 | End: 2019-12-24
Attending: SURGERY | Admitting: SURGERY
Payer: COMMERCIAL

## 2019-12-23 DIAGNOSIS — G89.18 POSTOPERATIVE PAIN: ICD-10-CM

## 2019-12-23 LAB — VIT B1 BLD-MCNC: 130 NMOL/L (ref 70–180)

## 2019-12-23 PROCEDURE — 502240 HCHG MISC OR SUPPLY RC 0272: Performed by: SURGERY

## 2019-12-23 PROCEDURE — 700111 HCHG RX REV CODE 636 W/ 250 OVERRIDE (IP): Performed by: SURGERY

## 2019-12-23 PROCEDURE — 501664 HCHG TUBING, FILTER STRYKER: Performed by: SURGERY

## 2019-12-23 PROCEDURE — 501583 HCHG TROCAR, THRD CAN&SEAL 5X100: Performed by: SURGERY

## 2019-12-23 PROCEDURE — A6402 STERILE GAUZE <= 16 SQ IN: HCPCS | Performed by: SURGERY

## 2019-12-23 PROCEDURE — 160048 HCHG OR STATISTICAL LEVEL 1-5: Performed by: SURGERY

## 2019-12-23 PROCEDURE — 700102 HCHG RX REV CODE 250 W/ 637 OVERRIDE(OP): Performed by: SURGERY

## 2019-12-23 PROCEDURE — 700105 HCHG RX REV CODE 258: Performed by: SURGERY

## 2019-12-23 PROCEDURE — 700111 HCHG RX REV CODE 636 W/ 250 OVERRIDE (IP): Performed by: ANESTHESIOLOGY

## 2019-12-23 PROCEDURE — 700101 HCHG RX REV CODE 250: Performed by: SURGERY

## 2019-12-23 PROCEDURE — 160041 HCHG SURGERY MINUTES - EA ADDL 1 MIN LEVEL 4: Performed by: SURGERY

## 2019-12-23 PROCEDURE — A9270 NON-COVERED ITEM OR SERVICE: HCPCS | Performed by: SURGERY

## 2019-12-23 PROCEDURE — 160035 HCHG PACU - 1ST 60 MINS PHASE I: Performed by: SURGERY

## 2019-12-23 PROCEDURE — 500800 HCHG LAPAROSCOPIC J/L HOOK: Performed by: SURGERY

## 2019-12-23 PROCEDURE — 501571 HCHG TROCAR, SEPARATOR 12X100: Performed by: SURGERY

## 2019-12-23 PROCEDURE — 770006 HCHG ROOM/CARE - MED/SURG/GYN SEMI*

## 2019-12-23 PROCEDURE — 160002 HCHG RECOVERY MINUTES (STAT): Performed by: SURGERY

## 2019-12-23 PROCEDURE — 501570 HCHG TROCAR, SEPARATOR: Performed by: SURGERY

## 2019-12-23 PROCEDURE — 700111 HCHG RX REV CODE 636 W/ 250 OVERRIDE (IP)

## 2019-12-23 PROCEDURE — 501576 HCHG TROCAR, SMTH CAN&SEAL12: Performed by: SURGERY

## 2019-12-23 PROCEDURE — 160029 HCHG SURGERY MINUTES - 1ST 30 MINS LEVEL 4: Performed by: SURGERY

## 2019-12-23 PROCEDURE — 88305 TISSUE EXAM BY PATHOLOGIST: CPT

## 2019-12-23 PROCEDURE — 160009 HCHG ANES TIME/MIN: Performed by: SURGERY

## 2019-12-23 PROCEDURE — 500868 HCHG NEEDLE, SURGI(VARES): Performed by: SURGERY

## 2019-12-23 PROCEDURE — 700101 HCHG RX REV CODE 250: Performed by: ANESTHESIOLOGY

## 2019-12-23 PROCEDURE — 0DB64Z3 EXCISION OF STOMACH, PERCUTANEOUS ENDOSCOPIC APPROACH, VERTICAL: ICD-10-PCS | Performed by: SURGERY

## 2019-12-23 PROCEDURE — 502570 HCHG PACK, GASTRIC BANDING: Performed by: SURGERY

## 2019-12-23 PROCEDURE — 501838 HCHG SUTURE GENERAL: Performed by: SURGERY

## 2019-12-23 RX ORDER — DIPHENHYDRAMINE HYDROCHLORIDE 50 MG/ML
12.5 INJECTION INTRAMUSCULAR; INTRAVENOUS EVERY 6 HOURS PRN
Status: DISCONTINUED | OUTPATIENT
Start: 2019-12-23 | End: 2019-12-24 | Stop reason: HOSPADM

## 2019-12-23 RX ORDER — HALOPERIDOL 5 MG/ML
1 INJECTION INTRAMUSCULAR
Status: COMPLETED | OUTPATIENT
Start: 2019-12-23 | End: 2019-12-23

## 2019-12-23 RX ORDER — ESTRADIOL 0.05 MG/D
1 PATCH, EXTENDED RELEASE TRANSDERMAL
COMMUNITY

## 2019-12-23 RX ORDER — DIPHENHYDRAMINE HYDROCHLORIDE 50 MG/ML
12.5 INJECTION INTRAMUSCULAR; INTRAVENOUS
Status: COMPLETED | OUTPATIENT
Start: 2019-12-23 | End: 2019-12-23

## 2019-12-23 RX ORDER — BUPIVACAINE HYDROCHLORIDE AND EPINEPHRINE 5; 5 MG/ML; UG/ML
INJECTION, SOLUTION EPIDURAL; INTRACAUDAL; PERINEURAL
Status: DISCONTINUED | OUTPATIENT
Start: 2019-12-23 | End: 2019-12-23 | Stop reason: HOSPADM

## 2019-12-23 RX ORDER — MORPHINE SULFATE 4 MG/ML
1 INJECTION, SOLUTION INTRAMUSCULAR; INTRAVENOUS
Status: DISCONTINUED | OUTPATIENT
Start: 2019-12-23 | End: 2019-12-23 | Stop reason: HOSPADM

## 2019-12-23 RX ORDER — SCOLOPAMINE TRANSDERMAL SYSTEM 1 MG/1
1 PATCH, EXTENDED RELEASE TRANSDERMAL
Status: DISCONTINUED | OUTPATIENT
Start: 2019-12-23 | End: 2019-12-24 | Stop reason: HOSPADM

## 2019-12-23 RX ORDER — ONDANSETRON 2 MG/ML
4 INJECTION INTRAMUSCULAR; INTRAVENOUS EVERY 4 HOURS PRN
Status: DISCONTINUED | OUTPATIENT
Start: 2019-12-23 | End: 2019-12-24 | Stop reason: HOSPADM

## 2019-12-23 RX ORDER — DEXMEDETOMIDINE HYDROCHLORIDE 100 UG/ML
INJECTION, SOLUTION INTRAVENOUS PRN
Status: DISCONTINUED | OUTPATIENT
Start: 2019-12-23 | End: 2019-12-23 | Stop reason: SURG

## 2019-12-23 RX ORDER — MAGNESIUM SULFATE HEPTAHYDRATE 40 MG/ML
INJECTION, SOLUTION INTRAVENOUS PRN
Status: DISCONTINUED | OUTPATIENT
Start: 2019-12-23 | End: 2019-12-23 | Stop reason: SURG

## 2019-12-23 RX ORDER — SODIUM CHLORIDE, SODIUM LACTATE, POTASSIUM CHLORIDE, CALCIUM CHLORIDE 600; 310; 30; 20 MG/100ML; MG/100ML; MG/100ML; MG/100ML
INJECTION, SOLUTION INTRAVENOUS CONTINUOUS
Status: DISPENSED | OUTPATIENT
Start: 2019-12-23 | End: 2019-12-24

## 2019-12-23 RX ORDER — ACETAMINOPHEN 10 MG/ML
1000 INJECTION, SOLUTION INTRAVENOUS EVERY 6 HOURS
Status: DISPENSED | OUTPATIENT
Start: 2019-12-23 | End: 2019-12-24

## 2019-12-23 RX ORDER — ONDANSETRON 2 MG/ML
INJECTION INTRAMUSCULAR; INTRAVENOUS
Status: COMPLETED
Start: 2019-12-23 | End: 2019-12-23

## 2019-12-23 RX ORDER — SODIUM CHLORIDE, SODIUM LACTATE, POTASSIUM CHLORIDE, AND CALCIUM CHLORIDE .6; .31; .03; .02 G/100ML; G/100ML; G/100ML; G/100ML
500 INJECTION, SOLUTION INTRAVENOUS
Status: DISCONTINUED | OUTPATIENT
Start: 2019-12-23 | End: 2019-12-24 | Stop reason: HOSPADM

## 2019-12-23 RX ORDER — ESTRADIOL 0.05 MG/D
1 PATCH TRANSDERMAL SEE ADMIN INSTRUCTIONS
Status: ON HOLD | COMMUNITY
End: 2019-12-23

## 2019-12-23 RX ORDER — OXYCODONE HCL 5 MG/5 ML
5 SOLUTION, ORAL ORAL
Status: DISCONTINUED | OUTPATIENT
Start: 2019-12-23 | End: 2019-12-24 | Stop reason: HOSPADM

## 2019-12-23 RX ORDER — PROMETHAZINE HYDROCHLORIDE 25 MG/1
25 SUPPOSITORY RECTAL EVERY 4 HOURS PRN
Status: DISCONTINUED | OUTPATIENT
Start: 2019-12-23 | End: 2019-12-24 | Stop reason: HOSPADM

## 2019-12-23 RX ORDER — CYCLOBENZAPRINE HCL 5 MG
5-10 TABLET ORAL 3 TIMES DAILY PRN
COMMUNITY
End: 2020-01-22

## 2019-12-23 RX ORDER — ACETAMINOPHEN 10 MG/ML
INJECTION, SOLUTION INTRAVENOUS PRN
Status: DISCONTINUED | OUTPATIENT
Start: 2019-12-23 | End: 2019-12-23 | Stop reason: SURG

## 2019-12-23 RX ORDER — MORPHINE SULFATE 10 MG/ML
5 INJECTION, SOLUTION INTRAMUSCULAR; INTRAVENOUS
Status: DISCONTINUED | OUTPATIENT
Start: 2019-12-23 | End: 2019-12-23 | Stop reason: HOSPADM

## 2019-12-23 RX ORDER — HALOPERIDOL 5 MG/ML
1 INJECTION INTRAMUSCULAR EVERY 6 HOURS PRN
Status: DISCONTINUED | OUTPATIENT
Start: 2019-12-23 | End: 2019-12-24 | Stop reason: HOSPADM

## 2019-12-23 RX ORDER — LIDOCAINE HYDROCHLORIDE 10 MG/ML
INJECTION, SOLUTION EPIDURAL; INFILTRATION; INTRACAUDAL; PERINEURAL
Status: DISPENSED
Start: 2019-12-23 | End: 2019-12-24

## 2019-12-23 RX ORDER — ACETAMINOPHEN 500 MG
1000 TABLET ORAL EVERY 6 HOURS
Status: DISCONTINUED | OUTPATIENT
Start: 2019-12-24 | End: 2019-12-24 | Stop reason: HOSPADM

## 2019-12-23 RX ORDER — ONDANSETRON 2 MG/ML
4 INJECTION INTRAMUSCULAR; INTRAVENOUS
Status: COMPLETED | OUTPATIENT
Start: 2019-12-23 | End: 2019-12-23

## 2019-12-23 RX ORDER — HYDROMORPHONE HYDROCHLORIDE 1 MG/ML
0.5 INJECTION, SOLUTION INTRAMUSCULAR; INTRAVENOUS; SUBCUTANEOUS
Status: DISCONTINUED | OUTPATIENT
Start: 2019-12-23 | End: 2019-12-24 | Stop reason: HOSPADM

## 2019-12-23 RX ORDER — ERGOCALCIFEROL 1.25 MG/1
50000 CAPSULE ORAL
COMMUNITY
End: 2020-01-22

## 2019-12-23 RX ORDER — ONDANSETRON 2 MG/ML
INJECTION INTRAMUSCULAR; INTRAVENOUS PRN
Status: DISCONTINUED | OUTPATIENT
Start: 2019-12-23 | End: 2019-12-23 | Stop reason: SURG

## 2019-12-23 RX ORDER — METOCLOPRAMIDE HYDROCHLORIDE 5 MG/ML
INJECTION INTRAMUSCULAR; INTRAVENOUS PRN
Status: DISCONTINUED | OUTPATIENT
Start: 2019-12-23 | End: 2019-12-23 | Stop reason: SURG

## 2019-12-23 RX ORDER — HALOPERIDOL 5 MG/ML
INJECTION INTRAMUSCULAR
Status: COMPLETED
Start: 2019-12-23 | End: 2019-12-23

## 2019-12-23 RX ORDER — ALBUTEROL SULFATE 90 UG/1
2 AEROSOL, METERED RESPIRATORY (INHALATION) EVERY 6 HOURS PRN
COMMUNITY
End: 2020-01-22

## 2019-12-23 RX ORDER — CEFOTETAN DISODIUM 2 G/20ML
INJECTION, POWDER, FOR SOLUTION INTRAMUSCULAR; INTRAVENOUS PRN
Status: DISCONTINUED | OUTPATIENT
Start: 2019-12-23 | End: 2019-12-23 | Stop reason: SURG

## 2019-12-23 RX ORDER — MORPHINE SULFATE 4 MG/ML
2 INJECTION, SOLUTION INTRAMUSCULAR; INTRAVENOUS
Status: DISCONTINUED | OUTPATIENT
Start: 2019-12-23 | End: 2019-12-23 | Stop reason: HOSPADM

## 2019-12-23 RX ORDER — OXYCODONE HCL 5 MG/5 ML
10 SOLUTION, ORAL ORAL
Status: DISCONTINUED | OUTPATIENT
Start: 2019-12-23 | End: 2019-12-24 | Stop reason: HOSPADM

## 2019-12-23 RX ORDER — KETOROLAC TROMETHAMINE 30 MG/ML
30 INJECTION, SOLUTION INTRAMUSCULAR; INTRAVENOUS EVERY 6 HOURS
Status: DISCONTINUED | OUTPATIENT
Start: 2019-12-24 | End: 2019-12-24 | Stop reason: HOSPADM

## 2019-12-23 RX ADMIN — DIPHENHYDRAMINE HYDROCHLORIDE 12.5 MG: 50 INJECTION, SOLUTION INTRAMUSCULAR; INTRAVENOUS at 19:07

## 2019-12-23 RX ADMIN — MORPHINE SULFATE 1 MG: 4 INJECTION INTRAVENOUS at 19:03

## 2019-12-23 RX ADMIN — PROPOFOL 300 MG: 10 INJECTION, EMULSION INTRAVENOUS at 16:36

## 2019-12-23 RX ADMIN — METOCLOPRAMIDE 10 MG: 5 INJECTION, SOLUTION INTRAMUSCULAR; INTRAVENOUS at 17:38

## 2019-12-23 RX ADMIN — OXYCODONE HYDROCHLORIDE 5 MG: 5 SOLUTION ORAL at 20:52

## 2019-12-23 RX ADMIN — SODIUM CHLORIDE, POTASSIUM CHLORIDE, SODIUM LACTATE AND CALCIUM CHLORIDE: 600; 310; 30; 20 INJECTION, SOLUTION INTRAVENOUS at 17:40

## 2019-12-23 RX ADMIN — DIPHENHYDRAMINE HYDROCHLORIDE 12.5 MG: 50 INJECTION, SOLUTION INTRAMUSCULAR; INTRAVENOUS at 19:29

## 2019-12-23 RX ADMIN — HALOPERIDOL LACTATE 1 MG: 5 INJECTION, SOLUTION INTRAMUSCULAR at 18:29

## 2019-12-23 RX ADMIN — SODIUM CHLORIDE, POTASSIUM CHLORIDE, SODIUM LACTATE AND CALCIUM CHLORIDE: 600; 310; 30; 20 INJECTION, SOLUTION INTRAVENOUS at 15:43

## 2019-12-23 RX ADMIN — MORPHINE SULFATE 1 MG: 4 INJECTION INTRAVENOUS at 18:50

## 2019-12-23 RX ADMIN — HALOPERIDOL LACTATE 1 MG: 5 INJECTION, SOLUTION INTRAMUSCULAR at 18:05

## 2019-12-23 RX ADMIN — LIDOCAINE HYDROCHLORIDE 100 MG: 20 INJECTION, SOLUTION INTRAVENOUS at 16:36

## 2019-12-23 RX ADMIN — FENTANYL CITRATE 50 MCG: 50 INJECTION, SOLUTION INTRAMUSCULAR; INTRAVENOUS at 18:09

## 2019-12-23 RX ADMIN — ACETAMINOPHEN 1 G: 10 INJECTION, SOLUTION INTRAVENOUS at 17:21

## 2019-12-23 RX ADMIN — SODIUM CHLORIDE, POTASSIUM CHLORIDE, SODIUM LACTATE AND CALCIUM CHLORIDE: 600; 310; 30; 20 INJECTION, SOLUTION INTRAVENOUS at 16:28

## 2019-12-23 RX ADMIN — ROCURONIUM BROMIDE 50 MG: 10 INJECTION, SOLUTION INTRAVENOUS at 16:36

## 2019-12-23 RX ADMIN — ONDANSETRON 4 MG: 2 INJECTION INTRAMUSCULAR; INTRAVENOUS at 17:38

## 2019-12-23 RX ADMIN — SUGAMMADEX 200 MG: 100 INJECTION, SOLUTION INTRAVENOUS at 17:40

## 2019-12-23 RX ADMIN — ACETAMINOPHEN 1 G: 10 INJECTION, SOLUTION INTRAVENOUS at 15:43

## 2019-12-23 RX ADMIN — DEXMEDETOMIDINE HYDROCHLORIDE 100 MCG: 100 INJECTION, SOLUTION INTRAVENOUS at 16:42

## 2019-12-23 RX ADMIN — MORPHINE SULFATE 2 MG: 4 INJECTION INTRAVENOUS at 19:27

## 2019-12-23 RX ADMIN — FENTANYL CITRATE 50 MCG: 0.05 INJECTION, SOLUTION INTRAMUSCULAR; INTRAVENOUS at 18:09

## 2019-12-23 RX ADMIN — MIDAZOLAM HYDROCHLORIDE 2 MG: 1 INJECTION, SOLUTION INTRAMUSCULAR; INTRAVENOUS at 16:28

## 2019-12-23 RX ADMIN — KETOROLAC TROMETHAMINE 30 MG: 30 INJECTION, SOLUTION INTRAMUSCULAR at 23:33

## 2019-12-23 RX ADMIN — SCOPALAMINE 1 PATCH: 1 PATCH, EXTENDED RELEASE TRANSDERMAL at 15:26

## 2019-12-23 RX ADMIN — CEFOTETAN DISODIUM 2 G: 2 INJECTION, POWDER, FOR SOLUTION INTRAMUSCULAR; INTRAVENOUS at 16:39

## 2019-12-23 RX ADMIN — FENTANYL CITRATE 50 MCG: 50 INJECTION, SOLUTION INTRAMUSCULAR; INTRAVENOUS at 18:26

## 2019-12-23 RX ADMIN — ONDANSETRON 4 MG: 2 INJECTION INTRAMUSCULAR; INTRAVENOUS at 18:10

## 2019-12-23 RX ADMIN — ACETAMINOPHEN 1000 MG: 10 INJECTION, SOLUTION INTRAVENOUS at 21:54

## 2019-12-23 RX ADMIN — POTASSIUM CHLORIDE: 149 INJECTION, SOLUTION, CONCENTRATE INTRAVENOUS at 21:54

## 2019-12-23 RX ADMIN — MAGNESIUM SULFATE IN WATER 4 G: 40 INJECTION, SOLUTION INTRAVENOUS at 17:30

## 2019-12-23 ASSESSMENT — PAIN SCALES - GENERAL: PAIN_LEVEL: 0

## 2019-12-23 NOTE — OR NURSING
POC reviewed with pt. Call light within reach, educated to call for assistance, hourly rounding in place, bed in lowest position and locked. Multi Modals given.

## 2019-12-23 NOTE — ANESTHESIA PREPROCEDURE EVALUATION
Relevant Problems   ANESTHESIA   (+) Sleep apnea      PULMONARY   (+) Intermittent asthma without complication   morbid obesity    Physical Exam    Airway   Mallampati: III  TM distance: <3 FB  Neck ROM: full       Cardiovascular   Rhythm: regular  Rate: normal     Dental - normal exam         Pulmonary   Breath sounds clear to auscultation     Abdominal    Neurological - normal exam                 Anesthesia Plan    ASA 3   ASA physical status 3 criteria: morbid obesity - BMI greater than or equal to 40    Plan - general       Airway plan will be ETT        Induction: intravenous      Pertinent diagnostic labs and testing reviewed    Informed Consent:    Anesthetic plan and risks discussed with patient.

## 2019-12-24 VITALS
SYSTOLIC BLOOD PRESSURE: 112 MMHG | RESPIRATION RATE: 18 BRPM | OXYGEN SATURATION: 95 % | TEMPERATURE: 97.8 F | DIASTOLIC BLOOD PRESSURE: 57 MMHG | HEIGHT: 70 IN | WEIGHT: 293 LBS | BODY MASS INDEX: 41.95 KG/M2 | HEART RATE: 50 BPM

## 2019-12-24 LAB
ALBUMIN SERPL BCP-MCNC: 3.9 G/DL (ref 3.2–4.9)
ALBUMIN/GLOB SERPL: 1.9 G/DL
ALP SERPL-CCNC: 72 U/L (ref 30–99)
ALT SERPL-CCNC: 46 U/L (ref 2–50)
ANION GAP SERPL CALC-SCNC: 6 MMOL/L (ref 0–11.9)
AST SERPL-CCNC: 43 U/L (ref 12–45)
BILIRUB SERPL-MCNC: 0.5 MG/DL (ref 0.1–1.5)
BUN SERPL-MCNC: 10 MG/DL (ref 8–22)
CALCIUM SERPL-MCNC: 8.2 MG/DL (ref 8.5–10.5)
CHLORIDE SERPL-SCNC: 107 MMOL/L (ref 96–112)
CO2 SERPL-SCNC: 25 MMOL/L (ref 20–33)
CREAT SERPL-MCNC: 0.86 MG/DL (ref 0.5–1.4)
ERYTHROCYTE [DISTWIDTH] IN BLOOD BY AUTOMATED COUNT: 49.1 FL (ref 35.9–50)
GLOBULIN SER CALC-MCNC: 2.1 G/DL (ref 1.9–3.5)
GLUCOSE SERPL-MCNC: 104 MG/DL (ref 65–99)
HCT VFR BLD AUTO: 37.8 % (ref 37–47)
HGB BLD-MCNC: 11.9 G/DL (ref 12–16)
MCH RBC QN AUTO: 28.5 PG (ref 27–33)
MCHC RBC AUTO-ENTMCNC: 31.5 G/DL (ref 33.6–35)
MCV RBC AUTO: 90.4 FL (ref 81.4–97.8)
PATHOLOGY CONSULT NOTE: NORMAL
PLATELET # BLD AUTO: 313 K/UL (ref 164–446)
PMV BLD AUTO: 9.8 FL (ref 9–12.9)
POTASSIUM SERPL-SCNC: 4.1 MMOL/L (ref 3.6–5.5)
PROT SERPL-MCNC: 6 G/DL (ref 6–8.2)
RBC # BLD AUTO: 4.18 M/UL (ref 4.2–5.4)
SODIUM SERPL-SCNC: 138 MMOL/L (ref 135–145)
WBC # BLD AUTO: 7.2 K/UL (ref 4.8–10.8)

## 2019-12-24 PROCEDURE — 302129 PCA PLUS W/IV POLE: Performed by: SURGERY

## 2019-12-24 PROCEDURE — 700105 HCHG RX REV CODE 258: Performed by: SURGERY

## 2019-12-24 PROCEDURE — 36415 COLL VENOUS BLD VENIPUNCTURE: CPT

## 2019-12-24 PROCEDURE — 80053 COMPREHEN METABOLIC PANEL: CPT

## 2019-12-24 PROCEDURE — 700102 HCHG RX REV CODE 250 W/ 637 OVERRIDE(OP): Performed by: SURGERY

## 2019-12-24 PROCEDURE — 700111 HCHG RX REV CODE 636 W/ 250 OVERRIDE (IP): Performed by: SURGERY

## 2019-12-24 PROCEDURE — 85027 COMPLETE CBC AUTOMATED: CPT

## 2019-12-24 PROCEDURE — A9270 NON-COVERED ITEM OR SERVICE: HCPCS | Performed by: SURGERY

## 2019-12-24 RX ORDER — OXYCODONE HCL 5 MG/5 ML
5 SOLUTION, ORAL ORAL EVERY 4 HOURS PRN
Qty: 120 ML | Refills: 0 | Status: SHIPPED | OUTPATIENT
Start: 2019-12-24 | End: 2019-12-28

## 2019-12-24 RX ADMIN — OXYCODONE HYDROCHLORIDE 5 MG: 5 SOLUTION ORAL at 08:10

## 2019-12-24 RX ADMIN — ENOXAPARIN SODIUM 40 MG: 100 INJECTION SUBCUTANEOUS at 08:10

## 2019-12-24 RX ADMIN — OXYCODONE HYDROCHLORIDE 5 MG: 5 SOLUTION ORAL at 11:54

## 2019-12-24 RX ADMIN — KETOROLAC TROMETHAMINE 30 MG: 30 INJECTION, SOLUTION INTRAMUSCULAR at 05:05

## 2019-12-24 RX ADMIN — KETOROLAC TROMETHAMINE 30 MG: 30 INJECTION, SOLUTION INTRAMUSCULAR at 11:54

## 2019-12-24 RX ADMIN — POTASSIUM CHLORIDE: 149 INJECTION, SOLUTION, CONCENTRATE INTRAVENOUS at 08:11

## 2019-12-24 ASSESSMENT — PATIENT HEALTH QUESTIONNAIRE - PHQ9
SUM OF ALL RESPONSES TO PHQ9 QUESTIONS 1 AND 2: 0
2. FEELING DOWN, DEPRESSED, IRRITABLE, OR HOPELESS: NOT AT ALL
1. LITTLE INTEREST OR PLEASURE IN DOING THINGS: NOT AT ALL

## 2019-12-24 ASSESSMENT — LIFESTYLE VARIABLES
TOTAL SCORE: 0
CONSUMPTION TOTAL: NEGATIVE
EVER HAD A DRINK FIRST THING IN THE MORNING TO STEADY YOUR NERVES TO GET RID OF A HANGOVER: NO
EVER_SMOKED: YES
HAVE PEOPLE ANNOYED YOU BY CRITICIZING YOUR DRINKING: NO
HOW MANY TIMES IN THE PAST YEAR HAVE YOU HAD 5 OR MORE DRINKS IN A DAY: 0
DOES PATIENT WANT TO STOP DRINKING: NO
HAVE YOU EVER FELT YOU SHOULD CUT DOWN ON YOUR DRINKING: NO
TOTAL SCORE: 0
ON A TYPICAL DAY WHEN YOU DRINK ALCOHOL HOW MANY DRINKS DO YOU HAVE: 3
ALCOHOL_USE: YES
EVER FELT BAD OR GUILTY ABOUT YOUR DRINKING: NO
TOTAL SCORE: 0
AVERAGE NUMBER OF DAYS PER WEEK YOU HAVE A DRINK CONTAINING ALCOHOL: 1

## 2019-12-24 ASSESSMENT — COPD QUESTIONNAIRES
COPD SCREENING SCORE: 4
DO YOU EVER COUGH UP ANY MUCUS OR PHLEGM?: NO/ONLY WITH OCCASIONAL COLDS OR INFECTIONS
DURING THE PAST 4 WEEKS HOW MUCH DID YOU FEEL SHORT OF BREATH: SOME OF THE TIME
HAVE YOU SMOKED AT LEAST 100 CIGARETTES IN YOUR ENTIRE LIFE: YES
IN THE PAST 12 MONTHS DO YOU DO LESS THAN YOU USED TO BECAUSE OF YOUR BREATHING PROBLEMS: DISAGREE/UNSURE

## 2019-12-24 ASSESSMENT — COGNITIVE AND FUNCTIONAL STATUS - GENERAL
SUGGESTED CMS G CODE MODIFIER MOBILITY: CH
DAILY ACTIVITIY SCORE: 24
SUGGESTED CMS G CODE MODIFIER DAILY ACTIVITY: CH
MOBILITY SCORE: 24

## 2019-12-24 NOTE — PROGRESS NOTES
2 RN skin check complete:  - 6 lap sites to abdomen, dermabonded and open to air  - rest of skin intact  - patient turning self from side to side

## 2019-12-24 NOTE — DIETARY
NUTRITION SERVICES: BMI - Pt with BMI >40 (=Body mass index is 47.61 kg/m².), morbid obesity. Weight loss counseling not appropriate in acute care setting. Pt s/p gastric bypass anticipate F/u planned with MD/RD post D/c.     RECOMMEND - Referral to outpatient nutrition services for weight management, or F/u with MD/RD after D/C.

## 2019-12-24 NOTE — PROGRESS NOTES
Double OH-MB placed on pt's bed. Any further questions or assistance please call [employer] at [phone].

## 2019-12-24 NOTE — ANESTHESIA POSTPROCEDURE EVALUATION
Patient: Latia Almanza    Procedure Summary     Date:  12/23/19 Room / Location:  [place] [address] / SURGERY [facility]    Anesthesia Start:  1628 Anesthesia Stop:  1758    Procedure:  GASTRECTOMY, SLEEVE, LAPAROSCOPIC (Abdomen) Diagnosis:  (MORBID OBESITY)    Surgeon:  Asad Toro M.D. Responsible Provider:  Cale Rehman M.D.    Anesthesia Type:  general ASA Status:  3          Final Anesthesia Type: general  Last vitals  BP   124/55   Temp   36.7 °C (98.1 °F)    Pulse   67   Resp   19    SpO2   95 %      Anesthesia Post Evaluation    Patient location during evaluation: PACU  Patient participation: complete - patient participated  Level of consciousness: awake and alert  Pain score: 0    Airway patency: patent  Anesthetic complications: no  Cardiovascular status: hemodynamically stable  Respiratory status: acceptable  Hydration status: euvolemic    PONV: none

## 2019-12-24 NOTE — CARE PLAN
Problem: Communication  Goal: The ability to communicate needs accurately and effectively will improve  Outcome: PROGRESSING AS EXPECTED     Problem: Safety  Goal: Will remain free from injury  Outcome: PROGRESSING AS EXPECTED  Goal: Will remain free from falls  Outcome: PROGRESSING AS EXPECTED     Problem: Knowledge Deficit  Goal: Knowledge of disease process/condition, treatment plan, diagnostic tests, and medications will improve  Outcome: PROGRESSING AS EXPECTED  Goal: Knowledge of the prescribed therapeutic regimen will improve  Outcome: PROGRESSING AS EXPECTED     Problem: Pain Management  Goal: Pain level will decrease to patient's comfort goal  Outcome: PROGRESSING AS EXPECTED

## 2019-12-24 NOTE — CARE PLAN
Problem: Safety  Goal: Will remain free from falls  Outcome: PROGRESSING AS EXPECTED  Intervention: Implement fall precautions  Flowsheets  Taken 12/24/2019 0023  Bed Alarm: Alarm Not On  Taken 12/24/2019 0000  Environmental Precautions: Personal Belongings, Wastebasket, Call Bell etc. in Easy Reach;Treaded Slipper Socks on Patient;Transferred to Stronger Side;Report Given to Other Health Care Providers Regarding Fall Risk  Note:   Patient is a low fall risk. Patient educated to call for assistance. Call light left within reach of patient.       Problem: Infection  Goal: Will remain free from infection  Outcome: PROGRESSING AS EXPECTED  Intervention: Implement standard precautions and perform hand washing before and after patient contact  Note:   Hand hygiene performed prior to and after entering pt room. Gloves worn during patient interactions.

## 2019-12-24 NOTE — PROGRESS NOTES
Patient is AO x 4, RASS -1, and had 7-8/10 initial post-op pain treated with IV analgesics.  Patient had persistent nausea treated with an additional dose of zofran, haldol, and benadryl (see MAR).  Patient tolerating small sips of PO fluids.  VSS on 10L (ERAS).  Notable JOSE RAMON with desaturation observed immediately following Fentanyl administration.  Home CPAP with patient on [facility].    Patient refused activity in PACU, but was able to ambulate from [facility] to bed on GSU with 2 x assist.  Patient resting comfortably at time of transport.  Report given to Abebe OWENS [facility].

## 2019-12-24 NOTE — PROGRESS NOTES
"S: Latia Almanza  is a 54 y.o.  female admitted for sleeve gastrectomy on 12/23. Doing well this AM, doug sips, pain controlled      O:  BP (!) 99/55   Pulse 60   Temp 36.6 °C (97.9 °F) (Temporal)   Resp 16   Ht 1.778 m (5' 10\")   Wt (!) 150.5 kg (331 lb 12.7 oz)   SpO2 95%   Intake/Output       12/22/19 0700 - 12/23/19 0659 (Not Admitted) 12/23/19 0700 - 12/24/19 0659 12/24/19 0700 - 12/25/19 0659      [id number] 2425-1574 Total 9170-9323 9898-0670 Total 6359-9731 5160-0697 Total       Intake    P.O.  --  -- --  --  120 120  --  -- --    P.O. -- -- -- -- 120 120 -- -- --    I.V.  --  -- --  1100  -- 1100  --  -- --    Volume (mL) (lactated ringers infusion) -- -- -- 1100 -- 1100 -- -- --    Total Intake -- -- -- 7591 357 5486 -- -- --       Output    Urine  --  -- --  --  600 600  --  -- --    Number of Times Voided -- -- -- -- 1 x 1 x -- -- --    Urine Void (mL) -- -- -- -- 600 600 -- -- --    Stool  --  -- --  --  -- --  --  -- --    Number of Times Stooled -- -- -- -- 0 x 0 x -- -- --    Blood  --  -- --  50  -- 50  --  -- --    Est. Blood Loss -- -- -- 50 -- 50 -- -- --    Total Output -- -- -- 50 600 650 -- -- --       Net I/O     -- -- -- 1050 -480 570 -- -- --        Recent Labs     12/24/19 0438   SODIUM 138   POTASSIUM 4.1   CHLORIDE 107   CO2 25   GLUCOSE 104*   BUN 10   CREATININE 0.86   CALCIUM 8.2*     Recent Labs     12/24/19  0438   WBC 7.2   RBC 4.18*   HEMOGLOBIN 11.9*   HEMATOCRIT 37.8   MCV 90.4   MCH 28.5   MCHC 31.5*   RDW 49.1   PLATELETCT 313   MPV 9.8       Alert and Oriented x3, No Acute Distress  Normal Respiratory Effort  Abdomen soft, appropriately tender  Incisions/Bandages clean/dry/intact  Extremities warm and well perfused    A/P:  Plan to D/C home this afternoon once doug adequate PO, prescriptions provided.     Asad Toro MD  [facility]  "

## 2019-12-24 NOTE — RESPIRATORY CARE
COPD EDUCATION by COPD CLINICAL EDUCATOR  12/24/2019 at 7:52 AM by Edie Saunders     Patient interviewed by COPD education team. Patient refused COPD/education program at this time.

## 2019-12-24 NOTE — DISCHARGE PLANNING
"Anticipated Discharge Disposition: home with brother     Action: Met with pt at bedside. Pt states she will be picked up by her brother who will be taking care of her at home. Pt brother has been flying since yesterday from [place] due to delays. Pt has been unable to get a hold of her brother, Shaggy.     Pt reports she was independent of ADL/IADLs PTA. Pt denies etoh/drug, DME, or financial barriers to DC. Pt states she previously filled out advanced directive planning and is unsure of what is states. Pt states \"something like don't keep me alive if I die naturally.\" Pt educated on full code status and agrees that is correct.     Barriers to Discharge: transportation     Plan: provide pt transportation if her brother is unable      Length of Stay: 1  "

## 2019-12-24 NOTE — ANESTHESIA PROCEDURE NOTES
Airway  Date/Time: 12/23/2019 4:36 PM  Performed by: Cale Rehman M.D.  Authorized by: Cale Rehman M.D.     Location:  OR  Urgency:  Elective  Indications for Airway Management:  Anesthesia  Spontaneous Ventilation: absent    Sedation Level:  Deep  Preoxygenated: Yes    Patient Position:  Sniffing  Final Airway Type:  Endotracheal airway  Final Endotracheal Airway:  ETT  Cuffed: Yes    Technique Used for Successful ETT Placement:  Direct laryngoscopy  Insertion Site:  Oral  Blade Type:  Alvarez  Laryngoscope Blade/Videolaryngoscope Blade Size:  3  ETT Size (mm):  7.0  Measured from:  Teeth  ETT to Teeth (cm):  22  Placement Verified by: auscultation and capnometry    Cormack-Lehane Classification:  Grade I - full view of glottis  Number of Attempts at Approach:  1

## 2019-12-24 NOTE — PROGRESS NOTES
Patient arrived to unit with PACU RN. On 10L oxymask per ERAS protocol. In no acute distress. Mild complaints of nausea and pain. Ambulated from rney to bed.

## 2019-12-24 NOTE — ANESTHESIA TIME REPORT
Anesthesia Start and Stop Event Times     Date Time Event    12/23/2019 1551 Ready for Procedure     1628 Anesthesia Start     1758 Anesthesia Stop        Responsible Staff  12/23/19    Name Role Begin End    Cale Rehman M.D. Anesth 1628 1758        Preop Diagnosis (Free Text):  Pre-op Diagnosis     MORBID OBESITY        Preop Diagnosis (Codes):    Post op Diagnosis  Morbid obesity (HCC)      Premium Reason  A. 3PM - 7AM    Comments:

## 2019-12-24 NOTE — OP REPORT
Operative Report    Date: 12/23/2019    Surgeon: Asad Toro M.D.     Assistant: John Ganser, MD    Pre-operative Diagnosis: Morbid Obesity    Post-operative Diagnosis: Same    Procedure: Laparoscopic Sleeve Gastrectomy     ASA Classification: III.    Indications: This is a 54 y.o. female who presented with symptoms of Morbid Obesity, here for bariatric surgery.    Findings: No hiatal hernia identified, well constructed sleeve, two midline hernias identified.    Wound Classification: Class II, II, Clean Contaminated.    Procedure in detail: The patient was seen and examined in the preoperative holding area.  The risks benefits and alternatives of the procedure were discussed with the patient who wished to proceed with the procedure as described.  The patient was transferred to the operating room placed in supine position and all pressure points were properly padded.  General endotracheal anesthesia was induced and preoperative antibiotics were given per SCIP protocol.  Patient's abdomen was prepped with ChloraPrep and draped in the normal sterile fashion.  A timeout was performed confirming correct patient, correct procedure, and that all necessary equipment was in the room.      We began the procedure by accessing the abdomen.  The 5 mm Optiview port was used to access the abdomen in the epigastric area off midline to the left. Once we entered the abdomen pneumoperitoneum was achieved and maintained at 15 mmHg carbon dioxide throughout the entirety of the case.  Under direct visualization a 5 mm and a 12 mm right upper quadrant working port were placed and a 5 mm and a 12 mm left upper quadrant working port were placed.  We then placed the Waldo self-retaining liver retraction device allowing us good view of the stomach and the hiatus.    We began the procedure by mobilizing the epigastric fat pad using the ultrasonic energy device.  This was carefully dissected free demonstrating the angle of Hiss as well as  the left baldev. No hiatal hernia was identified.    We then proceeded with our laparoscopic sleeve gastrectomy.  The stomach was grasped and elevated and the ultrasonic energy device was used to enter the greater omentum.  The short gastric vessels were carefully dissected free along the entirety of the greater curve from approximately 5 cm from the pylorus through to the previous hiatal dissection.  We then elevated the stomach and identified and lysed any adhesions underlying the stomach in the lesser sac.  Under direct visualization the 40 Hungarian Visigi tube was advanced into the distal stomach and suction was applied.  We carefully splayed the stomach out along the Visigi tube.  We then proceeded with the gastrectomy using several fires of the purple load Endo SANTOSH stapler.  The resected stomach was placed off of the side and the suction removed from the with the tube. The duodenum was grasped and a leak test was performed and confirmed to be negative.  We then remove the Visigi tube and clipped in the areas of persistent bleeding with the 5 mm auto clip applier and pexied the greater omentum to the staple line to allow for appropriate orientation of the sleeve using several 2-0 Ethibond sutures.  The resected stomach was then removed through the right upper quadrant 12 mm port and sent for pathology.    The Waldo liver retractor was removed under direct visualization.  The remainder of our ports were removed and the pneumoperitoneum was released.  Skin was closed using subcuticular 4-0 Monocryl.  Dermabond was placed over the wounds.    The patient was awakened from general anesthetic, and was taken to the recovery room in stable condition.    Sponge and needle counts were correct at the end of the case.     Specimen: Stomach sent to pathology    EBL: 25cc    Dispo: stable, extubated, to PACU    Asad Toro M.D.  [facility]  [phone]

## 2019-12-24 NOTE — PROGRESS NOTES
"Bedside report received.  Assessment complete.  A&O x 4. Patient calls appropriately.  Patient ambulates with standby assist. Bed alarm not in use.   Patient has 4/10 pain. Pain managed with prescribed medications.  Denies N&V. Tolerating CLD diet.  Surgical sites CDI.  + void, + flatus, - BM.  Patient denies SOB.  SCD's in use.  Patient is pleasant and calm, with positive feelings regarding discharge home.  Review plan with of care with patient. Call light and personal belongings with in reach. Hourly rounding in place. All needs met at this time.  BP (!) 99/55   Pulse 60   Temp 36.6 °C (97.9 °F) (Temporal)   Resp 16   Ht 1.778 m (5' 10\")   Wt (!) 150.5 kg (331 lb 12.7 oz)   SpO2 96%   BMI 47.61 kg/m²     "

## 2019-12-24 NOTE — CARE PLAN
Problem: Communication  Goal: The ability to communicate needs accurately and effectively will improve  12/24/2019 1444 by Jese Amin R.N.  Outcome: MET  12/24/2019 0829 by Jese Amin R.N.  Outcome: PROGRESSING AS EXPECTED     Problem: Safety  Goal: Will remain free from injury  12/24/2019 1444 by Jese Amin R.N.  Outcome: MET  12/24/2019 0829 by Jese Amin R.N.  Outcome: PROGRESSING AS EXPECTED  Goal: Will remain free from falls  12/24/2019 1444 by Jese Amin R.N.  Outcome: MET  12/24/2019 0829 by Jees Amin R.N.  Outcome: PROGRESSING AS EXPECTED     Problem: Infection  Goal: Will remain free from infection  Outcome: MET     Problem: Venous Thromboembolism (VTW)/Deep Vein Thrombosis (DVT) Prevention:  Goal: Patient will participate in Venous Thrombosis (VTE)/Deep Vein Thrombosis (DVT)Prevention Measures  Outcome: MET     Problem: Bowel/Gastric:  Goal: Normal bowel function is maintained or improved  Outcome: MET  Goal: Will not experience complications related to bowel motility  Outcome: MET     Problem: Knowledge Deficit  Goal: Knowledge of disease process/condition, treatment plan, diagnostic tests, and medications will improve  12/24/2019 1444 by Jese Amin R.N.  Outcome: MET  12/24/2019 0829 by Jese Amin R.N.  Outcome: PROGRESSING AS EXPECTED  Goal: Knowledge of the prescribed therapeutic regimen will improve  12/24/2019 1444 by Jese Amin R.N.  Outcome: MET  12/24/2019 0829 by Jese Amin R.N.  Outcome: PROGRESSING AS EXPECTED     Problem: Discharge Barriers/Planning  Goal: Patient's continuum of care needs will be met  Outcome: MET     Problem: Pain Management  Goal: Pain level will decrease to patient's comfort goal  12/24/2019 1444 by Jese Amin R.N.  Outcome: MET  12/24/2019 0829 by Jese Amin R.N.  Outcome: PROGRESSING AS EXPECTED     Problem: Respiratory:  Goal: Respiratory status will improve  Outcome: MET     Problem: Psychosocial Needs:  Goal: Level of anxiety will  decrease  Outcome: MET     Problem: Fluid Volume:  Goal: Will maintain balanced intake and output  Outcome: MET

## 2020-01-22 ENCOUNTER — HOSPITAL ENCOUNTER (OUTPATIENT)
Dept: RADIOLOGY | Facility: MEDICAL CENTER | Age: 55
End: 2020-01-22
Attending: OTOLARYNGOLOGY
Payer: COMMERCIAL

## 2020-01-22 ENCOUNTER — HOSPITAL ENCOUNTER (EMERGENCY)
Facility: MEDICAL CENTER | Age: 55
End: 2020-01-22
Attending: EMERGENCY MEDICINE
Payer: COMMERCIAL

## 2020-01-22 VITALS
TEMPERATURE: 98.7 F | RESPIRATION RATE: 18 BRPM | DIASTOLIC BLOOD PRESSURE: 52 MMHG | BODY MASS INDEX: 41.03 KG/M2 | SYSTOLIC BLOOD PRESSURE: 118 MMHG | OXYGEN SATURATION: 95 % | WEIGHT: 286.6 LBS | HEIGHT: 70 IN | HEART RATE: 55 BPM

## 2020-01-22 DIAGNOSIS — T78.40XA ALLERGIC REACTION, INITIAL ENCOUNTER: ICD-10-CM

## 2020-01-22 PROCEDURE — A9576 INJ PROHANCE MULTIPACK: HCPCS | Performed by: OTOLARYNGOLOGY

## 2020-01-22 PROCEDURE — 99284 EMERGENCY DEPT VISIT MOD MDM: CPT

## 2020-01-22 PROCEDURE — 700105 HCHG RX REV CODE 258: Performed by: EMERGENCY MEDICINE

## 2020-01-22 PROCEDURE — 96375 TX/PRO/DX INJ NEW DRUG ADDON: CPT

## 2020-01-22 PROCEDURE — 700111 HCHG RX REV CODE 636 W/ 250 OVERRIDE (IP): Performed by: EMERGENCY MEDICINE

## 2020-01-22 PROCEDURE — 70553 MRI BRAIN STEM W/O & W/DYE: CPT

## 2020-01-22 PROCEDURE — 96374 THER/PROPH/DIAG INJ IV PUSH: CPT

## 2020-01-22 PROCEDURE — 700117 HCHG RX CONTRAST REV CODE 255: Performed by: OTOLARYNGOLOGY

## 2020-01-22 RX ORDER — CLINDAMYCIN HYDROCHLORIDE 300 MG/1
900 CAPSULE ORAL ONCE
COMMUNITY

## 2020-01-22 RX ORDER — OMEPRAZOLE 20 MG/1
20 CAPSULE, DELAYED RELEASE ORAL DAILY
COMMUNITY

## 2020-01-22 RX ORDER — DIPHENHYDRAMINE HYDROCHLORIDE 50 MG/ML
25 INJECTION INTRAMUSCULAR; INTRAVENOUS ONCE
Status: COMPLETED | OUTPATIENT
Start: 2020-01-22 | End: 2020-01-22

## 2020-01-22 RX ORDER — SODIUM CHLORIDE 9 MG/ML
INJECTION, SOLUTION INTRAVENOUS CONTINUOUS
Status: DISCONTINUED | OUTPATIENT
Start: 2020-01-22 | End: 2020-01-22 | Stop reason: HOSPADM

## 2020-01-22 RX ORDER — METHYLPREDNISOLONE SODIUM SUCCINATE 125 MG/2ML
125 INJECTION, POWDER, LYOPHILIZED, FOR SOLUTION INTRAMUSCULAR; INTRAVENOUS ONCE
Status: COMPLETED | OUTPATIENT
Start: 2020-01-22 | End: 2020-01-22

## 2020-01-22 RX ORDER — PREDNISONE 20 MG/1
40 TABLET ORAL DAILY
Qty: 10 TAB | Refills: 0 | Status: SHIPPED | OUTPATIENT
Start: 2020-01-23 | End: 2020-01-28

## 2020-01-22 RX ADMIN — GADOTERIDOL 30 ML: 279.3 INJECTION, SOLUTION INTRAVENOUS at 13:40

## 2020-01-22 RX ADMIN — METHYLPREDNISOLONE SODIUM SUCCINATE 125 MG: 125 INJECTION, POWDER, FOR SOLUTION INTRAMUSCULAR; INTRAVENOUS at 14:15

## 2020-01-22 RX ADMIN — SODIUM CHLORIDE 1000 ML: 9 INJECTION, SOLUTION INTRAVENOUS at 14:51

## 2020-01-22 RX ADMIN — DIPHENHYDRAMINE HYDROCHLORIDE 25 MG: 50 INJECTION INTRAMUSCULAR; INTRAVENOUS at 14:15

## 2020-01-22 NOTE — ED NOTES
Pt reports slight improvement after medication administration. Still having itching, but says her throat feels better. Face still slightly flushed.

## 2020-01-22 NOTE — ED NOTES
Patient alert and oriented, NAD, VSS, IV discontinued in tact. Patient verbalized understanding of dc instructions and prescirptions

## 2020-01-22 NOTE — DISCHARGE INSTRUCTIONS
Start prednisone tomorrow, take as instructed for 3-5 days.  Take benadryl 25 three times a day for next 1-2 days.

## 2020-01-22 NOTE — ED PROVIDER NOTES
ED Provider Note    CHIEF COMPLAINT  Chief Complaint   Patient presents with   • Rash       HPI  Latia Almanza is a 54 y.o. female who presents after developing allergic reaction after having an MRI today.  The patient states she she was complaining MRI when she started feeling some tightness in her throat.  She did not develop a rash itchy rash to her face and neck.  He was thought to be having an acute allergic reaction and was sent to the emerge department further evaluation.  The patient denies any current difficulty breathing or swallowing.  She does complain of tightness in her throat.  She does have a history of asthma,, but denies any acute wheezing.  Patient denies any previous allergies to medications or foods.  She has had MRIs and CAT scans in the past without problems.  She denies any recent fever, chills, sore throat, cough, congestion.  She has had no nausea, vomiting, or diarrhea.  She does note she just recently had a gastric sleeve about a month ago.    REVIEW OF SYSTEMS  See HPI for further details. All other systems are negative.     PAST MEDICAL HISTORY  Past Medical History:   Diagnosis Date   • Allergic rhinitis    • Anxiety    • Asthma     daily and as needed inhalers   • Back pain     back/ right hip   • Bronchitis 2017   • Dental disorder     upper implants   • Fatty liver     per pt hx   • Heart murmur     functional murmur   • Obesity    • Pneumonia 2017   • Restless leg syndrome    • Sarcoidosis    • Sciatica    • Sleep apnea     CPAP   • Snoring        FAMILY HISTORY  Family History   Problem Relation Age of Onset   • Lung Disease Mother         COPD, asthma   • Diabetes Mother    • Diabetes Father    • Heart Disease Father        SOCIAL HISTORY  Social History     Socioeconomic History   • Marital status: Single     Spouse name: Not on file   • Number of children: Not on file   • Years of education: Not on file   • Highest education level: Not on file   Occupational History   •  Not on file   Social Needs   • Financial resource strain: Not on file   • Food insecurity:     Worry: Not on file     Inability: Not on file   • Transportation needs:     Medical: Not on file     Non-medical: Not on file   Tobacco Use   • Smoking status: Former Smoker     Packs/day: 0.25     Years: 5.00     Pack years: 1.25     Types: Cigarettes     Last attempt to quit: 2001     Years since quittin.0   • Smokeless tobacco: Never Used   Substance and Sexual Activity   • Alcohol use: Yes     Frequency: 2-3 times a week     Drinks per session: 1 or 2   • Drug use: No   • Sexual activity: Not on file   Lifestyle   • Physical activity:     Days per week: Not on file     Minutes per session: Not on file   • Stress: Not on file   Relationships   • Social connections:     Talks on phone: Not on file     Gets together: Not on file     Attends [employer] service: Not on file     Active member of club or organization: Not on file     Attends meetings of clubs or organizations: Not on file     Relationship status: Not on file   • Intimate partner violence:     Fear of current or ex partner: Not on file     Emotionally abused: Not on file     Physically abused: Not on file     Forced sexual activity: Not on file   Other Topics Concern   • Not on file   Social History Narrative   • Not on file       SURGICAL HISTORY  Past Surgical History:   Procedure Laterality Date   • PB LAP, MANNY RESTRICT PROC, LONGITUDINAL GAS*  2019    Procedure: GASTRECTOMY, SLEEVE, LAPAROSCOPIC;  Surgeon: Asad Toro M.D.;  Location: [facility];  Service: General   • TRIGGER FINGER RELEASE Right 10/17/2018    Procedure: TRIGGER FINGER RELEASE - A1 KATYA THUMB;  Surgeon: Houston Yung M.D.;  Location: [facility];  Service: Orthopedics   • CYST EXCISION  10/17/2018    Procedure: CYST EXCISION;  Surgeon: Houston Yung M.D.;  Location: [facility];  Service: Orthopedics   • GASTROSCOPY  "N/A 5/9/2018    Procedure: GASTROSCOPY;  Surgeon: Martin Castillo M.D.;  Location: SURGERY [facility] DAY [facility];  Service: Gastroenterology   • OTHER  2017    dental implants   • LUMBAR FUSION ANTERIOR  10/27/2016    Procedure: LUMBAR FUSION ANTERIOR L5-S1 ALIF;  Surgeon: Ronal Washington III, M.D.;  Location: SURGERY [facility];  Service:    • HIP ARTHROPLASTY TOTAL Right 2008   • OTHER ORTHOPEDIC SURGERY Right 2008    R hip replacement March 2008   • ABDOMINAL HYSTERECTOMY TOTAL  2007   • SINUSCOPE  2001   • LAPAROTOMY  2000       CURRENT MEDICATIONS  Home Medications    **Home medications have not yet been reviewed for this encounter**         ALLERGIES  Allergies   Allergen Reactions   • Prohance [Gadoteridol] Anaphylaxis, Hives and Rash     SCRATCHY, FULL FEELING THROAT   • Codeine Nausea       PHYSICAL EXAM  VITAL SIGNS: Blood Pressure 148/72   Pulse 62   Temperature 37.1 °C (98.7 °F) (Temporal)   Respiration 18   Height 1.778 m (5' 10\")   Weight (Abnormal) 130 kg (286 lb 9.6 oz)   Oxygen Saturation 98%   Body Mass Index 41.12 kg/m²   Constitutional: Awake, alert, in no acute distress, Non-toxic appearance.   HENT: Atraumatic. Bilateral external ears normal, mucous membranes moist, throat nonerythematous without exudates, no swelling to the soft palate or uvula, nose is normal.  Eyes: PERRL, EOMI, conjunctiva moist, noninjected.  Neck: Nontender, Normal range of motion, No nuchal rigidity, No stridor.   Lymphatic: No lymphadenopathy noted.   Cardiovascular: Regular rate and rhythm, no murmurs, rubs, gallops.  Thorax & Lungs:  Good breath sounds bilaterally, no wheezes, rales, or retractions.  No chest tenderness.  Abdomen: Bowel sounds normal, Soft, nontender, nondistended, no rebound, guarding, masses.  Back: No CVA or spinal tenderness.  Extremities: Intact distal pulses, No edema, No tenderness.   Skin: Warm, Dry, there is erythematous rash to the face, neck, upper chest and back with " urticaria present.  Musculoskeletal: No joint swelling or tenderness.  Neurologic: Alert & oriented x 3, sensory and motor function normal. No focal deficits.   Psychiatric: Affect normal, Judgment normal, Mood normal.       COURSE & MEDICAL DECISION MAKING  Pertinent Labs & Imaging studies reviewed. (See chart for details)  The patient presents with what appears to be acute allergic reaction.  IV was placed, she was given a dose Solu-Medrol 125 mg IV, and Benadryl 25 mg IV.  She was given normal saline for the acute allergic reaction.  After about an hour, patient was feeling much improved.  Her hives appear to be resolving.  Patient will be discharged on a 5-day course of prednisone, over-the-counter Benadryl.  I have asked to follow-up with her PCP for possible allergy testing.  She is to return to the ER for any worsening symptoms, difficulty breathing, worsening rash, or any other problems.    FINAL IMPRESSION  1.  Acute allergic reaction  2.   3.         Electronically signed by: Cornelio Corey M.D., 1/22/2020 2:10 PM

## 2020-02-17 NOTE — TELEPHONE ENCOUNTER
Pt would like to speak to someone regarding possible side effects she has been experiencing for the past 2 days. She states that she has been having knee and hip pain and having a difficult time walking. She has been on Prednisone since Thanksgiving and wants to know if this would be the cause.     Also, she would like to check on her disability ppwk that should have been sent in Wednesday, 11/29/17   Price (Do Not Change): 0.00 Detail Level: Simple Instructions: This plan will send the code FBSD to the PM system.  DO NOT or CHANGE the price.

## 2021-03-15 DIAGNOSIS — Z23 NEED FOR VACCINATION: ICD-10-CM

## 2021-04-21 ENCOUNTER — TELEPHONE (OUTPATIENT)
Dept: SLEEP MEDICINE | Facility: MEDICAL CENTER | Age: 56
End: 2021-04-21

## 2021-04-21 NOTE — TELEPHONE ENCOUNTER
Patient left vm requesting records to provider in [place].     I left the patient a vm providing HIM number to be further assisted.

## 2021-10-06 NOTE — ED NOTES
Pt reports 'burning' at IV site. 2cm reddened area noted. IV removed at this time. Pt reports relief. Denies needs or questions at this time. Breathing continues to be unlabored and even. Call light in reach.    0 0
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
## 2023-02-16 NOTE — ED TRIAGE NOTES
Pt arrives from MRI for allergic reaction s/p dye injection for scan. Patient has redness to face and chest. States tightness to throat, maintaining airway, speaking in full sentences.    Depressed

## 2023-05-05 NOTE — TELEPHONE ENCOUNTER
Bobby Mai w/ [facility] pulmonary called requesting an order for a CT- High Resolution w/o contrast.    Last OV: 12/13/17-Dr. Mujica    Next OV: 1/24/18    Mild intermittent asthma w/comp  
clear

## 2023-07-23 NOTE — DISCHARGE INSTRUCTIONS
Discharge Instructions    Discharged to home by taxi with self. Discharged via wheelchair, hospital escort: Yes.  Special equipment needed: Not Applicable    Be sure to schedule a follow-up appointment with your primary care doctor or any specialists as instructed.     Discharge Plan:   Influenza Vaccine Indication: Patient Refuses    I understand that a diet low in cholesterol, fat, and sodium is recommended for good health. Unless I have been given specific instructions below for another diet, I accept this instruction as my diet prescription.   Other diet: CLD    Special Instructions: None    · Is patient discharged on Warfarin / Coumadin?   No Sleeve Gastrectomy, Care After  Refer to this sheet in the next few weeks. These instructions provide you with information on caring for yourself after your procedure. Your surgeon may also give you more specific instructions. Your treatment has been planned according to current medical practices, but problems sometimes occur. Call your surgeon if you have any problems or questions after your procedure.  HOME CARE INSTRUCTIONS  Get plenty of rest, but move around frequently for short periods or take short walks as directed by your surgeon. Increase your activities gradually.  Only take over-the-counter or prescription medicines as directed by your surgeon.  Keep incision areas clean and dry. Remove or change any bandages (dressings) only as directed by your surgeon. You may have skin adhesive strips or glue over the incision areas. Do not take the strips or the glue off. They will fall off on their own.  Check your incisions and surrounding areas daily for any redness, swelling, or drainage of fluid.  Take showers once your surgeon approves. Until then, only take sponge baths. Pat incisions dry. Do not rub incisions with a washcloth or towel. Do not take tub baths or go swimming until your surgeon approves. Do not put anything on your incision to clean it unless directed  to do so by your surgeon.  Limit activities as directed by your surgeon. You will need to avoid strenuous activity, heavy lifting, and pushing or pulling things with your arms for several weeks. Do not lift anything heavier than 10 lb (4.5 kg).  Perform deep breathing exercises and coughing as directed by your surgeon. This helps prevent a lung infection.  Do not drive until your surgeon approves.  Follow all of the dietary instructions provided by your surgeon or dietitian. You will receive specific instructions on the type, size, and timing of meals.      You may need to stay on a liquid diet for some time after the surgery.  Drink fluids frequently. You should drink 1 oz of fluid as often as you can.  Take vitamin, calcium, and protein supplements as directed by your surgeon.  If you have a drain from the incision area, make sure you:      Keep the area of the drain clean and dry.  Empty the drain and record the amount of fluid daily.  Talk with your surgeon about when you may return to work and about your exercise routine.    Keep all follow-up appointments with your surgeon and dietitian.  SEEK MEDICAL CARE IF:  Your pain is not controlled with medicine.  You have a fever.  You have shaking chills.  You notice any redness, skin irritation, swelling, or drainage of fluid (other than light red) in the incision area.  Your drain gets pulled out accidentally.    Your drain contains bright red blood, green fluid, or fluid that has a foul smell.  SEEK IMMEDIATE MEDICAL CARE IF:  You have difficulty breathing.  You have severe calf pain.  MAKE SURE YOU:  Understand these instructions.  Will watch your condition.  Will get help right away if you are not doing well or get worse.     This information is not intended to replace advice given to you by your health care provider. Make sure you discuss any questions you have with your health care provider.     Document Released: 10/14/2010 Document Revised: 08/20/2014  Document Reviewed: 05/02/2014  [employer] Interactive Patient Education ©2016 [employer]      Depression / Suicide Risk    As you are discharged from this [facility] facility, it is important to learn how to keep safe from harming yourself.    Recognize the warning signs:  · Abrupt changes in personality, positive or negative- including increase in energy   · Giving away possessions  · Change in eating patterns- significant weight changes-  positive or negative  · Change in sleeping patterns- unable to sleep or sleeping all the time   · Unwillingness or inability to communicate  · Depression  · Unusual sadness, discouragement and loneliness  · Talk of wanting to die  · Neglect of personal appearance   · Rebelliousness- reckless behavior  · Withdrawal from people/activities they love  · Confusion- inability to concentrate     If you or a loved one observes any of these behaviors or has concerns about self-harm, here's what you can do:  · Talk about it- your feelings and reasons for harming yourself  · Remove any means that you might use to hurt yourself (examples: pills, rope, extension cords, firearm)  · Get professional help from the community (Mental Health, Substance Abuse, psychological counseling)  · Do not be alone:Call your Safe Contact- someone whom you trust who will be there for you.  · Call your local CRISIS HOTLINE 391-6073 or 074-532-7129  · Call your local Children's Mobile Crisis Response Team [place] (277) 210-5799 or www.Hark  · Call the toll free National Suicide Prevention Hotlines   · National Suicide Prevention Lifeline 840-202-XSSH (9686)  · National Hope Line Network 800-SUICIDE (669-5767)    D/C instructions:    DIET: Upon discharge from the hospital follow the post-operative diet as discussed in the pamphlet provided, you can advance to full liquids once tolerating clears for 2-3 days. Call with questions.    ACTIVITIES: After discharge from the hospital, you may resume  full routine activities. However, there should be no heavy lifting (greater than 15 pounds) and no strenuous activities until after your follow-up visit. Otherwise, routine activities of daily living are acceptable.    DRIVING: If you drive, you may drive whenever you are off pain medications and are able to perform the activities needed to drive, i.e. turning, bending, twisting, etc.    BATHING: You may get the wound wet at any time after leaving the hospital. You may shower, but do not submerge in a bath for at least a week. Dressings may come off after 48 hours.    PAIN MEDICATION: You will be given a prescription for pain medication at discharge. Please take these as directed. It is important to remember not to take medications on an empty stomach as this may cause nausea.    CALL IF YOU HAVE: (1) Fevers to more than 1010 F, (2) Unusual chest or leg pain, (3) Drainage or fluid from incision that may be foul smelling, increased tenderness or soreness at the wound or the wound edges are no longer together, redness or swelling at the incision site. Please do not hesitate to call with any other questions.     APPOINTMENT: Contact our office at [phone] for a follow-up appointment in 1 week following your procedure.    If you have any additional questions, please do not hesitate to call the office.    Office address:   [address], [address] [place] [address]           Private car

## (undated) DEVICE — WRAP CO-FLEX 4IN X 5YD STERIL - SELF-ADHERENT (18/CA)

## (undated) DEVICE — GLOVE SZ 7.5 BIOGEL PI MICRO - PF LF (50PR/BX)

## (undated) DEVICE — Device

## (undated) DEVICE — CONTAINER, SPECIMEN, STERILE

## (undated) DEVICE — GLOVE BIOGEL PI INDICATOR SZ 8.0 SURGICAL PF LF -(50/BX 4BX/CA)

## (undated) DEVICE — NEEDLE NON SAFETY HYPO 22 GA X 1 1/2 IN (100/BX)

## (undated) DEVICE — SUCTION INSTRUMENT YANKAUER BULBOUS TIP W/O VENT (50EA/CA)

## (undated) DEVICE — NEEDLE INSFL 120MM 14GA VRRS - (20/BX)

## (undated) DEVICE — LOCAL

## (undated) DEVICE — PACK GASTRIC BANDING OR - (1/CA)

## (undated) DEVICE — CANNULA W/SEAL 5X100 Z-THRE - ADED KII (12/BX)

## (undated) DEVICE — PROTECTOR ULNA NERVE - (36PR/CA)

## (undated) DEVICE — BAG, SPONGE COUNT 50600

## (undated) DEVICE — TUBING CLEARLINK DUO-VENT - C-FLO (48EA/CA)

## (undated) DEVICE — GLOVE BIOGEL ECLIPSE PF LATEX SIZE 7.5

## (undated) DEVICE — SET SUCTION/IRRIGATION WITH DISPOSABLE TIP (6/CA )PART #0250-070-520 IS A SUB

## (undated) DEVICE — TROCAR SEPARATOR 15MMZTHREAD - (6/BX)

## (undated) DEVICE — GLOVE BIOGEL M SZ 8 SURGICAL PF LTX - (50/BX 4BX/CA)

## (undated) DEVICE — SUTURE 4-0 MONOCRYL PLUS PS-2 - 27 INCH (36/BX)

## (undated) DEVICE — LACTATED RINGERS INJ 1000 ML - (14EA/CA 60CA/PF)

## (undated) DEVICE — CANNULA W/SEAL12X100ZTHREAD - (12/BX)

## (undated) DEVICE — CHLORAPREP 26 ML APPLICATOR - ORANGE TINT(25/CA)

## (undated) DEVICE — BOVIE NEEDLE TIP INSULATD NON-SAFETY 2CM (50/PK)

## (undated) DEVICE — MASK ANESTHESIA ADULT  - (100/CA)

## (undated) DEVICE — BLADE SURGICAL #15 - (50/BX 3BX/CA)

## (undated) DEVICE — KIT ROOM DECONTAMINATION

## (undated) DEVICE — GLOVE, LITE (PAIR)

## (undated) DEVICE — SYRINGE 10 ML CONTROL LL (25EA/BX 4BX/CA)

## (undated) DEVICE — SUTURE 2-0 ETHIBOND GREEN CT-2 TAPER (36PK/BX)

## (undated) DEVICE — SUTURE 4-0 ETHILON PS-2 18 (12PK/BX)"

## (undated) DEVICE — ELECTRODE DUAL RETURN W/ CORD - (50/PK)

## (undated) DEVICE — KIT ANESTHESIA W/CIRCUIT & 3/LT BAG W/FILTER (20EA/CA)

## (undated) DEVICE — HEAD HOLDER JUNIOR/ADULT

## (undated) DEVICE — GLOVE BIOGEL SZ 7 SURGICAL PF LTX - (50PR/BX 4BX/CA)

## (undated) DEVICE — SYSTEM CALIBARATION  GASTRECTOMY 40FR (5/BX)

## (undated) DEVICE — ELECTRODE 5MM LHK LAPSCP STERILE DISP- MEGADYNE  (5/CA)

## (undated) DEVICE — PACK LOWER EXTREMITY - (2/CA)

## (undated) DEVICE — SOD. CHL 10CC SYRINGE PREFILL - W/10 CC (30/BX)

## (undated) DEVICE — DRESSING TRANSPARENT FILM TEGADERM 2.375 X 2.75"  (100EA/BX)"

## (undated) DEVICE — ELECTRODE 850 FOAM ADHESIVE - HYDROGEL RADIOTRNSPRNT (50/PK)

## (undated) DEVICE — SODIUM CHL IRRIGATION 0.9% 1000ML (12EA/CA)

## (undated) DEVICE — SENSOR SPO2 NEO LNCS ADHESIVE (20/BX) SEE USER NOTES

## (undated) DEVICE — BLADE BEAVER 6400 MINI EYE ROUND TIP SHARP ON ONE SIDE (20/CA)

## (undated) DEVICE — HUMID-VENT HEAT AND MOISTURE EXCHANGE- (50/BX)

## (undated) DEVICE — GLOVE BIOGEL SZ 8 SURGICAL PF LTX - (50PR/BX 4BX/CA)

## (undated) DEVICE — FILM CASSETTE ENDO

## (undated) DEVICE — TROCAR Z THREAD12MM OPTICAL - NON BLADED (6/BX)

## (undated) DEVICE — TUBING LAPAROSCOPIC PLUME DEVICE (10EA/CA)

## (undated) DEVICE — SET LEADWIRE 5 LEAD BEDSIDE DISPOSABLE ECG (1SET OF 5/EA)

## (undated) DEVICE — PACK UPPER EXTREMITY SM OR - (3/CA)

## (undated) DEVICE — SET EXTENSION WITH 2 PORTS (48EA/CA) ***PART #2C8610 IS A SUBSTITUTE*****

## (undated) DEVICE — CANISTER SUCTION 3000ML MECHANICAL FILTER AUTO SHUTOFF MEDI-VAC NONSTERILE LF DISP  (40EA/CA)

## (undated) DEVICE — KIT CUSTOM PROCEDURE SINGLE FOR ENDO  (15/CA)

## (undated) DEVICE — GLOVE BIOGEL INDICATOR SZ 8 SURGICAL PF LTX - (50/BX 4BX/CA)

## (undated) DEVICE — DRAPE LARGE 3 QUARTER - (20/CA)

## (undated) DEVICE — TROCAR 5X100 NON BLADED Z-TH - READ KII (6/BX)

## (undated) DEVICE — NEPTUNE 4 PORT MANIFOLD - (20/PK)

## (undated) DEVICE — GLOVE BIOGEL PI INDICATOR SZ 7.0 SURGICAL PF LF - (50/BX 4BX/CA)

## (undated) DEVICE — PEN SKIN MARKER W/RULER - (50EA/BX)

## (undated) DEVICE — BITE BLOCK ADULT 60FR (100EA/CA)

## (undated) DEVICE — PADDING CAST 4 IN STERILE - 4 X 4 YDS (24/CA)

## (undated) DEVICE — SPONGE GAUZESTER. 2X2 4-PL - (2/PK 50PK/BX 30BX/CS)

## (undated) DEVICE — SUTURE GENERAL

## (undated) DEVICE — BITEBLOCK ENDOSCOPIC PEDI. - (25/BX)

## (undated) DEVICE — ADHESIVE MASTISOL - (48/BX)

## (undated) DEVICE — SYSTEM CLEARIFY VISUALIZATION (10EA/PK)

## (undated) DEVICE — GOWN SURGEONS X-LARGE - DISP. (30/CA)